# Patient Record
Sex: FEMALE | Race: WHITE | Employment: OTHER | URBAN - METROPOLITAN AREA
[De-identification: names, ages, dates, MRNs, and addresses within clinical notes are randomized per-mention and may not be internally consistent; named-entity substitution may affect disease eponyms.]

---

## 2017-05-06 ENCOUNTER — HOSPITAL ENCOUNTER (EMERGENCY)
Age: 22
Discharge: HOME | End: 2017-05-06
Attending: EMERGENCY MEDICINE
Payer: SELF-PAY

## 2017-05-06 VITALS
RESPIRATION RATE: 16 BRPM | SYSTOLIC BLOOD PRESSURE: 104 MMHG | TEMPERATURE: 98.5 F | HEIGHT: 60 IN | DIASTOLIC BLOOD PRESSURE: 61 MMHG | BODY MASS INDEX: 23.59 KG/M2 | HEART RATE: 99 BPM | OXYGEN SATURATION: 96 % | WEIGHT: 120.15 LBS

## 2017-05-06 DIAGNOSIS — O21.1 HYPEREMESIS GRAVIDARUM BEFORE END OF 22 WEEK GESTATION, DEHYDRATION: Primary | ICD-10-CM

## 2017-05-06 DIAGNOSIS — O09.291 HIGH RISK PREGNANCY DUE TO HISTORY OF PREVIOUS OBSTETRICAL PROBLEM, FIRST TRIMESTER: ICD-10-CM

## 2017-05-06 DIAGNOSIS — K50.919 CROHN'S DISEASE WITH COMPLICATION, UNSPECIFIED GASTROINTESTINAL TRACT LOCATION (HCC): ICD-10-CM

## 2017-05-06 DIAGNOSIS — I95.9 HYPOTENSION, UNSPECIFIED HYPOTENSION TYPE: ICD-10-CM

## 2017-05-06 LAB
ALBUMIN SERPL BCP-MCNC: 3.2 G/DL (ref 3.5–5)
ALBUMIN/GLOB SERPL: 0.7 {RATIO} (ref 1.1–2.2)
ALP SERPL-CCNC: 118 U/L (ref 45–117)
ALT SERPL-CCNC: 37 U/L (ref 12–78)
ANION GAP BLD CALC-SCNC: 11 MMOL/L (ref 5–15)
APPEARANCE UR: ABNORMAL
AST SERPL W P-5'-P-CCNC: 32 U/L (ref 15–37)
BACTERIA URNS QL MICRO: ABNORMAL /HPF
BASOPHILS # BLD AUTO: 0 K/UL
BASOPHILS # BLD: 0 %
BILIRUB SERPL-MCNC: 0.8 MG/DL (ref 0.2–1)
BILIRUB UR QL CFM: NEGATIVE
BUN SERPL-MCNC: 7 MG/DL (ref 6–20)
BUN/CREAT SERPL: 13 (ref 12–20)
CALCIUM SERPL-MCNC: 8.8 MG/DL (ref 8.5–10.1)
CHLORIDE SERPL-SCNC: 104 MMOL/L (ref 97–108)
CO2 SERPL-SCNC: 23 MMOL/L (ref 21–32)
COLOR UR: ABNORMAL
CREAT SERPL-MCNC: 0.53 MG/DL (ref 0.55–1.02)
DIFFERENTIAL METHOD BLD: ABNORMAL
EOSINOPHIL # BLD: 0.7 K/UL
EOSINOPHIL NFR BLD: 5 %
EPITH CASTS URNS QL MICRO: ABNORMAL /LPF
ERYTHROCYTE [DISTWIDTH] IN BLOOD BY AUTOMATED COUNT: 18.6 % (ref 11.5–14.5)
GLOBULIN SER CALC-MCNC: 4.3 G/DL (ref 2–4)
GLUCOSE SERPL-MCNC: 92 MG/DL (ref 65–100)
GLUCOSE UR STRIP.AUTO-MCNC: NEGATIVE MG/DL
HCT VFR BLD AUTO: 31.3 % (ref 35–47)
HGB BLD-MCNC: 9.9 G/DL (ref 11.5–16)
HGB UR QL STRIP: NEGATIVE
KETONES UR QL STRIP.AUTO: 80 MG/DL
LEUKOCYTE ESTERASE UR QL STRIP.AUTO: ABNORMAL
LYMPHOCYTES # BLD AUTO: 5 %
LYMPHOCYTES # BLD: 0.7 K/UL
MCH RBC QN AUTO: 24.2 PG (ref 26–34)
MCHC RBC AUTO-ENTMCNC: 31.6 G/DL (ref 30–36.5)
MCV RBC AUTO: 76.5 FL (ref 80–99)
MONOCYTES # BLD: 0.9 K/UL
MONOCYTES NFR BLD AUTO: 6 %
MUCOUS THREADS URNS QL MICRO: ABNORMAL /LPF
NEUTS BAND NFR BLD MANUAL: 3 %
NEUTS SEG # BLD: 12.6 K/UL
NEUTS SEG NFR BLD AUTO: 81 %
NITRITE UR QL STRIP.AUTO: NEGATIVE
PH UR STRIP: 5.5 [PH] (ref 5–8)
PLATELET # BLD AUTO: 304 K/UL (ref 150–400)
POTASSIUM SERPL-SCNC: 3.1 MMOL/L (ref 3.5–5.1)
PROT SERPL-MCNC: 7.5 G/DL (ref 6.4–8.2)
PROT UR STRIP-MCNC: 100 MG/DL
RBC # BLD AUTO: 4.09 M/UL (ref 3.8–5.2)
RBC #/AREA URNS HPF: ABNORMAL /HPF (ref 0–5)
RBC MORPH BLD: ABNORMAL
SODIUM SERPL-SCNC: 138 MMOL/L (ref 136–145)
SP GR UR REFRACTOMETRY: >1.03 (ref 1–1.03)
UA: UC IF INDICATED,UAUC: ABNORMAL
UROBILINOGEN UR QL STRIP.AUTO: 1 EU/DL (ref 0.2–1)
WBC # BLD AUTO: 14.9 K/UL (ref 3.6–11)
WBC URNS QL MICRO: ABNORMAL /HPF (ref 0–4)

## 2017-05-06 PROCEDURE — 87086 URINE CULTURE/COLONY COUNT: CPT | Performed by: PHYSICIAN ASSISTANT

## 2017-05-06 PROCEDURE — 81001 URINALYSIS AUTO W/SCOPE: CPT | Performed by: PHYSICIAN ASSISTANT

## 2017-05-06 PROCEDURE — 96374 THER/PROPH/DIAG INJ IV PUSH: CPT

## 2017-05-06 PROCEDURE — 74011250636 HC RX REV CODE- 250/636: Performed by: PHYSICIAN ASSISTANT

## 2017-05-06 PROCEDURE — 74011250637 HC RX REV CODE- 250/637: Performed by: PHYSICIAN ASSISTANT

## 2017-05-06 PROCEDURE — 85025 COMPLETE CBC W/AUTO DIFF WBC: CPT | Performed by: PHYSICIAN ASSISTANT

## 2017-05-06 PROCEDURE — 80053 COMPREHEN METABOLIC PANEL: CPT | Performed by: PHYSICIAN ASSISTANT

## 2017-05-06 PROCEDURE — 36415 COLL VENOUS BLD VENIPUNCTURE: CPT | Performed by: PHYSICIAN ASSISTANT

## 2017-05-06 PROCEDURE — 99283 EMERGENCY DEPT VISIT LOW MDM: CPT

## 2017-05-06 PROCEDURE — 96361 HYDRATE IV INFUSION ADD-ON: CPT

## 2017-05-06 PROCEDURE — 96376 TX/PRO/DX INJ SAME DRUG ADON: CPT

## 2017-05-06 RX ORDER — ONDANSETRON 2 MG/ML
4 INJECTION INTRAMUSCULAR; INTRAVENOUS
Status: COMPLETED | OUTPATIENT
Start: 2017-05-06 | End: 2017-05-06

## 2017-05-06 RX ORDER — POTASSIUM CHLORIDE 20 MEQ/1
40 TABLET, EXTENDED RELEASE ORAL
Status: COMPLETED | OUTPATIENT
Start: 2017-05-06 | End: 2017-05-06

## 2017-05-06 RX ORDER — PROMETHAZINE HYDROCHLORIDE 25 MG/1
25 TABLET ORAL
Qty: 12 TAB | Refills: 0 | Status: SHIPPED | OUTPATIENT
Start: 2017-05-06 | End: 2017-07-11

## 2017-05-06 RX ORDER — SODIUM CHLORIDE 0.9 % (FLUSH) 0.9 %
5-10 SYRINGE (ML) INJECTION AS NEEDED
Status: DISCONTINUED | OUTPATIENT
Start: 2017-05-06 | End: 2017-05-06 | Stop reason: HOSPADM

## 2017-05-06 RX ORDER — ONDANSETRON 4 MG/1
4 TABLET, ORALLY DISINTEGRATING ORAL
Qty: 10 TAB | Refills: 0 | Status: SHIPPED | OUTPATIENT
Start: 2017-05-06 | End: 2017-07-11

## 2017-05-06 RX ORDER — SODIUM CHLORIDE 0.9 % (FLUSH) 0.9 %
5-10 SYRINGE (ML) INJECTION EVERY 8 HOURS
Status: DISCONTINUED | OUTPATIENT
Start: 2017-05-06 | End: 2017-05-06 | Stop reason: HOSPADM

## 2017-05-06 RX ORDER — ACETAMINOPHEN 500 MG
1000 TABLET ORAL ONCE
Status: COMPLETED | OUTPATIENT
Start: 2017-05-06 | End: 2017-05-06

## 2017-05-06 RX ADMIN — SODIUM CHLORIDE 1000 ML: 900 INJECTION, SOLUTION INTRAVENOUS at 10:13

## 2017-05-06 RX ADMIN — POTASSIUM CHLORIDE 40 MEQ: 20 TABLET, EXTENDED RELEASE ORAL at 12:22

## 2017-05-06 RX ADMIN — Medication 10 ML: at 11:09

## 2017-05-06 RX ADMIN — ONDANSETRON HYDROCHLORIDE 4 MG: 2 INJECTION, SOLUTION INTRAMUSCULAR; INTRAVENOUS at 11:09

## 2017-05-06 RX ADMIN — ACETAMINOPHEN 1000 MG: 500 TABLET ORAL at 11:09

## 2017-05-06 RX ADMIN — ONDANSETRON HYDROCHLORIDE 4 MG: 2 INJECTION, SOLUTION INTRAMUSCULAR; INTRAVENOUS at 10:09

## 2017-05-06 NOTE — DISCHARGE INSTRUCTIONS
Extreme Nausea and Vomiting in Pregnancy: Care Instructions  Your Care Instructions  Nausea and vomiting (often called morning sickness) are common in pregnancy. They are caused by pregnancy hormones and happen most often in the first 3 months. Some women get very sick and are not able to keep down food and fluids. This extreme morning sickness is called hyperemesis gravidarum. It can lead to a dangerous loss of fluids in the body. It also can keep you from gaining weight and getting proper nutrition during your pregnancy. Your body fluids are put back in balance with water and minerals called electrolytes. Medicine may help if you have severe nausea and vomiting. Follow-up care is a key part of your treatment and safety. Be sure to make and go to all appointments, and call your doctor if you are having problems. It's also a good idea to know your test results and keep a list of the medicines you take. How can you care for yourself at home? · Take your medicines exactly as prescribed. Call your doctor if you think you are having a problem with your medicine. · Drink plenty of fluids to prevent dehydration. Choose water and other caffeine-free clear liquids until you feel better. Try sipping on sports drinks that have salt and sugar in them. · Eat a small snack, such as crackers, before you get out of bed. Wait a few minutes, then get out of bed slowly. · Keep food in your stomach, but not too much at once. An empty stomach can make nausea worse. Eat several small meals every day instead of three large meals. · Eat more protein and less fat. · Get plenty of vitamin B6 by eating whole grains, nuts, seeds, and legumes. You can take vitamin B6 tablets if your doctor says it is okay. · Try to avoid smells and foods that make you feel sick to your stomach. · Get lots of rest.  · You may want to try acupressure bands.  They put pressure on an acupressure point in the wrist. Some women feel better using the bands.  · Lucero may also help you feel better. You can use it in tea, take it as a pill, or use a lucero syrup that you can buy at a health food store. When should you call for help? Call 911 anytime you think you may need emergency care. For example, call if:  · You passed out (lost consciousness). Call your doctor now or seek immediate medical care if:  · You vomit more than 3 times in a day, especially if you also have a fever or pain. · You are too sick to your stomach to drink any fluids. · You have signs of needing more fluids. You have sunken eyes and a dry mouth, and you pass only a little dark urine. · Your morning sickness gets worse or does not get better with home care. · You are not able to keep down your medicine. Watch closely for changes in your health, and be sure to contact your doctor if you have any problems. Where can you learn more? Go to http://giselle-tara.info/. Enter D697 in the search box to learn more about \"Extreme Nausea and Vomiting in Pregnancy: Care Instructions. \"  Current as of: May 30, 2016  Content Version: 11.2  © 3341-5368 [employer]. Care instructions adapted under license by [employer] (which disclaims liability or warranty for this information). If you have questions about a medical condition or this instruction, always ask your healthcare professional. [place] [address] any warranty or liability for your use of this information.

## 2017-05-06 NOTE — ED NOTES
Canelo Reynoso reviewed discharge instructions with the patient and spouse. The patient and spouse verbalized understanding.

## 2017-05-06 NOTE — ED PROVIDER NOTES
HPI Comments: Mikey Mata is a 24 y.o. female with PMhx significant for crohn's disease who presents ambulatory to the ED with cc of constant nausea and vomiting x 3 days. She also reports abdominal pain and cramping secondary to vomiting. Pt states she is seven weeks pregnant and has not been able to tolerate any PO since the onset of current symptoms. She notes this is her second pregnancy and reports complication of placental abruption at 31 weeks with her first pregnancy. She also reports constant morning sickness with her first pregnancy, but states phenergan worked well. Pt states she was instructed to stop taking her prenatal vitamins for one week as they were aggravating her crohn's disease, but will be taking [employer] vitamin gummies once her crohn's is controlled. She denies taking any medication at home for her current symptoms. Pt specifically denies any vaginal discharge and vaginal bleeding. PCP: No primary care provider on file. OB/GYN: Diane Campos. Kate Berman MD; Joe Espinosa MD    There are no other complaints, changes or physical findings at this time. The history is provided by the patient. No past medical history on file. No past surgical history on file. No family history on file. Social History     Social History    Marital status: SINGLE     Spouse name: N/A    Number of children: N/A    Years of education: N/A     Occupational History    Not on file. Social History Main Topics    Smoking status: Not on file    Smokeless tobacco: Not on file    Alcohol use Not on file    Drug use: Not on file    Sexual activity: Not on file     Other Topics Concern    Not on file     Social History Narrative    No narrative on file         ALLERGIES: Codeine    Review of Systems   Constitutional: Negative for chills and fever. HENT: Negative for congestion, rhinorrhea and sore throat. Respiratory: Negative for cough and shortness of breath. Cardiovascular: Negative for chest pain and palpitations. Gastrointestinal: Positive for abdominal pain, nausea and vomiting. Negative for diarrhea. Genitourinary: Negative for dysuria, hematuria, vaginal bleeding and vaginal discharge. Musculoskeletal: Negative for neck pain and neck stiffness. Skin: Negative for rash and wound. Neurological: Negative for dizziness and headaches. Psychiatric/Behavioral: Negative for agitation and confusion. Patient Vitals for the past 12 hrs:   Temp Pulse Resp BP SpO2   05/06/17 1221 - - - 104/61 96 %   05/06/17 1218 - - - 95/52 100 %   05/06/17 1216 - 99 - 103/54 100 %   05/06/17 1216 - - - 103/54 100 %   05/06/17 0938 - - - 112/71 -   05/06/17 0936 98.5 °F (36.9 °C) (!) 113 16 (!) 82/56 100 %            Physical Exam   Constitutional: She is oriented to person, place, and time. She appears well-developed and well-nourished. No distress. HENT:   Head: Normocephalic and atraumatic. Nose: Nose normal.   Mouth/Throat: No oropharyngeal exudate. Dry oral mucosa   Eyes: Conjunctivae and EOM are normal. Right eye exhibits no discharge. Left eye exhibits no discharge. No scleral icterus. Neck: Normal range of motion. Neck supple. No JVD present. No tracheal deviation present. No thyromegaly present. Cardiovascular: Normal rate, regular rhythm and normal heart sounds. Pulmonary/Chest: Effort normal and breath sounds normal. No respiratory distress. She has no wheezes. Abdominal: Soft. There is no tenderness. Musculoskeletal: Normal range of motion. She exhibits no edema. Lymphadenopathy:     She has no cervical adenopathy. Neurological: She is alert and oriented to person, place, and time. She exhibits normal muscle tone. Coordination normal.   Skin: Skin is warm and dry. She is not diaphoretic. Psychiatric: She has a normal mood and affect. Her behavior is normal. Judgment normal.   Nursing note and vitals reviewed.        MDM  Number of Diagnoses or Management Options  Diagnosis management comments: DDx: hyperemesis gravidarum, UTI, dehydration       Amount and/or Complexity of Data Reviewed  Clinical lab tests: ordered and reviewed  Review and summarize past medical records: yes    Patient Progress  Patient progress: stable    ED Course       Procedures    PROGRESS NOTE:  10:59 AM  Pt reports she is feeling a little better, but reports a headache. Written by Chantal Murillo, ED Scribe, as dictated by Bakari Townsend. PROGRESS NOTE:  11:38 AM  Pt states her blood pressure is low for her and she still feels dizzy with ambulation. Will order another 500 IV bolus. Written by Chantal Murillo, ED Scribe, as dictated by Bakari Townsend. PROGRESS NOTE:  12:23 PM  Pt reports her dizziness has improved and she desires to go home. Written by Chantal uMrillo, ED Scribe, as dictated by Bakari Townsend.     LABORATORY TESTS:  Recent Results (from the past 12 hour(s))   URINALYSIS W/ REFLEX CULTURE    Collection Time: 05/06/17 10:01 AM   Result Value Ref Range    Color DARK YELLOW      Appearance CLOUDY (A) CLEAR      Specific gravity >1.030 (H) 1.003 - 1.030    pH (UA) 5.5 5.0 - 8.0      Protein 100 (A) NEG mg/dL    Glucose NEGATIVE  NEG mg/dL    Ketone 80 (A) NEG mg/dL    Blood NEGATIVE  NEG      Urobilinogen 1.0 0.2 - 1.0 EU/dL    Nitrites NEGATIVE  NEG      Leukocyte Esterase TRACE (A) NEG      WBC 10-20 0 - 4 /hpf    RBC 0-5 0 - 5 /hpf    Epithelial cells MANY (A) FEW /lpf    Bacteria 1+ (A) NEG /hpf    UA:UC IF INDICATED URINE CULTURE ORDERED (A) CNI      Mucus 2+ (A) NEG /lpf   CBC WITH AUTOMATED DIFF    Collection Time: 05/06/17 10:01 AM   Result Value Ref Range    WBC 14.9 (H) 3.6 - 11.0 K/uL    RBC 4.09 3.80 - 5.20 M/uL    HGB 9.9 (L) 11.5 - 16.0 g/dL    HCT 31.3 (L) 35.0 - 47.0 %    MCV 76.5 (L) 80.0 - 99.0 FL    MCH 24.2 (L) 26.0 - 34.0 PG    MCHC 31.6 30.0 - 36.5 g/dL    RDW 18.6 (H) 11.5 - 14.5 %    PLATELET 666 788 - 357 K/uL    NEUTROPHILS 81 %    BAND NEUTROPHILS 3 %    LYMPHOCYTES 5 %    MONOCYTES 6 %    EOSINOPHILS 5 %    BASOPHILS 0 %    ABS. NEUTROPHILS 12.6 K/UL    ABS. LYMPHOCYTES 0.7 K/UL    ABS. MONOCYTES 0.9 K/UL    ABS. EOSINOPHILS 0.7 K/UL    ABS. BASOPHILS 0.0 K/UL    RBC COMMENTS ANISOCYTOSIS  2+        DF MANUAL     METABOLIC PANEL, COMPREHENSIVE    Collection Time: 05/06/17 10:01 AM   Result Value Ref Range    Sodium 138 136 - 145 mmol/L    Potassium 3.1 (L) 3.5 - 5.1 mmol/L    Chloride 104 97 - 108 mmol/L    CO2 23 21 - 32 mmol/L    Anion gap 11 5 - 15 mmol/L    Glucose 92 65 - 100 mg/dL    BUN 7 6 - 20 MG/DL    Creatinine 0.53 (L) 0.55 - 1.02 MG/DL    BUN/Creatinine ratio 13 12 - 20      GFR est AA >60 >60 ml/min/1.73m2    GFR est non-AA >60 >60 ml/min/1.73m2    Calcium 8.8 8.5 - 10.1 MG/DL    Bilirubin, total 0.8 0.2 - 1.0 MG/DL    ALT (SGPT) 37 12 - 78 U/L    AST (SGOT) 32 15 - 37 U/L    Alk. phosphatase 118 (H) 45 - 117 U/L    Protein, total 7.5 6.4 - 8.2 g/dL    Albumin 3.2 (L) 3.5 - 5.0 g/dL    Globulin 4.3 (H) 2.0 - 4.0 g/dL    A-G Ratio 0.7 (L) 1.1 - 2.2     BILIRUBIN, CONFIRM    Collection Time: 05/06/17 10:01 AM   Result Value Ref Range    Bilirubin UA, confirm NEGATIVE  NEG         MEDICATIONS GIVEN:  Medications   sodium chloride (NS) flush 5-10 mL (10 mL IntraVENous Given 5/6/17 1109)   sodium chloride (NS) flush 5-10 mL (not administered)   sodium chloride 0.9 % bolus infusion 1,000 mL (0 mL IntraVENous IV Completed 5/6/17 1218)   ondansetron (ZOFRAN) injection 4 mg (4 mg IntraVENous Given 5/6/17 1009)   ondansetron (ZOFRAN) injection 4 mg (4 mg IntraVENous Given 5/6/17 1109)   acetaminophen (TYLENOL) tablet 1,000 mg (1,000 mg Oral Given 5/6/17 1109)   potassium chloride (K-DUR, KLOR-CON) SR tablet 40 mEq (40 mEq Oral Given 5/6/17 1222)       IMPRESSION:  1. Hyperemesis gravidarum before end of 22 week gestation, dehydration    2. High risk pregnancy due to history of previous obstetrical problem, first trimester    3.  Crohn's disease with complication, unspecified gastrointestinal tract location [facility])        PLAN:  1. Discharge home  Current Discharge Medication List      START taking these medications    Details   ondansetron (ZOFRAN ODT) 4 mg disintegrating tablet Take 1 Tab by mouth every eight (8) hours as needed for Nausea for up to 10 doses. Qty: 10 Tab, Refills: 0      promethazine (PHENERGAN) 25 mg tablet Take 1 Tab by mouth every six (6) hours as needed for Nausea for up to 12 doses. Qty: 12 Tab, Refills: 0           2. Follow-up Information     Follow up With Details Comments [address] Pickering MD   [address] [facility]  [phone]      \[facility]\"" EMERGENCY DEPT  If symptoms worsen [address]  [address]  [phone]        Return to ED if worse     DISCHARGE NOTE:  12:35 PM  The patient is ready for discharge. The patients signs, symptoms, diagnosis, and instructions for discharge have been discussed and the pt has conveyed their understanding. The patient is to follow up as recommended with PCP or return to the ER should their symptoms worsen. Plan has been discussed and patient has conveyed their agreement. This note is prepared by Amilcar Monahan, acting as Scribe for [facility]. JAMEEL López: The scribe's documentation has been prepared under my direction and personally reviewed by me in its entirety. I confirm that the note above accurately reflects all work, treatment, procedures, and medical decision making performed by me.

## 2017-05-06 NOTE — Clinical Note
Rest, liquid diet until symptoms improve, then gradually advance as tolerated. Follow up with Ob/Gyn for recheck. Return to the Emergency Dept for any concerns. Thank you for allowing us to provide you with medical care today. We realize that you h ave many choices for your emergency care needs. We thank you for choosing [facility]. Please choose us in the future for any continued health care needs. We hope we addressed all of your medical concerns. We strive to provide  excellent quality care in the Emergency Department. Anything less than excellent care does not meet our expectations. If a prescription has been provided, please have it filled as soon as possible to avoid a delay in treatment. Read the entire medi cation instruction sheet provided to you by the pharmacy. If you have any questions or reservations about taking the medication due to side effects or interactions with other medications please call the ER or your primary care physician. The exam an d treatment you received in the Emergency Department were for an emergent problem and is not intended as complete care. It is important that you follow up with a doctor, nurse practitioner, or  [id number] assistant for ongoing care. If your symptoms wors en or you do not improve as expected and you are unable to reach your usual health care provider, you should return to the Emergency Department. We are available 24 hours a day. You may be contacted by a [address] for your opinion of this visit. We would appreciate it if you answer \"very satisfied\" to the survey questions. If you are unable to answer that you are \"very satisfied\" with your visit please contact our nurse manager at [phone] to discuss your concerns. We strive to do the best w e can and your opinions are important to us and anything less that \"very satisfied\" is not acceptable to [facility] or [facility].

## 2017-05-06 NOTE — LETTER
[address] 
[facility] EMERGENCY DEPT 
[address] [place] [address] [address] [id number] [phone] Work/School Note Date: 5/6/2017 To Whom It May concern: 
 
Philippe Florence was seen and treated today in the emergency room. She may return to work in 1 to 2 days, as symptoms improve. Sincerely, Roman Butler, [address]

## 2017-05-06 NOTE — ED NOTES
Bedside report given by ROM Paul using [employer]. Patient still experiencing some nausea. Zofran and tylenol given for a headache also.

## 2017-05-07 ENCOUNTER — APPOINTMENT (OUTPATIENT)
Dept: ULTRASOUND IMAGING | Age: 22
End: 2017-05-07
Attending: EMERGENCY MEDICINE
Payer: SELF-PAY

## 2017-05-07 ENCOUNTER — HOSPITAL ENCOUNTER (EMERGENCY)
Age: 22
Discharge: HOME | End: 2017-05-07
Attending: EMERGENCY MEDICINE
Payer: SELF-PAY

## 2017-05-07 VITALS
TEMPERATURE: 99.5 F | WEIGHT: 122.14 LBS | BODY MASS INDEX: 23.98 KG/M2 | RESPIRATION RATE: 16 BRPM | SYSTOLIC BLOOD PRESSURE: 101 MMHG | HEIGHT: 60 IN | HEART RATE: 103 BPM | DIASTOLIC BLOOD PRESSURE: 56 MMHG | OXYGEN SATURATION: 96 %

## 2017-05-07 DIAGNOSIS — R10.9 ABDOMINAL PAIN IN PREGNANCY, FIRST TRIMESTER: Primary | ICD-10-CM

## 2017-05-07 DIAGNOSIS — O26.891 ABDOMINAL PAIN IN PREGNANCY, FIRST TRIMESTER: Primary | ICD-10-CM

## 2017-05-07 LAB
ALBUMIN SERPL BCP-MCNC: 2.7 G/DL (ref 3.5–5)
ALBUMIN/GLOB SERPL: 0.7 {RATIO} (ref 1.1–2.2)
ALP SERPL-CCNC: 112 U/L (ref 45–117)
ALT SERPL-CCNC: 52 U/L (ref 12–78)
ANION GAP BLD CALC-SCNC: 8 MMOL/L (ref 5–15)
APPEARANCE UR: ABNORMAL
AST SERPL W P-5'-P-CCNC: 20 U/L (ref 15–37)
BACTERIA SPEC CULT: NORMAL
BACTERIA URNS QL MICRO: NEGATIVE /HPF
BASOPHILS # BLD AUTO: 0 K/UL (ref 0–0.1)
BASOPHILS # BLD: 0 % (ref 0–1)
BILIRUB SERPL-MCNC: 0.4 MG/DL (ref 0.2–1)
BILIRUB UR QL: NEGATIVE
BUN SERPL-MCNC: 5 MG/DL (ref 6–20)
BUN/CREAT SERPL: 10 (ref 12–20)
CALCIUM SERPL-MCNC: 8 MG/DL (ref 8.5–10.1)
CC UR VC: NORMAL
CHLORIDE SERPL-SCNC: 104 MMOL/L (ref 97–108)
CO2 SERPL-SCNC: 25 MMOL/L (ref 21–32)
COLOR UR: ABNORMAL
CREAT SERPL-MCNC: 0.5 MG/DL (ref 0.55–1.02)
DIFFERENTIAL METHOD BLD: ABNORMAL
EOSINOPHIL # BLD: 0.2 K/UL (ref 0–0.4)
EOSINOPHIL NFR BLD: 1 % (ref 0–7)
EPITH CASTS URNS QL MICRO: ABNORMAL /LPF
ERYTHROCYTE [DISTWIDTH] IN BLOOD BY AUTOMATED COUNT: 19 % (ref 11.5–14.5)
GLOBULIN SER CALC-MCNC: 3.8 G/DL (ref 2–4)
GLUCOSE SERPL-MCNC: 94 MG/DL (ref 65–100)
GLUCOSE UR STRIP.AUTO-MCNC: NEGATIVE MG/DL
HCT VFR BLD AUTO: 28.2 % (ref 35–47)
HGB BLD-MCNC: 9.1 G/DL (ref 11.5–16)
HGB UR QL STRIP: NEGATIVE
HYALINE CASTS URNS QL MICRO: ABNORMAL /LPF (ref 0–5)
KETONES UR QL STRIP.AUTO: ABNORMAL MG/DL
LEUKOCYTE ESTERASE UR QL STRIP.AUTO: NEGATIVE
LYMPHOCYTES # BLD AUTO: 8 % (ref 12–49)
LYMPHOCYTES # BLD: 1.2 K/UL (ref 0.8–3.5)
MCH RBC QN AUTO: 24.9 PG (ref 26–34)
MCHC RBC AUTO-ENTMCNC: 32.3 G/DL (ref 30–36.5)
MCV RBC AUTO: 77 FL (ref 80–99)
MONOCYTES # BLD: 2 K/UL (ref 0–1)
MONOCYTES NFR BLD AUTO: 13 % (ref 5–13)
NEUTS SEG # BLD: 11.6 K/UL (ref 1.8–8)
NEUTS SEG NFR BLD AUTO: 78 % (ref 32–75)
NITRITE UR QL STRIP.AUTO: NEGATIVE
PH UR STRIP: 6 [PH] (ref 5–8)
PLATELET # BLD AUTO: 305 K/UL (ref 150–400)
POTASSIUM SERPL-SCNC: 3.3 MMOL/L (ref 3.5–5.1)
PROT SERPL-MCNC: 6.5 G/DL (ref 6.4–8.2)
PROT UR STRIP-MCNC: NEGATIVE MG/DL
RBC # BLD AUTO: 3.66 M/UL (ref 3.8–5.2)
RBC #/AREA URNS HPF: ABNORMAL /HPF (ref 0–5)
RBC MORPH BLD: ABNORMAL
SERVICE CMNT-IMP: NORMAL
SODIUM SERPL-SCNC: 137 MMOL/L (ref 136–145)
SP GR UR REFRACTOMETRY: 1.01 (ref 1–1.03)
UA: UC IF INDICATED,UAUC: ABNORMAL
UROBILINOGEN UR QL STRIP.AUTO: 0.2 EU/DL (ref 0.2–1)
WBC # BLD AUTO: 15 K/UL (ref 3.6–11)
WBC URNS QL MICRO: ABNORMAL /HPF (ref 0–4)

## 2017-05-07 PROCEDURE — 36415 COLL VENOUS BLD VENIPUNCTURE: CPT | Performed by: EMERGENCY MEDICINE

## 2017-05-07 PROCEDURE — 76801 OB US < 14 WKS SINGLE FETUS: CPT

## 2017-05-07 PROCEDURE — 74011250636 HC RX REV CODE- 250/636: Performed by: EMERGENCY MEDICINE

## 2017-05-07 PROCEDURE — 96374 THER/PROPH/DIAG INJ IV PUSH: CPT

## 2017-05-07 PROCEDURE — 81001 URINALYSIS AUTO W/SCOPE: CPT | Performed by: EMERGENCY MEDICINE

## 2017-05-07 PROCEDURE — 96361 HYDRATE IV INFUSION ADD-ON: CPT

## 2017-05-07 PROCEDURE — 74011250637 HC RX REV CODE- 250/637: Performed by: EMERGENCY MEDICINE

## 2017-05-07 PROCEDURE — 99284 EMERGENCY DEPT VISIT MOD MDM: CPT

## 2017-05-07 PROCEDURE — 96375 TX/PRO/DX INJ NEW DRUG ADDON: CPT

## 2017-05-07 PROCEDURE — 85025 COMPLETE CBC W/AUTO DIFF WBC: CPT | Performed by: EMERGENCY MEDICINE

## 2017-05-07 PROCEDURE — 80053 COMPREHEN METABOLIC PANEL: CPT | Performed by: EMERGENCY MEDICINE

## 2017-05-07 RX ORDER — HYDROCODONE BITARTRATE AND ACETAMINOPHEN 5; 325 MG/1; MG/1
1 TABLET ORAL
Status: DISCONTINUED | OUTPATIENT
Start: 2017-05-07 | End: 2017-05-07

## 2017-05-07 RX ORDER — MORPHINE SULFATE 2 MG/ML
2 INJECTION, SOLUTION INTRAMUSCULAR; INTRAVENOUS
Status: COMPLETED | OUTPATIENT
Start: 2017-05-07 | End: 2017-05-07

## 2017-05-07 RX ORDER — OXYCODONE AND ACETAMINOPHEN 5; 325 MG/1; MG/1
1 TABLET ORAL
Status: COMPLETED | OUTPATIENT
Start: 2017-05-07 | End: 2017-05-07

## 2017-05-07 RX ORDER — HYDROCODONE BITARTRATE AND ACETAMINOPHEN 5; 325 MG/1; MG/1
1 TABLET ORAL
Qty: 12 TAB | Refills: 0 | Status: SHIPPED | OUTPATIENT
Start: 2017-05-07 | End: 2017-07-11

## 2017-05-07 RX ORDER — OXYCODONE AND ACETAMINOPHEN 5; 325 MG/1; MG/1
1 TABLET ORAL
Qty: 12 TAB | Refills: 0 | Status: SHIPPED | OUTPATIENT
Start: 2017-05-07 | End: 2017-07-11

## 2017-05-07 RX ORDER — ONDANSETRON 2 MG/ML
4 INJECTION INTRAMUSCULAR; INTRAVENOUS
Status: COMPLETED | OUTPATIENT
Start: 2017-05-07 | End: 2017-05-07

## 2017-05-07 RX ADMIN — OXYCODONE HYDROCHLORIDE AND ACETAMINOPHEN 1 TABLET: 5; 325 TABLET ORAL at 20:02

## 2017-05-07 RX ADMIN — SODIUM CHLORIDE 1000 ML: 900 INJECTION, SOLUTION INTRAVENOUS at 16:06

## 2017-05-07 RX ADMIN — Medication 2 MG: at 16:07

## 2017-05-07 RX ADMIN — ONDANSETRON HYDROCHLORIDE 4 MG: 2 INJECTION, SOLUTION INTRAMUSCULAR; INTRAVENOUS at 15:56

## 2017-05-07 NOTE — ED NOTES
Bedside report received from ROM Santiago. Patient complaining of significant pain and nausea, MD aware. Patient on monitor x2, resting in stretcher, call bell within reach.

## 2017-05-07 NOTE — ED PROVIDER NOTES
HPI Comments: Vandana Marx is a 24 y.o. female who is 8 weeks pregnant with pertinent PMHx of Crohn's Disease who presents ambulatory with family member to ED c/o nausea and vomiting for the past 4 days, along with associated moderate cramping LLQ abdominal pain. She also notes dizziness. Pt states she came to the [facility] ED yesterday and was prescribed Zofran and Phenergan, which have not improved her symptoms. She notes this is her second pregnancy and reports complication of placental abruption at 31 weeks with her first pregnancy. She also reports constant morning sickness with her first pregnancy, but states Phenergan had worked well previously. She has noticed a small amount of streaking blood in her vomit. Pt denies vaginal discharge, vaginal bleeding    OB/GYN: Agustina Mcmahon MD; Huma Walter MD    Social Hx:  tobacco (-), EtOH (-)     There are no other complaints, changes or physical findings at this time. The history is provided by the patient. No  was used. Past Medical History:   Diagnosis Date    Crohn's colitis ([facility] [address]        No past surgical history on file. No family history on file. Social History     Social History    Marital status: SINGLE     Spouse name: N/A    Number of children: N/A    Years of education: N/A     Occupational History    Not on file. Social History Main Topics    Smoking status: Not on file    Smokeless tobacco: Not on file    Alcohol use Not on file    Drug use: Not on file    Sexual activity: Not on file     Other Topics Concern    Not on file     Social History Narrative         ALLERGIES: Codeine    Review of Systems   Constitutional: Negative for fatigue and fever. HENT: Negative. Eyes: Negative. Negative for visual disturbance. Respiratory: Negative for shortness of breath and wheezing. Cardiovascular: Negative for chest pain and leg swelling.    Gastrointestinal: Positive for abdominal pain, nausea and vomiting. Negative for blood in stool, constipation and diarrhea. Endocrine: Negative. Genitourinary: Negative for difficulty urinating, dysuria, vaginal bleeding and vaginal discharge. Musculoskeletal: Negative. Skin: Negative for rash. Allergic/Immunologic: Negative. Neurological: Positive for dizziness. Negative for weakness and numbness. Hematological: Negative. Psychiatric/Behavioral: Negative. Patient Vitals for the past 12 hrs:   Temp Pulse Resp BP SpO2   05/07/17 1630 - - - 101/57 96 %   05/07/17 1615 - - - 105/54 96 %   05/07/17 1600 - - - 110/58 96 %   05/07/17 1348 99.5 °F (37.5 °C) (!) 103 16 102/68 100 %       Physical Exam   Constitutional: She is oriented to person, place, and time. She appears well-developed and well-nourished. No distress. HENT:   Head: Normocephalic and atraumatic. Mouth/Throat: Oropharynx is clear and moist.   Eyes: Conjunctivae and EOM are normal.   Neck: Neck supple. No JVD present. No tracheal deviation present. Cardiovascular: Regular rhythm and intact distal pulses. Tachycardia present. Exam reveals no gallop and no friction rub. No murmur heard. Pulmonary/Chest: Effort normal and breath sounds normal. No stridor. No respiratory distress. She has no wheezes. Abdominal: Soft. Bowel sounds are normal. She exhibits no distension and no mass. There is tenderness in the left lower quadrant. There is no rebound and no guarding. Musculoskeletal: Normal range of motion. She exhibits no edema or tenderness. No deformity   Neurological: She is alert and oriented to person, place, and time. She has normal strength. No focal deficits   Skin: Skin is warm, dry and intact. No rash noted. Psychiatric: She has a normal mood and affect. Her behavior is normal. Judgment and thought content normal.   Nursing note and vitals reviewed.        MDM  Number of Diagnoses or Management Options  Abdominal pain in pregnancy, first trimester: Diagnosis management comments: Pt is 8 weeks pregnant, has had no vaginal bleeding or discharge, but she had a high risk first pregnancy. Presenting with persistent n/v. DDx includes electrolyte abnormality, dehydration, uti, pyelonephritis. Pt was seen yesterday but no US was done. Will get US done here today to eval for ovarian cyst, ovarian torsion, ectopic pregnancy. Will treat with ivf, antiemetics, then reassess. Amount and/or Complexity of Data Reviewed  Clinical lab tests: reviewed and ordered  Tests in the radiology section of CPT®: reviewed and ordered  Review and summarize past medical records: yes  Discuss the patient with other providers: yes (Hospitalist)    Patient Progress  Patient progress: stable    ED Course       Procedures    Progress Note  4:33 PM    Kwabena Jacinto DO has re-evaluated pt, and pt states she is feeling better. She has not gone for US yet. LABORATORY TESTS:  Recent Results (from the past 12 hour(s))   METABOLIC PANEL, COMPREHENSIVE    Collection Time: 05/07/17  3:50 PM   Result Value Ref Range    Sodium 137 136 - 145 mmol/L    Potassium 3.3 (L) 3.5 - 5.1 mmol/L    Chloride 104 97 - 108 mmol/L    CO2 25 21 - 32 mmol/L    Anion gap 8 5 - 15 mmol/L    Glucose 94 65 - 100 mg/dL    BUN 5 (L) 6 - 20 MG/DL    Creatinine 0.50 (L) 0.55 - 1.02 MG/DL    BUN/Creatinine ratio 10 (L) 12 - 20      GFR est AA >60 >60 ml/min/1.73m2    GFR est non-AA >60 >60 ml/min/1.73m2    Calcium 8.0 (L) 8.5 - 10.1 MG/DL    Bilirubin, total 0.4 0.2 - 1.0 MG/DL    ALT (SGPT) 52 12 - 78 U/L    AST (SGOT) 20 15 - 37 U/L    Alk.  phosphatase 112 45 - 117 U/L    Protein, total 6.5 6.4 - 8.2 g/dL    Albumin 2.7 (L) 3.5 - 5.0 g/dL    Globulin 3.8 2.0 - 4.0 g/dL    A-G Ratio 0.7 (L) 1.1 - 2.2     CBC WITH AUTOMATED DIFF    Collection Time: 05/07/17  3:50 PM   Result Value Ref Range    WBC 15.0 (H) 3.6 - 11.0 K/uL    RBC 3.66 (L) 3.80 - 5.20 M/uL    HGB 9.1 (L) 11.5 - 16.0 g/dL    HCT 28.2 (L) 35.0 - 47.0 %    MCV 77.0 (L) 80.0 - 99.0 FL    MCH 24.9 (L) 26.0 - 34.0 PG    MCHC 32.3 30.0 - 36.5 g/dL    RDW 19.0 (H) 11.5 - 14.5 %    PLATELET 042 913 - 148 K/uL    NEUTROPHILS 78 (H) 32 - 75 %    LYMPHOCYTES 8 (L) 12 - 49 %    MONOCYTES 13 5 - 13 %    EOSINOPHILS 1 0 - 7 %    BASOPHILS 0 0 - 1 %    ABS. NEUTROPHILS 11.6 (H) 1.8 - 8.0 K/UL    ABS. LYMPHOCYTES 1.2 0.8 - 3.5 K/UL    ABS. MONOCYTES 2.0 (H) 0.0 - 1.0 K/UL    ABS. EOSINOPHILS 0.2 0.0 - 0.4 K/UL    ABS. BASOPHILS 0.0 0.0 - 0.1 K/UL    DF SMEAR SCANNED      RBC COMMENTS ANISOCYTOSIS  1+        RBC COMMENTS HYPOCHROMIA  1+        RBC COMMENTS MICROCYTOSIS  1+       URINALYSIS W/ REFLEX CULTURE    Collection Time: 05/07/17  5:49 PM   Result Value Ref Range    Color YELLOW/STRAW      Appearance CLOUDY (A) CLEAR      Specific gravity 1.008 1.003 - 1.030      pH (UA) 6.0 5.0 - 8.0      Protein NEGATIVE  NEG mg/dL    Glucose NEGATIVE  NEG mg/dL    Ketone TRACE (A) NEG mg/dL    Bilirubin NEGATIVE  NEG      Blood NEGATIVE  NEG      Urobilinogen 0.2 0.2 - 1.0 EU/dL    Nitrites NEGATIVE  NEG      Leukocyte Esterase NEGATIVE  NEG      WBC 0-4 0 - 4 /hpf    RBC 0-5 0 - 5 /hpf    Epithelial cells FEW FEW /lpf    Bacteria NEGATIVE  NEG /hpf    UA:UC IF INDICATED CULTURE NOT INDICATED BY UA RESULT CNI      Hyaline cast 2-5 0 - 5 /lpf       IMAGING RESULTS:      Study Result      History: Left lower quadrant pain, 8 weeks pregnant.     Transabdominal ultrasound of the pelvis was performed. A transvaginal scan was  not performed as the patient declined this examination.     The uterus measures 8.6 x 8.1 x 5.1 cm. There is an intrauterine gestation. There may be a subchorionic hemorrhage. The right ovary measures 3.2 x 1.1 x 2.3  cm and the left ovary measures 4.4 x 2.4 x 2.1 cm. The intrauterine gestation  crown-rump length measures 1.1 cm corresponding to a mean menstrual age of 7  weeks 3 days.  The yolk sac appears normal. The placenta cannot be evaluated due  to early gestational age. Heart rate measures 182 bpm.     IMPRESSION  IMPRESSION: Single viable intrauterine gestation with a mean menstrual age of 7  weeks 3 days +/- one week. As the patient declined the transvaginal scan, a  subchorionic hemorrhage cannot be excluded. It would be extremely useful to  obtain the transvaginal scan for further evaluation. MEDICATIONS GIVEN:  Medications   sodium chloride 0.9 % bolus infusion 1,000 mL (1,000 mL IntraVENous New Bag 5/7/17 1606)   ondansetron (ZOFRAN) injection 4 mg (4 mg IntraVENous Given 5/7/17 0576)   morphine injection 2 mg (2 mg IntraVENous Given 5/7/17 1607)       IMPRESSION:  1. Abdominal pain in pregnancy, first trimester        PLAN:  1. Current Discharge Medication List      START taking these medications    Details   HYDROcodone-acetaminophen (NORCO) 5-325 mg per tablet Take 1 Tab by mouth every four (4) hours as needed for Pain. Max Daily Amount: 6 Tabs. Qty: 12 Tab, Refills: 0           2. Follow-up Information     Follow up With Details Comments Contact Info    Your OBGYN Schedule an appointment as soon as possible for a visit      [facility] EMER[facility]ENCY DEPT  As needed, If symptoms worsen [address] [facility] [address] [phone]        Return to ED if worse       DISCHARGE NOTE  7:29 PM  The patient has been re-evaluated and is ready for discharge. Reviewed available results with patient. Counseled pt on diagnosis and care plan. Pt has expressed understanding, and all questions have been answered. Pt agrees with plan and agrees to follow up as recommended, or return to the ED if their symptoms worsen. Discharge instructions have been provided and explained to the pt, along with reasons to return to the ED. Attestations: This note is prepared by Wilda Maguire. Uri Weathers, acting as Scribe for [employer], DO.     [employer], DO: The scribe's documentation has been prepared under my direction and personally reviewed by me in its entirety. I confirm that the note above accurately reflects all work, treatment, procedures, and medical decision making performed by me.

## 2017-05-07 NOTE — DISCHARGE INSTRUCTIONS
Belly Pain in Pregnancy: Care Instructions  Your Care Instructions  When you're pregnant, any belly pain can be a worry. You may not want to call your doctor about every pain you have. But you don't want to miss something that is dangerous for you or your baby. Even if it feels familiar, belly pain can mean something new when you're pregnant. It's important to know when to call your doctor. It will also help to know how to care for yourself at home when your pain is not caused by anything harmful. · When belly pain is more severe or constant, see a doctor right away. · If you're sure your belly pain is a sign of labor, call your doctor. · When belly pain is brief, it's usually a normal part of pregnancy. It might be related to changes in the growing uterus. Or it could be the stretching of ligaments called round ligaments. These ligaments help support the uterus. Round ligament pain can be on either side of your belly. It can also be felt in your hips or groin. Follow-up care is a key part of your treatment and safety. Be sure to make and go to all appointments, and call your doctor if you are having problems. It's also a good idea to know your test results and keep a list of the medicines you take. How can you tell if belly pain is a sign of labor? When belly pain is caused by labor, it can feel like mild or menstrual-like cramps in your lower belly. These cramps are probably contractions. They can happen in your second or third trimester. You may also have:  · A steady, dull ache in your lower back, pelvis, or thighs. · A feeling of pressure in your pelvis or lower belly. · Changes in your vaginal discharge or a sudden release of fluid from the vagina. If you think you are in labor, call your doctor. How can you care for yourself at home? When belly pain is mild and is not a symptom of labor:  · Rest until you feel better. · Take a warm bath.   · Think about what you drink and eat:  ¨ Drink plenty of fluids. Choose water and other caffeine-free clear liquids until you feel better. ¨ Try eating small, frequent meals. If your stomach is upset, try bland, low-fat foods like plain rice, broiled chicken, toast, and yogurt. · Think about how you move if you are having brief pains from stretching of the round ligaments. ¨ Try gentle stretching. ¨ Move a little more slowly when turning in bed or getting up from a chair, so those ligaments don't stretch quickly. ¨ Lean forward a bit if you think you are going to cough or sneeze. When should you call for help? Call 911 anytime you think you may need emergency care. For example, call if:  · You have sudden, severe pain in your belly. · You have severe vaginal bleeding. Call your doctor now or seek immediate medical care if:  · You have new or worse belly pain or cramping. · You have any vaginal bleeding. · You have a fever. · You have symptoms of preeclampsia, such as:  ¨ Sudden swelling of your face, hands, or feet. ¨ New vision problems (such as dimness or blurring). ¨ A severe headache. · You think that you may be in labor. This means that you've had at least 8 contractions within 1 hour or at least 4 contractions within 20 minutes, even after you change your position and drink fluids. · You have symptoms of a urinary tract infection. These may include:  ¨ Pain or burning when you urinate. ¨ A frequent need to urinate without being able to pass much urine. ¨ Pain in the flank, which is just below the rib cage and above the waist on either side of the back. ¨ Blood in your urine. Watch closely for changes in your health, and be sure to contact your doctor if you are worried about your or your baby's health. Where can you learn more? Go to http://giselle-tara.info/. Enter [id number] in the search box to learn more about \"Belly Pain in Pregnancy: Care Instructions. \"  Current as of: June 8, 2016  Content Version: 11.2  © 2795-0736 [employer], Inc[employer]. Care instructions adapted under license by [employer] (which disclaims liability or warranty for this information). If you have questions about a medical condition or this instruction, always ask your healthcare professional. [place] [address] any warranty or liability for your use of this information.

## 2017-05-07 NOTE — ED NOTES
Patient requesting \"now dose\" of Art Yadav before discharge. Patient allergic to codeine, verbal order received for Norco. Dr. Rosa Barrett approved.

## 2017-05-08 PROCEDURE — 96365 THER/PROPH/DIAG IV INF INIT: CPT

## 2017-05-08 PROCEDURE — 86140 C-REACTIVE PROTEIN: CPT | Performed by: PHYSICIAN ASSISTANT

## 2017-05-08 PROCEDURE — 80053 COMPREHEN METABOLIC PANEL: CPT | Performed by: STUDENT IN AN ORGANIZED HEALTH CARE EDUCATION/TRAINING PROGRAM

## 2017-05-08 PROCEDURE — 96375 TX/PRO/DX INJ NEW DRUG ADDON: CPT

## 2017-05-08 PROCEDURE — 81001 URINALYSIS AUTO W/SCOPE: CPT | Performed by: STUDENT IN AN ORGANIZED HEALTH CARE EDUCATION/TRAINING PROGRAM

## 2017-05-08 PROCEDURE — 83605 ASSAY OF LACTIC ACID: CPT | Performed by: STUDENT IN AN ORGANIZED HEALTH CARE EDUCATION/TRAINING PROGRAM

## 2017-05-08 PROCEDURE — 36415 COLL VENOUS BLD VENIPUNCTURE: CPT | Performed by: STUDENT IN AN ORGANIZED HEALTH CARE EDUCATION/TRAINING PROGRAM

## 2017-05-08 PROCEDURE — 85025 COMPLETE CBC W/AUTO DIFF WBC: CPT | Performed by: STUDENT IN AN ORGANIZED HEALTH CARE EDUCATION/TRAINING PROGRAM

## 2017-05-08 PROCEDURE — 96376 TX/PRO/DX INJ SAME DRUG ADON: CPT

## 2017-05-08 PROCEDURE — 96360 HYDRATION IV INFUSION INIT: CPT

## 2017-05-08 PROCEDURE — 96361 HYDRATE IV INFUSION ADD-ON: CPT

## 2017-05-08 PROCEDURE — 99285 EMERGENCY DEPT VISIT HI MDM: CPT

## 2017-05-08 PROCEDURE — 83690 ASSAY OF LIPASE: CPT | Performed by: STUDENT IN AN ORGANIZED HEALTH CARE EDUCATION/TRAINING PROGRAM

## 2017-05-08 PROCEDURE — 87040 BLOOD CULTURE FOR BACTERIA: CPT | Performed by: STUDENT IN AN ORGANIZED HEALTH CARE EDUCATION/TRAINING PROGRAM

## 2017-05-08 PROCEDURE — 85652 RBC SED RATE AUTOMATED: CPT | Performed by: PHYSICIAN ASSISTANT

## 2017-05-08 PROCEDURE — 84702 CHORIONIC GONADOTROPIN TEST: CPT | Performed by: PHYSICIAN ASSISTANT

## 2017-05-08 NOTE — ED NOTES
Discharge instructions provided and patient verbalizes understanding. Patient stable and ambulatory out of ED with steady gait.

## 2017-05-09 ENCOUNTER — HOSPITAL ENCOUNTER (INPATIENT)
Age: 22
LOS: 2 days | Discharge: HOME | DRG: 386 | End: 2017-05-11
Attending: EMERGENCY MEDICINE | Admitting: FAMILY MEDICINE
Payer: SELF-PAY

## 2017-05-09 ENCOUNTER — APPOINTMENT (OUTPATIENT)
Dept: ULTRASOUND IMAGING | Age: 22
DRG: 386 | End: 2017-05-09
Attending: PHYSICIAN ASSISTANT
Payer: SELF-PAY

## 2017-05-09 DIAGNOSIS — Z3A.08 8 WEEKS GESTATION OF PREGNANCY: ICD-10-CM

## 2017-05-09 DIAGNOSIS — D72.825 BANDEMIA: ICD-10-CM

## 2017-05-09 DIAGNOSIS — R50.9 FEVER, UNKNOWN ORIGIN: Primary | ICD-10-CM

## 2017-05-09 PROBLEM — R65.10 SIRS (SYSTEMIC INFLAMMATORY RESPONSE SYNDROME) (HCC): Status: ACTIVE | Noted: 2017-05-09

## 2017-05-09 LAB
ALBUMIN SERPL BCP-MCNC: 2.8 G/DL (ref 3.5–5)
ALBUMIN/GLOB SERPL: 0.7 {RATIO} (ref 1.1–2.2)
ALP SERPL-CCNC: 124 U/L (ref 45–117)
ALT SERPL-CCNC: 41 U/L (ref 12–78)
ANION GAP BLD CALC-SCNC: 10 MMOL/L (ref 5–15)
ANION GAP BLD CALC-SCNC: 10 MMOL/L (ref 5–15)
ANION GAP BLD CALC-SCNC: 11 MMOL/L (ref 5–15)
APPEARANCE UR: ABNORMAL
AST SERPL W P-5'-P-CCNC: 10 U/L (ref 15–37)
BACTERIA URNS QL MICRO: ABNORMAL /HPF
BASOPHILS # BLD AUTO: 0 K/UL (ref 0–0.1)
BASOPHILS # BLD AUTO: 0 K/UL (ref 0–0.1)
BASOPHILS # BLD: 0 % (ref 0–1)
BASOPHILS # BLD: 0 % (ref 0–1)
BILIRUB SERPL-MCNC: 0.5 MG/DL (ref 0.2–1)
BILIRUB UR QL: NEGATIVE
BUN SERPL-MCNC: 2 MG/DL (ref 6–20)
BUN SERPL-MCNC: 4 MG/DL (ref 6–20)
BUN SERPL-MCNC: 6 MG/DL (ref 6–20)
BUN/CREAT SERPL: 4 (ref 12–20)
BUN/CREAT SERPL: 7 (ref 12–20)
BUN/CREAT SERPL: 8 (ref 12–20)
CALCIUM SERPL-MCNC: 7.9 MG/DL (ref 8.5–10.1)
CALCIUM SERPL-MCNC: 8 MG/DL (ref 8.5–10.1)
CALCIUM SERPL-MCNC: 8.8 MG/DL (ref 8.5–10.1)
CHLORIDE SERPL-SCNC: 103 MMOL/L (ref 97–108)
CHLORIDE SERPL-SCNC: 104 MMOL/L (ref 97–108)
CHLORIDE SERPL-SCNC: 97 MMOL/L (ref 97–108)
CLUE CELLS VAG QL WET PREP: NORMAL
CO2 SERPL-SCNC: 24 MMOL/L (ref 21–32)
CO2 SERPL-SCNC: 25 MMOL/L (ref 21–32)
CO2 SERPL-SCNC: 27 MMOL/L (ref 21–32)
COLOR UR: ABNORMAL
CREAT SERPL-MCNC: 0.45 MG/DL (ref 0.55–1.02)
CREAT SERPL-MCNC: 0.56 MG/DL (ref 0.55–1.02)
CREAT SERPL-MCNC: 0.71 MG/DL (ref 0.55–1.02)
CRP SERPL-MCNC: 19.9 MG/DL (ref 0–0.6)
DIFFERENTIAL METHOD BLD: ABNORMAL
DIFFERENTIAL METHOD BLD: ABNORMAL
EOSINOPHIL # BLD: 0.2 K/UL (ref 0–0.4)
EOSINOPHIL # BLD: 0.2 K/UL (ref 0–0.4)
EOSINOPHIL NFR BLD: 2 % (ref 0–7)
EOSINOPHIL NFR BLD: 2 % (ref 0–7)
EPITH CASTS URNS QL MICRO: ABNORMAL /LPF
ERYTHROCYTE [DISTWIDTH] IN BLOOD BY AUTOMATED COUNT: 18.6 % (ref 11.5–14.5)
ERYTHROCYTE [DISTWIDTH] IN BLOOD BY AUTOMATED COUNT: 18.9 % (ref 11.5–14.5)
ERYTHROCYTE [SEDIMENTATION RATE] IN BLOOD: 39 MM/HR (ref 0–20)
FLUAV AG NPH QL IA: NEGATIVE
FLUBV AG NOSE QL IA: NEGATIVE
GLOBULIN SER CALC-MCNC: 4.2 G/DL (ref 2–4)
GLUCOSE SERPL-MCNC: 125 MG/DL (ref 65–100)
GLUCOSE SERPL-MCNC: 148 MG/DL (ref 65–100)
GLUCOSE SERPL-MCNC: 97 MG/DL (ref 65–100)
GLUCOSE UR STRIP.AUTO-MCNC: 250 MG/DL
HCG SERPL-ACNC: ABNORMAL MIU/ML (ref 0–6)
HCT VFR BLD AUTO: 27.9 % (ref 35–47)
HCT VFR BLD AUTO: 31 % (ref 35–47)
HGB BLD-MCNC: 8.7 G/DL (ref 11.5–16)
HGB BLD-MCNC: 9.7 G/DL (ref 11.5–16)
HGB UR QL STRIP: NEGATIVE
KETONES UR QL STRIP.AUTO: 40 MG/DL
KOH PREP SPEC: NORMAL
LACTATE SERPL-SCNC: 0.7 MMOL/L (ref 0.4–2)
LACTATE SERPL-SCNC: 2.3 MMOL/L (ref 0.4–2)
LEUKOCYTE ESTERASE UR QL STRIP.AUTO: NEGATIVE
LIPASE SERPL-CCNC: 28 U/L (ref 73–393)
LYMPHOCYTES # BLD AUTO: 21 % (ref 12–49)
LYMPHOCYTES # BLD AUTO: 9 % (ref 12–49)
LYMPHOCYTES # BLD: 1 K/UL (ref 0.8–3.5)
LYMPHOCYTES # BLD: 2.5 K/UL (ref 0.8–3.5)
MAGNESIUM SERPL-MCNC: 1.5 MG/DL (ref 1.6–2.4)
MCH RBC QN AUTO: 23.9 PG (ref 26–34)
MCH RBC QN AUTO: 24.2 PG (ref 26–34)
MCHC RBC AUTO-ENTMCNC: 31.2 G/DL (ref 30–36.5)
MCHC RBC AUTO-ENTMCNC: 31.3 G/DL (ref 30–36.5)
MCV RBC AUTO: 76.6 FL (ref 80–99)
MCV RBC AUTO: 77.3 FL (ref 80–99)
MONOCYTES # BLD: 1.1 K/UL (ref 0–1)
MONOCYTES # BLD: 1.7 K/UL (ref 0–1)
MONOCYTES NFR BLD AUTO: 15 % (ref 5–13)
MONOCYTES NFR BLD AUTO: 9 % (ref 5–13)
MUCOUS THREADS URNS QL MICRO: ABNORMAL /LPF
NEUTS BAND NFR BLD MANUAL: 13 % (ref 0–6)
NEUTS BAND NFR BLD MANUAL: 16 % (ref 0–6)
NEUTS SEG # BLD: 8.3 K/UL (ref 1.8–8)
NEUTS SEG # BLD: 8.4 K/UL (ref 1.8–8)
NEUTS SEG NFR BLD AUTO: 52 % (ref 32–75)
NEUTS SEG NFR BLD AUTO: 61 % (ref 32–75)
NITRITE UR QL STRIP.AUTO: NEGATIVE
PH UR STRIP: 6 [PH] (ref 5–8)
PLATELET # BLD AUTO: 325 K/UL (ref 150–400)
PLATELET # BLD AUTO: 402 K/UL (ref 150–400)
POTASSIUM SERPL-SCNC: 2.9 MMOL/L (ref 3.5–5.1)
PROT SERPL-MCNC: 7 G/DL (ref 6.4–8.2)
PROT UR STRIP-MCNC: 30 MG/DL
RBC # BLD AUTO: 3.64 M/UL (ref 3.8–5.2)
RBC # BLD AUTO: 4.01 M/UL (ref 3.8–5.2)
RBC #/AREA URNS HPF: ABNORMAL /HPF (ref 0–5)
RBC MORPH BLD: ABNORMAL
SERVICE CMNT-IMP: NORMAL
SODIUM SERPL-SCNC: 135 MMOL/L (ref 136–145)
SODIUM SERPL-SCNC: 138 MMOL/L (ref 136–145)
SODIUM SERPL-SCNC: 138 MMOL/L (ref 136–145)
SP GR UR REFRACTOMETRY: 1.03 (ref 1–1.03)
T VAGINALIS VAG QL WET PREP: NORMAL
UROBILINOGEN UR QL STRIP.AUTO: 0.2 EU/DL (ref 0.2–1)
WBC # BLD AUTO: 11.3 K/UL (ref 3.6–11)
WBC # BLD AUTO: 12.1 K/UL (ref 3.6–11)
WBC MORPH BLD: ABNORMAL
WBC URNS QL MICRO: ABNORMAL /HPF (ref 0–4)

## 2017-05-09 PROCEDURE — 74011250636 HC RX REV CODE- 250/636: Performed by: PHYSICIAN ASSISTANT

## 2017-05-09 PROCEDURE — 87804 INFLUENZA ASSAY W/OPTIC: CPT | Performed by: PHYSICIAN ASSISTANT

## 2017-05-09 PROCEDURE — 87210 SMEAR WET MOUNT SALINE/INK: CPT | Performed by: PHYSICIAN ASSISTANT

## 2017-05-09 PROCEDURE — 36415 COLL VENOUS BLD VENIPUNCTURE: CPT | Performed by: FAMILY MEDICINE

## 2017-05-09 PROCEDURE — 80048 BASIC METABOLIC PNL TOTAL CA: CPT | Performed by: FAMILY MEDICINE

## 2017-05-09 PROCEDURE — 83605 ASSAY OF LACTIC ACID: CPT | Performed by: EMERGENCY MEDICINE

## 2017-05-09 PROCEDURE — 74011250637 HC RX REV CODE- 250/637: Performed by: PHYSICIAN ASSISTANT

## 2017-05-09 PROCEDURE — 85025 COMPLETE CBC W/AUTO DIFF WBC: CPT | Performed by: FAMILY MEDICINE

## 2017-05-09 PROCEDURE — 74011000258 HC RX REV CODE- 258: Performed by: FAMILY MEDICINE

## 2017-05-09 PROCEDURE — 87491 CHLMYD TRACH DNA AMP PROBE: CPT | Performed by: PHYSICIAN ASSISTANT

## 2017-05-09 PROCEDURE — 87077 CULTURE AEROBIC IDENTIFY: CPT | Performed by: HOSPITALIST

## 2017-05-09 PROCEDURE — 74011250637 HC RX REV CODE- 250/637: Performed by: HOSPITALIST

## 2017-05-09 PROCEDURE — 74011250636 HC RX REV CODE- 250/636: Performed by: FAMILY MEDICINE

## 2017-05-09 PROCEDURE — 65270000032 HC RM SEMIPRIVATE

## 2017-05-09 PROCEDURE — 87177 OVA AND PARASITES SMEARS: CPT | Performed by: HOSPITALIST

## 2017-05-09 PROCEDURE — 74011000258 HC RX REV CODE- 258: Performed by: PHYSICIAN ASSISTANT

## 2017-05-09 PROCEDURE — 76700 US EXAM ABDOM COMPLETE: CPT

## 2017-05-09 PROCEDURE — 76801 OB US < 14 WKS SINGLE FETUS: CPT

## 2017-05-09 PROCEDURE — 76817 TRANSVAGINAL US OBSTETRIC: CPT

## 2017-05-09 PROCEDURE — 83735 ASSAY OF MAGNESIUM: CPT | Performed by: FAMILY MEDICINE

## 2017-05-09 PROCEDURE — 87045 FECES CULTURE AEROBIC BACT: CPT | Performed by: HOSPITALIST

## 2017-05-09 PROCEDURE — 74011250636 HC RX REV CODE- 250/636: Performed by: EMERGENCY MEDICINE

## 2017-05-09 RX ORDER — ACETAMINOPHEN 325 MG/1
650 TABLET ORAL
Status: DISCONTINUED | OUTPATIENT
Start: 2017-05-09 | End: 2017-05-09

## 2017-05-09 RX ORDER — ONDANSETRON 2 MG/ML
4 INJECTION INTRAMUSCULAR; INTRAVENOUS
Status: COMPLETED | OUTPATIENT
Start: 2017-05-09 | End: 2017-05-09

## 2017-05-09 RX ORDER — SODIUM CHLORIDE 0.9 % (FLUSH) 0.9 %
5-10 SYRINGE (ML) INJECTION AS NEEDED
Status: DISCONTINUED | OUTPATIENT
Start: 2017-05-09 | End: 2017-05-11 | Stop reason: HOSPADM

## 2017-05-09 RX ORDER — SODIUM CHLORIDE 9 MG/ML
150 INJECTION, SOLUTION INTRAVENOUS CONTINUOUS
Status: DISCONTINUED | OUTPATIENT
Start: 2017-05-09 | End: 2017-05-10

## 2017-05-09 RX ORDER — ONDANSETRON 2 MG/ML
8 INJECTION INTRAMUSCULAR; INTRAVENOUS
Status: COMPLETED | OUTPATIENT
Start: 2017-05-09 | End: 2017-05-09

## 2017-05-09 RX ORDER — ACETAMINOPHEN 650 MG/1
650 SUPPOSITORY RECTAL
Status: DISCONTINUED | OUTPATIENT
Start: 2017-05-09 | End: 2017-05-09

## 2017-05-09 RX ORDER — MORPHINE SULFATE 2 MG/ML
4 INJECTION, SOLUTION INTRAMUSCULAR; INTRAVENOUS
Status: COMPLETED | OUTPATIENT
Start: 2017-05-09 | End: 2017-05-09

## 2017-05-09 RX ORDER — ONDANSETRON 2 MG/ML
4 INJECTION INTRAMUSCULAR; INTRAVENOUS
Status: DISCONTINUED | OUTPATIENT
Start: 2017-05-09 | End: 2017-05-11 | Stop reason: HOSPADM

## 2017-05-09 RX ORDER — ACETAMINOPHEN 325 MG/1
650 TABLET ORAL
Status: DISCONTINUED | OUTPATIENT
Start: 2017-05-09 | End: 2017-05-11 | Stop reason: HOSPADM

## 2017-05-09 RX ORDER — ACETAMINOPHEN 325 MG/1
650 TABLET ORAL
Status: COMPLETED | OUTPATIENT
Start: 2017-05-09 | End: 2017-05-09

## 2017-05-09 RX ORDER — POTASSIUM CHLORIDE 7.45 MG/ML
10 INJECTION INTRAVENOUS
Status: COMPLETED | OUTPATIENT
Start: 2017-05-09 | End: 2017-05-09

## 2017-05-09 RX ORDER — SODIUM CHLORIDE 0.9 % (FLUSH) 0.9 %
5-10 SYRINGE (ML) INJECTION EVERY 8 HOURS
Status: DISCONTINUED | OUTPATIENT
Start: 2017-05-09 | End: 2017-05-11 | Stop reason: HOSPADM

## 2017-05-09 RX ORDER — ONDANSETRON 2 MG/ML
INJECTION INTRAMUSCULAR; INTRAVENOUS
Status: DISPENSED
Start: 2017-05-09 | End: 2017-05-09

## 2017-05-09 RX ADMIN — Medication 10 ML: at 21:36

## 2017-05-09 RX ADMIN — POTASSIUM CHLORIDE 10 MEQ: 10 INJECTION, SOLUTION INTRAVENOUS at 11:34

## 2017-05-09 RX ADMIN — SODIUM CHLORIDE 1000 ML: 900 INJECTION, SOLUTION INTRAVENOUS at 05:15

## 2017-05-09 RX ADMIN — ACETAMINOPHEN 650 MG: 325 TABLET, FILM COATED ORAL at 02:17

## 2017-05-09 RX ADMIN — ACETAMINOPHEN 650 MG: 325 TABLET, FILM COATED ORAL at 14:48

## 2017-05-09 RX ADMIN — POTASSIUM CHLORIDE 10 MEQ: 10 INJECTION, SOLUTION INTRAVENOUS at 10:11

## 2017-05-09 RX ADMIN — POTASSIUM CHLORIDE 10 MEQ: 10 INJECTION, SOLUTION INTRAVENOUS at 07:25

## 2017-05-09 RX ADMIN — ACETAMINOPHEN 650 MG: 325 TABLET, FILM COATED ORAL at 21:36

## 2017-05-09 RX ADMIN — ONDANSETRON 4 MG: 2 INJECTION INTRAMUSCULAR; INTRAVENOUS at 14:49

## 2017-05-09 RX ADMIN — PIPERACILLIN SODIUM,TAZOBACTAM SODIUM 3.38 G: 3; .375 INJECTION, POWDER, FOR SOLUTION INTRAVENOUS at 21:36

## 2017-05-09 RX ADMIN — PIPERACILLIN SODIUM AND TAZOBACTAM SODIUM 3.38 G: 3; .375 INJECTION, POWDER, LYOPHILIZED, FOR SOLUTION INTRAVENOUS at 12:05

## 2017-05-09 RX ADMIN — PIPERACILLIN SODIUM AND TAZOBACTAM SODIUM 3.38 G: 3; .375 INJECTION, POWDER, LYOPHILIZED, FOR SOLUTION INTRAVENOUS at 05:18

## 2017-05-09 RX ADMIN — POTASSIUM CHLORIDE 10 MEQ: 10 INJECTION, SOLUTION INTRAVENOUS at 08:46

## 2017-05-09 RX ADMIN — SODIUM CHLORIDE 150 ML/HR: 900 INJECTION, SOLUTION INTRAVENOUS at 07:45

## 2017-05-09 RX ADMIN — SODIUM CHLORIDE 1000 ML: 900 INJECTION, SOLUTION INTRAVENOUS at 02:11

## 2017-05-09 RX ADMIN — Medication 10 ML: at 14:56

## 2017-05-09 RX ADMIN — Medication 10 ML: at 07:26

## 2017-05-09 RX ADMIN — ONDANSETRON 4 MG: 2 INJECTION INTRAMUSCULAR; INTRAVENOUS at 05:13

## 2017-05-09 RX ADMIN — Medication 4 MG: at 05:13

## 2017-05-09 RX ADMIN — PIPERACILLIN SODIUM,TAZOBACTAM SODIUM 3.38 G: 3; .375 INJECTION, POWDER, FOR SOLUTION INTRAVENOUS at 16:34

## 2017-05-09 RX ADMIN — SODIUM CHLORIDE 1000 ML: 900 INJECTION, SOLUTION INTRAVENOUS at 01:07

## 2017-05-09 RX ADMIN — ONDANSETRON 8 MG: 2 INJECTION INTRAMUSCULAR; INTRAVENOUS at 01:07

## 2017-05-09 NOTE — IP AVS SNAPSHOT
[address] 
[phone] Patient: Philippe Florence MRN: [id number] :1995 You are allergic to the following Allergen Reactions Codeine Anaphylaxis Recent Documentation Height Breastfeeding? OB Status Smoking Status 1.524 m No Pregnant Former Smoker Unresulted Labs Order Current Status OVA & PARASITES, STOOL In process CULTURE, BLOOD Preliminary result CULTURE, BLOOD Preliminary result CULTURE, STOOL Preliminary result Emergency Contacts  (Rel.) Home Phone Work Phone Mobile Phone Bryson Harrington (Spouse) [phone] -- -- About your hospitalization You were admitted on:  May 9, 2017 You last received care in the:  [facility] You were discharged on:  May 11, 2017 Why you were hospitalized Your primary diagnosis was:  Sirs (Systemic Inflammatory Response Syndrome) ([facility]) Providers Seen During Your Hospitalizations Provider Role Specialty Primary office phone Chantal Spencer MD Attending Provider Emergency Medicine [phone] Jaison Sims MD Attending Provider [facility] [phone] Nolan Foreman MD Attending Provider Internal Medicine [phone] Your Primary Care Physician (PCP) Primary Care Physician Office Phone Office Fax NONE ** None ** ** None ** Follow-up Information Follow up With Details Comments Contact Info MD Cortez Barrera [address] [phone] 
  
   office follow up needed None   None ([phone]) Patient stated that they have no PCP Current Discharge Medication List  
  
START taking these medications Dose & Instructions Dispensing Information Comments Morning Noon Evening Bedtime  
 amoxicillin-clavulanate 875-125 mg per tablet Commonly known as:  AUGMENTIN  
   
 Your last dose was: Your next dose is:    
   
   
 Dose:  1 Tab Take 1 Tab by mouth two (2) times a day for 7 days. Quantity:  14 Tab Refills:  0 CONTINUE these medications which have NOT CHANGED Dose & Instructions Dispensing Information Comments Morning Noon Evening Bedtime  
 balsalazide 750 mg capsule Commonly known as:  Yolyashley Patel Your last dose was: Your next dose is:    
   
   
 Dose:  2250 mg Take 2,250 mg by mouth three (3) times daily. Refills:  0 HYDROcodone-acetaminophen 5-325 mg per tablet Commonly known as:  Corby Brittle Your last dose was: Your next dose is:    
   
   
 Dose:  1 Tab Take 1 Tab by mouth every four (4) hours as needed for Pain. Max Daily Amount: 6 Tabs. Quantity:  12 Tab Refills:  0  
     
   
   
   
  
 ondansetron 4 mg disintegrating tablet Commonly known as:  ZOFRAN ODT Your last dose was: Your next dose is:    
   
   
 Dose:  4 mg Take 1 Tab by mouth every eight (8) hours as needed for Nausea for up to 10 doses. Quantity:  10 Tab Refills:  0  
     
   
   
   
  
 oxyCODONE-acetaminophen 5-325 mg per tablet Commonly known as:  PERCOCET Your last dose was: Your next dose is:    
   
   
 Dose:  1 Tab Take 1 Tab by mouth every four (4) hours as needed for Pain. Max Daily Amount: 6 Tabs. Quantity:  12 Tab Refills:  0  
     
   
   
   
  
 promethazine 25 mg tablet Commonly known as:  PHENERGAN Your last dose was: Your next dose is:    
   
   
 Dose:  25 mg Take 1 Tab by mouth every six (6) hours as needed for Nausea for up to 12 doses. Quantity:  12 Tab Refills:  0 Where to Get Your Medications Information on where to get these meds will be given to you by the nurse or doctor. ! Ask your nurse or doctor about these medications amoxicillin-clavulanate 875-125 mg per tablet Discharge Instructions Discharge Instructions PATIENT ID: Mile Puente MRN: [id number] YOB: 1995 DATE OF ADMISSION: 5/9/2017 12:55 AM   
DATE OF DISCHARGE: 5/11/2017 PRIMARY CARE PROVIDER: None ATTENDING PHYSICIAN: Ferdinand Soulier, MD 
DISCHARGING PROVIDER: Ferdinand Soulier, MD   
To contact this individual call [phone] and ask the  to page. If unavailable ask to be transferred the Adult Hospitalist Department. DISCHARGE DIAGNOSES Acute colitis, exacerbation ulcerative colitis SIRS 
 
CONSULTATIONS: IP CONSULT TO HOSPITALIST 
IP CONSULT TO OB GYN 
IP CONSULT TO GENERAL SURGERY 
IP CONSULT TO GASTROENTEROLOGY PROCEDURES/SURGERIES: * No surgery found * PENDING TEST RESULTS:  
At the time of discharge the following test results are still pending: none FOLLOW UP APPOINTMENTS:  
Follow-up Information Follow up With Details Comments Contact Info Bea Giordano MD   [address] [phone] 
  
   office follow up needed ADDITIONAL CARE RECOMMENDATIONS:  
 
DIET: Regular Diet ACTIVITY: Activity as tolerated WOUND CARE: NA 
 
EQUIPMENT needed: NA 
 
 
  
 SNF/Inpatient Rehab/LTAC Independent/assisted living Hospice Other:  
 
 
 
 
Signed: Melva Oneill MD 
5/11/2017 
9:31 AM 
 
Discharge Orders None [employer] Announcement We are excited to announce that we are making your provider's discharge notes available to you in [employer]. You will see these notes when they are completed and signed by the physician that discharged you from your recent hospital stay. If you have any questions or concerns about any information you see in [employer], please call the Health Information Department where you were seen or reach out to your Primary Care Provider for more information about your plan of care. Introducing [facility]! [facility] introduces [employer] patient portal. Now you can access parts of your medical record, email your doctor's office, and request medication refills online. 1. In your internet browser, go to [email or web address] 2. Click on the First Time User? Click Here link in the Sign In box. You will see the New Member Sign Up page. 3. Enter your [employer] Access Code exactly as it appears below. You will not need to use this code after youve completed the sign-up process. If you do not sign up before the expiration date, you must request a new code. · [employer] Access Code: [email or web address] Expires: 8/4/2017  9:46 AM 
 
4. Enter the last four digits of your Social Security Number (xxxx) and Date of Birth (mm/dd/yyyy) as indicated and click Submit. You will be taken to the next sign-up page. 5. Create a [employer] ID. This will be your [employer] login ID and cannot be changed, so think of one that is secure and easy to remember. 6. Create a [employer] password. You can change your password at any time. 7. Enter your Password Reset Question and Answer.  This can be used at a later time if you forget your password. 8. Enter your e-mail address. You will receive e-mail notification when new information is available in [address] 9. Click Sign Up. You can now view and download portions of your medical record. 10. Click the Download Summary menu link to download a portable copy of your medical information. If you have questions, please visit the Frequently Asked Questions section of the [employer] website. Remember, [employer] is NOT to be used for urgent needs. For medical emergencies, dial 911. Now available from your iPhone and Android! General Information Please provide this summary of care documentation to your next provider. Patient Signature:  ____________________________________________________________ Date:  ____________________________________________________________  
  
Erika French Lick Provider Signature:  ____________________________________________________________ Date:  ____________________________________________________________

## 2017-05-09 NOTE — ED TRIAGE NOTES
Triage:  Pt to ED due to unrelieved nausea/vomiting, abd cramping, fatigue, fever, and poor PO intake, and increased anxiety. Pt states symptoms have been worsening over the past week, Pt states she is currently 8 weeks preg. Pt states has been seen at [facility] for the same complaints and no source was found.

## 2017-05-09 NOTE — ED PROVIDER NOTES
HPI Comments: 23 yo approximately 8 week pregnant female here for evaluation of N/V, fever and abdominal pain. States began feeling \"bad\" one week ago with nausea and abdominal cramping. States began with fever last night. Seen in ER x 2  and ; had labs and US with no acute findings. Tonight with N/V continued pain and fever of 101. Denies cough, congestion, neck pain, sore throat, urinary symptoms. Former smoker. GYN Dr Breezy Cutler. Patient is a 24 y.o. female presenting with cramps, fever, vomiting, and headaches. The history is provided by the patient. Abdominal Cramping    This is a new problem. The current episode started more than 2 days ago. The problem occurs constantly. The problem has been gradually worsening. The pain is associated with an unknown factor. The pain is located in the LLQ. The quality of the pain is aching. The pain is at a severity of 8/10. The pain is moderate. Associated symptoms include a fever, nausea and vomiting. Nothing worsens the pain. The pain is relieved by nothing. Fever    Associated symptoms include vomiting. Pertinent negatives include no cough. Vomiting    Associated symptoms include chills, a fever and abdominal pain. Pertinent negatives include no cough. Headache    Associated symptoms include a fever, nausea and vomiting. Past Medical History:   Diagnosis Date    Crohn's colitis ([address]     H/O  section     PCOS (polycystic ovarian syndrome)     Placental abruption        Past Surgical History:   Procedure Laterality Date    HX  SECTION      x 1         History reviewed. No pertinent family history. Social History     Social History    Marital status: SINGLE     Spouse name: N/A    Number of children: N/A    Years of education: N/A     Occupational History    Not on file.      Social History Main Topics    Smoking status: Former Smoker    Smokeless tobacco: Not on file    Alcohol use No    Drug use: No    Sexual activity: Yes     Partners: Male     Birth control/ protection: None     Other Topics Concern    Not on file     Social History Narrative         ALLERGIES: Codeine    Review of Systems   Constitutional: Positive for chills and fever. HENT: Negative for facial swelling. Eyes: Negative for discharge. Respiratory: Negative for cough. Cardiovascular: Negative for leg swelling. Gastrointestinal: Positive for abdominal pain, nausea and vomiting. Negative for abdominal distention. Genitourinary: Negative for vaginal bleeding and vaginal discharge. Skin: Negative for color change. Neurological: Negative for seizures and syncope. Psychiatric/Behavioral: Negative for behavioral problems. Vitals:    05/09/17 0116 05/09/17 0130 05/09/17 0145 05/09/17 0200   BP: 96/53 104/48 100/47 98/55   Pulse: (!) 118      Resp: 20      Temp: (!) 101.4 °F (38.6 °C)      SpO2: 97% 97% 97% 99%   Height:                Physical Exam   Constitutional: She is oriented to person, place, and time. She appears well-developed and well-nourished. She appears distressed (moderate). HENT:   Head: Normocephalic and atraumatic. Right Ear: External ear normal.   Left Ear: External ear normal.   Nose: Nose normal.   Mouth/Throat: Oropharynx is clear and moist.   Eyes: Conjunctivae and EOM are normal. Pupils are equal, round, and reactive to light. Right eye exhibits no discharge. Left eye exhibits no discharge. Neck: Normal range of motion. Neck supple. Cardiovascular: Regular rhythm, normal heart sounds and intact distal pulses. Tachycardia    Pulmonary/Chest: Effort normal and breath sounds normal.   Abdominal: Soft. Bowel sounds are normal. She exhibits no distension. There is tenderness (Across lower abdomen). There is no rebound and no guarding. Genitourinary:   Genitourinary Comments: Performed by Bing Desai PA-C.     The external vulva and vagina are normal in appearance, without rash, lesions, discharge, ecchymosis or laceration. The speculum exam demonstrates normal vaginal mucosa without rash, lesions, ecchymosis or laceration; some white d/c noted. The cervix is normal in appearance without rash, lesions, discharge, ecchymosis or laceration. The bimanual exam demonstrates a(n) closed cervix without cervical motion tenderness. The uterus is not enlarged, and non-tender, without palpable masses. The adenexa are non-tender. NAZARIO Boyd     Musculoskeletal: Normal range of motion. She exhibits no edema or tenderness. Neurological: She is alert and oriented to person, place, and time. No cranial nerve deficit. Coordination normal.   Skin: Skin is warm and dry. No rash noted. Psychiatric: She has a normal mood and affect. Her behavior is normal. Judgment and thought content normal.   Nursing note and vitals reviewed. MDM  Number of Diagnoses or Management Options  8 weeks gestation of pregnancy:   Bandemia:   Fever, unknown origin:   Diagnosis management comments: 25 yo 5 week pregnant female with fever of 101; 3rd ED visit this week; pt with band on CBC; no source for fever; pt has been having abd pain and hx of crohns; US negative; will admit for further workup; Dr Phillip Vogt in and agrees with plan. NAZARIO Boyd         Amount and/or Complexity of Data Reviewed  Clinical lab tests: ordered and reviewed  Tests in the radiology section of CPT®: ordered and reviewed  Decide to obtain previous medical records or to obtain history from someone other than the patient: yes  Review and summarize past medical records: yes  Discuss the patient with other providers: yes      ED Course       Procedures      Patient has been reassessed. 's; additional fluids ordered. Discussed case with attending Physician Allyson; in to see. Patient has been reassessed. Reviewed labs, medications and radiographics with patient. States pain returning. Will consult Hospitalist for admission.  Enid LOPEZ Amarilis Nguyen Pair aware and will come to evaluate pt. NAZARIO Melara    Spoke to GYN on call for Dr Charlene Acevedo; Dr Nieves Sessions; their practice does not come to [facility] [place] but happily discussed case over phone; agrees to care and plan. NAZARIO Melara     Spoke to [facility] Dr Ramila Adair; aware of pt; Medical Hospitalist to admit as pregnancy stable at this time; consult if additional services needed.  NAZARIO Melara

## 2017-05-09 NOTE — ED NOTES
Bedside report given to [facility], RN . All questions answered. Pt resting comfortably. V/S stable, and no distress noted.

## 2017-05-09 NOTE — ROUTINE PROCESS
Report called to ROM T[facility]. Floor is ready to receive pt. Pt and family updated on plan of care. Pt with skin warm and dry. Respirations even and unlabored. Pt transferred to floor via wheelchair by ED staff.

## 2017-05-09 NOTE — H&P
[address] [facility] [place] [address]   HISTORY AND PHYSICAL       Name:  Jamaal Hernández   MR#:  [id number]   :  1995   Account #:  [de-identified]        Date of Adm:  2017       CHIEF COMPLAINT: Abdominal pain. HISTORY OF PRESENT ILLNESS: A 27-year-old white female with   past medical history of Crohn's colitis, polycystic ovarian syndrome,   presented to the emergency department from home with chief   complaint of abdominal pain. The patient is 8 weeks pregnant. She   notably has been having ongoing abdominal cramping and pain for   over the past 2 days, which remained constant, gradually worsened,   became severe, rated 8/10, aching without specific alleviating factors,   aggravated with any movement. She notes the pain is mostly located   in left lower quadrant, although generalized, nonradiating. She   complained of multiple episodes of nausea, vomiting, nonbloody,   nonbilious emesis, fever, generalized body aches, \"feels like a virus. \"   She also had headaches. She notably has been in the ED on   repeat visits. Each time she has been discharged home. She does not   complain of any dizziness, lightheadedness, focal weakness, new-  onset numbness, paresthesias, slurred speech, facial droop, chest   pain, palpitations, cough, congestion, back pain, sore throat, syncope,   loss of consciousness, melena, dysuria, hematuria, vaginal discharge,   vaginal bleeding, calf pain, swelling, edema, or rash. On arrival in the   emergency department, initial recorded vital signs were blood pressure   104/62, heart rate 126, respiratory rate 22, O2 saturation 100% on   room air. She was febrile with temperature 100.0 degrees Fahrenheit. The patient underwent a workup including labs showing lactic acid of   2.3. WBC of 11,300. She had a potassium of 2.9.  The patient is   accompanied by her  who notes that the patient has been   recently given potassium on prior ER visits. The patient, however, has   continued to have intractable vomiting since that time. The patient had   a qualitative hCG of 64,443. She underwent transvaginal OB   ultrasound which showed living intrauterine pregnancy at 8 weeks 0   days, otherwise unremarkable adnexa. Abdominal ultrasound showed   no acute process. Per the ED, the patient was started on Zosyn 3.375   grams IV for antibiotic coverage, morphine 4 mg IV for pain, Zofran 4   mg IV plus an additional 8 mg IV for nausea. The patient notably was   given 0.9% normal saline 1000 mL IV fluid bolus x3 (satisfies sepsis   protocol, IV fluids, suspected total of 30 mL/kg equals 1650 mL. ) The   patient is now seen for admission to our hospitalist service for   continued evaluation and treatment. Per discussion with the physician   assistant, she notes she will speak with [facility] hospitalist and has already   spoken with the patient's on-call OB/GYN which reportedly is at   [address]:   1. Crohn's colitis. 2. Polycystic ovarian syndrome. 3. Placental abruption. PAST SURGICAL HISTORY:  section. MEDICATIONS:   1. Zofran 4 mg disintegrating tablet p.o. 8 hours p.r.n.. 2. Percocet 5/325 mg 1 tablet p.o. every 4 hours p.r.n.. 3. Phenergan 25 mg p.o. every 6 hours p.r.n. Shelvy Setting ALLERGIES: CODEINE. SOCIAL HISTORY: Former smoker of cigarettes. Negative for alcohol,   negative for illicit drugs. FAMILY HISTORY: Diabetes mellitus in her grandfather. No   reported family members with heart attacks or strokes. REVIEW OF SYSTEMS   Eleven systems reviewed, pertinent positives as HPI, otherwise   negative. PHYSICAL EXAMINATION   VITAL SIGNS: Temperature maximum of 101.4 degrees Fahrenheit,   blood pressure 106/52, heart rate 100, respiratory rate 14, O2   saturation 99% on room air. Recorded weight 122 pounds (55.4   kg), recorded height of 5 feet 0 inches tall.    GENERAL: The patient in no acute respiratory distress. PSYCHIATRIC: The patient is awake, alert, oriented x3. NEUROLOGIC: GCS of 15. Moves extremities x4. Sensation is grossly   intact without slurred speech, facial droop. HEENT: Normocephalic, atraumatic. PERRLA is intact. Sclerae are   anicteric. Conjunctivae clear. Nares are patent. Oropharynx clear. Tongue is midline spine, not edematous. NECK: Supple, without lymphadenopathy, JVD, carotid bruits,   thyromegaly. LYMPH: Negative for cervical, supraclavicular adenopathy. RESPIRATORY: Lungs clear to auscultation bilaterally. CARDIOVASCULAR: Heart is tachycardic, regular rhythm. No   murmurs, rubs or gallops. GI: Abdomen soft, generalized tenderness on light palpation with   voluntary guarding, no rebound, no rigidity. No auscultated abdominal   bruits. No pulsatile mass. BACK: No CVA tenderness. No step-off deformity. MUSCULOSKELETAL: No acute palpable deformity. Negative for calf   tenderness. VASCULAR: 2+ radial, 1+ dorsalis pedis pulses, without cyanosis,   clubbing, or edema. SKIN: Warm and dry. LABORATORY DATA: I reviewed as follows: Sodium 135, potassium   2.9, chloride 97, CO2 of 27, BUN of 6, creatinine 0.71, glucose 148,   anion gap of 11, calcium 8.8. GFR greater than 60, total bilirubin 0.5,   total protein 7.0, albumin is 2.8, ALT of 41, AST of 10, alkaline   phosphatase 124, lipase 28. Lactic acid 2.3, repeat of 0.7. Quantitative   beta hCG [id number]. CRP 19.90. WBC of 11.3, hemoglobin 9.7,   hematocrit 31.0, platelets are 533, neutrophils 51%, bands 13%. Urinalysis: Leukocyte esterase negative, nitrites negative, epithelial   cells many, WBCs 0-4, RBCs 0-5, bacteria 1+, ketones 40, glucose   250, protein 30, pH 6.0, specific gravity 1.027, bilirubin negative, blood   negative, urobilinogen 0.2. Influenza A and B antigens negative, clue   cells absent and negative. Wet preps Trichomonas not seen. KOH   prep no yeast seen.     IMPRESSION AND PLAN: A 20-year-old female with noted past   medical history, now admitted with SIRS (systemic inflammatory   response syndrome), generalized abdominal pain, intractable nausea,   vomiting, lactic acidosis, fever. 1. SIRS - systemic inflammatory response syndrome. Admit the patient   to medical floor. Continue with IV fluid hydration- resuscitation. Continue with IV antibiotics as already started on Zosyn. Direct source   for infection unknown. Have to monitor her closely. 2. Generalized abdominal pain. Agree with consultation with OB/GYN. Will consult with general surgeon also for further evaluation. Keep   n.p.o. on IV fluids. 3. Intractable nausea and vomiting. Order Zofran 4 mg IV every 6   hours p.r.n.. 4. Hypokalemia. Order potassium chloride 10 mEq IV over 1 hour x4   runs. Repeat BMP and magnesium post-replacement. 5. Lactic acidosis. Continue with IV fluid resuscitation. Repeat lactic   acid levels every 4 hours. 6. Leukocytosis. Repeat CBC. 7. Anemia. Repeat hemoglobin and hematocrit. 8. Fever. Order Tylenol suppositories q.6h. p.r.n.. 9. Tachycardia. Continue to monitor closely. 10. Intrauterine pregnancy. The patient has a viable pregnancy. Consult with OB/GYN for further recommendations. 11. Venous thromboembolism prophylaxis. Sequential compression   devices lower extremities. DACIA Oleary MD MP / NATASHA   D:  05/09/2017   07:34   T:  05/09/2017   10:13   Job #:  [id number]

## 2017-05-09 NOTE — PROGRESS NOTES
05/09/17 1940   Vital Signs   Temp 99.9 °F (37.7 °C)   Temp Source Oral   Pulse (Heart Rate) (!) 104   Heart Rate Source Monitor   Resp Rate 18   O2 Sat (%) 98 %   Level of Consciousness Alert   BP 98/64   MAP (Calculated) 75   BP 1 Method Automatic   BP 1 Location Right arm   BP Patient Position At rest   MEWS Score 3   improved will continue to monitor

## 2017-05-09 NOTE — PROGRESS NOTES
TRANSFER - IN REPORT:    Verbal report received from Wanda(name) on [place]  being received from ED(unit) for routine progression of care      Report consisted of patients Situation, Background, Assessment and   Recommendations(SBAR). Information from the following report(s) SBAR, Kardex, ED Summary, MAR, Accordion and Recent Results was reviewed with the receiving nurse. Opportunity for questions and clarification was provided. Assessment completed upon patients arrival to unit and care assumed.

## 2017-05-09 NOTE — PROGRESS NOTES
Bedside and Verbal shift change report given to [facility] (oncoming nurse) by Wilmer Ward (offgoing nurse). Report included the following information SBAR, Kardex, Intake/Output, MAR and Accordion.

## 2017-05-09 NOTE — PROGRESS NOTES
Spoke to Dr. Marie Alvarez about patients MEWS score of 5. , temp of 101.6. Orders were received to discontinue tylenol suppository, start 650mg tylenol po q6hr.

## 2017-05-09 NOTE — PROGRESS NOTES
Bedside shift change report given to Stephie Ma RN (oncoming nurse) by Jessi Piña RN (offgoing nurse). Report included the following information SBAR and Kardex.

## 2017-05-09 NOTE — PROGRESS NOTES
Seen and examined ms. Jared Ferguson who was admitted this morning by Dr Caitlyn Charles    S: last vomited 12 hours ago,+abdominal pain,diarrhea, fever    O  Patient Vitals for the past 8 hrs:   Temp Pulse Resp BP SpO2   05/09/17 0838 (!) 100.6 °F (38.1 °C) (!) 113 16 101/52 99 %   05/09/17 0658 99.5 °F (37.5 °C) (!) 108 16 116/69 100 %         Alert and oriented  Cl;ear chest  Abdomen,mildly tender lower abdomen. normoactive  No extremity edema,rash    BMP:   Lab Results   Component Value Date/Time     05/09/2017 07:24 AM    K 2.9 (L) 05/09/2017 07:24 AM     05/09/2017 07:24 AM    CO2 25 05/09/2017 07:24 AM    AGAP 10 05/09/2017 07:24 AM    GLU 97 05/09/2017 07:24 AM    BUN 4 (L) 05/09/2017 07:24 AM    CREA 0.56 05/09/2017 07:24 AM    GFRAA >60 05/09/2017 07:24 AM    GFRNA >60 05/09/2017 07:24 AM          CBC WITH AUTOMATED DIFF    Collection Time: 05/09/17  7:24 AM   Result Value Ref Range    WBC 12.1 (H) 3.6 - 11.0 K/uL    RBC 3.64 (L) 3.80 - 5.20 M/uL    HGB 8.7 (L) 11.5 - 16.0 g/dL    HCT 27.9 (L) 35.0 - 47.0 %    MCV 76.6 (L) 80.0 - 99.0 FL    MCH 23.9 (L) 26.0 - 34.0 PG    MCHC 31.2 30.0 - 36.5 g/dL    RDW 18.9 (H) 11.5 - 14.5 %    PLATELET 274 237 - 960 K/uL    NEUTROPHILS 52 32 - 75 %    BAND NEUTROPHILS 16 (H) 0 - 6 %    LYMPHOCYTES 21 12 - 49 %    MONOCYTES 9 5 - 13 %    EOSINOPHILS 2 0 - 7 %    BASOPHILS 0 0 - 1 %    ABS. NEUTROPHILS 8.3 (H) 1.8 - 8.0 K/UL    ABS. LYMPHOCYTES 2.5 0.8 - 3.5 K/UL    ABS. MONOCYTES 1.1 (H) 0.0 - 1.0 K/UL    ABS. EOSINOPHILS 0.2 0.0 - 0.4 K/UL    ABS. BASOPHILS 0.0 0.0 - 0.1 K/UL    DF MANUAL      RBC COMMENTS ANISOCYTOSIS  1+        RBC COMMENTS HYPOCHROMIA  1+        RBC COMMENTS MICROCYTOSIS  1+        WBC COMMENTS DOHLE BODIES         A/p    SIRS ,no infectious source yet?  Viral vs colitis  Continue empiric abx  Continue the management as outlined by admission team  Add GI consult

## 2017-05-09 NOTE — CONSULTS
118 [facility].  217 [facility] 140 [facility], [address]  [phone]                     GI CONSULTATION NOTE  Eboni Dee, AGACNP-BC  Work Cell: [phone]      NAME:  Rafita Rizzo   :   1995   MRN:   [id number]       Referring Provider: Dr. Darcia Harada Date: 2017     Chief Complaint: Fever, abdominal pain, and hyperemesis    History of Present Illness:  Patient is a 24 y.o. who is seen in consultation at the request of Dr. Denise Vargas for nausea, vomiting and diarrhea. Ms. Ai Manuel has a PMH as detailed below including ulcerative colitis diagnosed via colonoscopy 2015. She presented to the hospital with fevers, abdominal pain, n/v and diarrhea. Onset of symptoms was 1-2 weeks ago. She has been to the ER multiple times for her symptoms. Pain is located in LLQ. It is constant, radiates to her LUQ/back and described as sharp in nature. She took Tylenol and Percocet at home with some relief. She had associated fevers, nausea, vomiting and diarrhea. She was taking Zantac and Phenergan at home for her n/v with some relief. She reports having multiple loose stools daily with occasional blood and mucous. Loose stools started after she restarted her prenatal vitamins, but had since improved after stopping them a week ago. Her symptoms progressively worsened which prompted her to keep coming back into the ER and now she reports Patrick Weiss will not let be go home. \" She denies any recent sick contacts (but states her partner is now getting sick), abx use, travel or hospitalizations. Colonoscopy 2015 demonstrated diffuse inflammation with biopsies revealing chronic active colitis consistent with ulcerative colitis. She was put on Lialda (1.2 g TID) for which she took until she got pregnant late last year. Her OB/GYN then took her off Lialda and prescribed a \"different pill\" the name of which she cannot recall but she never took this medication.  She has not taken any medication for her UC since being taking off Lialda. She lost her child in December and after this had a \"short flare\" in January which she managed on her own by taking Imodium. Her symptoms eventually resolved and she \"did not have any further issues. \"     She found out she was pregnant again a couple of weeks ago. She restarted taking Prenatal vitamins which she reports makes her symptoms worse. On admission, she was tachycardic and febrile with an elevated WBC and lactic acid. Abdominal ultrasound was unremarkable. She was given IV fluids and started on antibiotics. Stool studies pending. PMH:  Past Medical History:   Diagnosis Date    Crohn's colitis ([facility] [address] [facility]     H/O  section     PCOS (polycystic ovarian syndrome)     Placental abruption        PSH:  Past Surgical History:   Procedure Laterality Date    HX  SECTION      x 1       Allergies: Allergies   Allergen Reactions    Codeine Anaphylaxis       Home Medications:  Prior to Admission Medications   Prescriptions Last Dose Informant Patient Reported? Taking? HYDROcodone-acetaminophen (NORCO) 5-325 mg per tablet   No No   Sig: Take 1 Tab by mouth every four (4) hours as needed for Pain. Max Daily Amount: 6 Tabs. ondansetron (ZOFRAN ODT) 4 mg disintegrating tablet   No No   Sig: Take 1 Tab by mouth every eight (8) hours as needed for Nausea for up to 10 doses. oxyCODONE-acetaminophen (PERCOCET) 5-325 mg per tablet   No No   Sig: Take 1 Tab by mouth every four (4) hours as needed for Pain. Max Daily Amount: 6 Tabs. promethazine (PHENERGAN) 25 mg tablet   No No   Sig: Take 1 Tab by mouth every six (6) hours as needed for Nausea for up to 12 doses.       Facility-Administered Medications: None       Hospital Medications:  Current Facility-Administered Medications   Medication Dose Route Frequency    sodium chloride (NS) flush 5-10 mL  5-10 mL IntraVENous Q8H    sodium chloride (NS) flush 5-10 mL  5-10 mL IntraVENous PRN    0.9% sodium chloride infusion  150 mL/hr IntraVENous CONTINUOUS    piperacillin-tazobactam (ZOSYN) 3.375 g in 0.9% sodium chloride (MBP/ADV) 100 mL  3.375 g IntraVENous Q8H    ondansetron (ZOFRAN) injection 4 mg  4 mg IntraVENous Q6H PRN    acetaminophen (TYLENOL) tablet 650 mg  650 mg Oral Q6H PRN       Social History:  Social History   Substance Use Topics    Smoking status: Former Smoker    Smokeless tobacco: Not on file    Alcohol use No       Family History:  History reviewed. No pertinent family history. Review of Systems:    Constitutional: negative fever, negative chills, negative weight loss  Eyes:   negative visual changes  ENT:   negative sore throat, tongue or lip swelling  Respiratory:  negative cough, negative dyspnea  Cards:  negative for chest pain, palpitations, lower extremity edema  GI:   See HPI  :  negative for frequency, dysuria  Integument:  negative for rash and pruritus  Heme:  negative for easy bruising and gum/nose bleeding  Musculoskel: negative for myalgias, back pain and muscle weakness  Neuro: negative for headaches, dizziness, vertigo  Psych:  negative for feelings of anxiety, depression      Objective:   Patient Vitals for the past 8 hrs:   BP Temp Pulse Resp SpO2   05/09/17 1426 114/70 (!) 101.6 °F (38.7 °C) (!) 120 18 98 %   05/09/17 0838 101/52 (!) 100.6 °F (38.1 °C) (!) 113 16 99 %             PHYSICAL EXAM:  General: WD, WN. Alert, cooperative, no acute distress.    HEENT: NC, Atraumatic. PERRLA, EOMI. Anicteric sclerae. Lungs:  CTA Bilaterally. No Wheezing/Rhonchi/Rales. Heart:  Regular rate and rhythm, No murmur, No Rubs, No Gallops  Abdomen: Soft, non-distended, mild LLU and epigastric tenderness.  +Bowel sounds, no HSM  Extremities: No c/c/e  Neurologic:  Alert and oriented X 3. No acute neurological distress. Psych:   Good insight. Not anxious nor agitated.     Data Review     Recent Labs      05/09/17   0724  05/08/17   2317   WBC  12.1*  11.3*   HGB  8.7*  9.7*   HCT  27.9* 31.0*   PLT  325  402*     Recent Labs      05/09/17   0724  05/08/17   2317   NA  138  135*   K  2.9*  2.9*   CL  103  97   CO2  25  27   BUN  4*  6   CREA  0.56  0.71   GLU  97  148*   CA  7.9*  8.8     Recent Labs      05/08/17   2317  05/07/17   1550   SGOT  10*  20   AP  124*  112   TP  7.0  6.5   ALB  2.8*  2.7*   GLOB  4.2*  3.8   LPSE  28*   --      No results for input(s): INR, PTP, APTT in the last 72 hours. No lab exists for component: INREXT     Imaging studies reviewed      Assessment:   1. Abdominal pain, N/V and bloody diarrhea- with history of ulcerative colitis, leukocytosis, fevers and lactic acidosis. Differentials include possible ulcerative colitis flare vs infectious/viral etiology. 2. Ulcerative colitis  3. Pregnancy     Patient Active Problem List   Diagnosis Code    [facility] syndrome[facility] R65.10            Plan:   -Full liquids and may advance diet as tolerated  -IV fluids, pain control and anti-emetics as needed  -Stool studies pending   -Continue IV abx  -Recommend restarting IBD medications which she is supposed to be taking at home. However, patient refusing to take any medications for IBD at present.   -Discussed with Dr. Amber Chacon  -Will follow along with you  -Thank you kindly for allowing us to participate in the care of this patient    I have examined the patient. I have reviewed the chart and agree with the documentation recorded by the NP, including the assessment, treatment plan, and disposition. ASSESSMENT AND PLAN:  24 yr  Old lady has presented with bloody diarrhea, nausea, vomiting and crampy lower abdominal pain. Differential diagnosis includes exacerbation of underlying IBD likely ulcerative colitis or infectious diarrhea. Patient has stopped taking her IBD medications which may explain her symptoms. Recommend restarting her IBD medicine. However, she is adamantly refusing to take any IBD meds.   Stool C/S to rule out  Infectious causes. IV fluids. Zofran PRN. We will re-evaluate tomorrow.     Hugo Jesus MD

## 2017-05-09 NOTE — PROGRESS NOTES
Primary Nurse Skylar Manley RN and Itzel Bullock RN performed a dual skin assessment on this patient No impairment noted  Angel score is 23

## 2017-05-10 LAB
ANION GAP BLD CALC-SCNC: 7 MMOL/L (ref 5–15)
ANION GAP BLD CALC-SCNC: 7 MMOL/L (ref 5–15)
BASOPHILS # BLD AUTO: 0 K/UL (ref 0–0.1)
BASOPHILS # BLD: 0 % (ref 0–1)
BUN SERPL-MCNC: 3 MG/DL (ref 6–20)
BUN SERPL-MCNC: <1 MG/DL (ref 6–20)
BUN/CREAT SERPL: 8 (ref 12–20)
BUN/CREAT SERPL: ABNORMAL (ref 12–20)
C TRACH DNA SPEC QL NAA+PROBE: NEGATIVE
CALCIUM SERPL-MCNC: 8 MG/DL (ref 8.5–10.1)
CALCIUM SERPL-MCNC: 8.3 MG/DL (ref 8.5–10.1)
CHLORIDE SERPL-SCNC: 103 MMOL/L (ref 97–108)
CHLORIDE SERPL-SCNC: 107 MMOL/L (ref 97–108)
CO2 SERPL-SCNC: 23 MMOL/L (ref 21–32)
CO2 SERPL-SCNC: 27 MMOL/L (ref 21–32)
CREAT SERPL-MCNC: 0.39 MG/DL (ref 0.55–1.02)
CREAT SERPL-MCNC: 0.42 MG/DL (ref 0.55–1.02)
EOSINOPHIL # BLD: 0.1 K/UL (ref 0–0.4)
EOSINOPHIL NFR BLD: 1 % (ref 0–7)
ERYTHROCYTE [DISTWIDTH] IN BLOOD BY AUTOMATED COUNT: 18.7 % (ref 11.5–14.5)
GLUCOSE SERPL-MCNC: 105 MG/DL (ref 65–100)
GLUCOSE SERPL-MCNC: 91 MG/DL (ref 65–100)
HCT VFR BLD AUTO: 27.8 % (ref 35–47)
HGB BLD-MCNC: 8.7 G/DL (ref 11.5–16)
LYMPHOCYTES # BLD AUTO: 15 % (ref 12–49)
LYMPHOCYTES # BLD: 1.7 K/UL (ref 0.8–3.5)
MAGNESIUM SERPL-MCNC: 1.7 MG/DL (ref 1.6–2.4)
MCH RBC QN AUTO: 23.7 PG (ref 26–34)
MCHC RBC AUTO-ENTMCNC: 31.3 G/DL (ref 30–36.5)
MCV RBC AUTO: 75.7 FL (ref 80–99)
MONOCYTES # BLD: 1.9 K/UL (ref 0–1)
MONOCYTES NFR BLD AUTO: 18 % (ref 5–13)
N GONORRHOEA DNA SPEC QL NAA+PROBE: NEGATIVE
NEUTS SEG # BLD: 7.2 K/UL (ref 1.8–8)
NEUTS SEG NFR BLD AUTO: 66 % (ref 32–75)
PLATELET # BLD AUTO: 349 K/UL (ref 150–400)
POTASSIUM SERPL-SCNC: 2.8 MMOL/L (ref 3.5–5.1)
POTASSIUM SERPL-SCNC: 4.1 MMOL/L (ref 3.5–5.1)
RBC # BLD AUTO: 3.67 M/UL (ref 3.8–5.2)
SAMPLE TYPE: NORMAL
SERVICE CMNT-IMP: NORMAL
SODIUM SERPL-SCNC: 137 MMOL/L (ref 136–145)
SODIUM SERPL-SCNC: 137 MMOL/L (ref 136–145)
SPECIMEN SOURCE: NORMAL
WBC # BLD AUTO: 10.9 K/UL (ref 3.6–11)

## 2017-05-10 PROCEDURE — 80048 BASIC METABOLIC PNL TOTAL CA: CPT | Performed by: HOSPITALIST

## 2017-05-10 PROCEDURE — 74011000258 HC RX REV CODE- 258: Performed by: FAMILY MEDICINE

## 2017-05-10 PROCEDURE — 74011250637 HC RX REV CODE- 250/637: Performed by: HOSPITALIST

## 2017-05-10 PROCEDURE — 74011250637 HC RX REV CODE- 250/637: Performed by: INTERNAL MEDICINE

## 2017-05-10 PROCEDURE — 74011250636 HC RX REV CODE- 250/636: Performed by: HOSPITALIST

## 2017-05-10 PROCEDURE — 74011250636 HC RX REV CODE- 250/636: Performed by: FAMILY MEDICINE

## 2017-05-10 PROCEDURE — 36415 COLL VENOUS BLD VENIPUNCTURE: CPT | Performed by: HOSPITALIST

## 2017-05-10 PROCEDURE — 83735 ASSAY OF MAGNESIUM: CPT | Performed by: HOSPITALIST

## 2017-05-10 PROCEDURE — 65270000032 HC RM SEMIPRIVATE

## 2017-05-10 PROCEDURE — 74011250636 HC RX REV CODE- 250/636: Performed by: INTERNAL MEDICINE

## 2017-05-10 PROCEDURE — 85025 COMPLETE CBC W/AUTO DIFF WBC: CPT | Performed by: HOSPITALIST

## 2017-05-10 RX ORDER — POTASSIUM CHLORIDE 750 MG/1
40 TABLET, FILM COATED, EXTENDED RELEASE ORAL
Status: COMPLETED | OUTPATIENT
Start: 2017-05-10 | End: 2017-05-10

## 2017-05-10 RX ORDER — POTASSIUM CHLORIDE 7.45 MG/ML
10 INJECTION INTRAVENOUS ONCE
Status: COMPLETED | OUTPATIENT
Start: 2017-05-10 | End: 2017-05-10

## 2017-05-10 RX ORDER — POTASSIUM CHLORIDE AND SODIUM CHLORIDE 900; 300 MG/100ML; MG/100ML
INJECTION, SOLUTION INTRAVENOUS CONTINUOUS
Status: DISCONTINUED | OUTPATIENT
Start: 2017-05-10 | End: 2017-05-11 | Stop reason: HOSPADM

## 2017-05-10 RX ORDER — POTASSIUM CHLORIDE 20MEQ/15ML
40 LIQUID (ML) ORAL DAILY
Status: DISCONTINUED | OUTPATIENT
Start: 2017-05-10 | End: 2017-05-10

## 2017-05-10 RX ORDER — POTASSIUM CHLORIDE 750 MG/1
40 TABLET, FILM COATED, EXTENDED RELEASE ORAL DAILY
Status: DISCONTINUED | OUTPATIENT
Start: 2017-05-10 | End: 2017-05-10

## 2017-05-10 RX ORDER — CALCIUM CARBONATE 200(500)MG
200 TABLET,CHEWABLE ORAL AS NEEDED
Status: DISCONTINUED | OUTPATIENT
Start: 2017-05-10 | End: 2017-05-11 | Stop reason: HOSPADM

## 2017-05-10 RX ORDER — POTASSIUM CHLORIDE 7.45 MG/ML
10 INJECTION INTRAVENOUS
Status: DISPENSED | OUTPATIENT
Start: 2017-05-10 | End: 2017-05-10

## 2017-05-10 RX ORDER — BALSALAZIDE DISODIUM 750 MG/1
2250 CAPSULE ORAL 3 TIMES DAILY
COMMUNITY
End: 2017-07-11

## 2017-05-10 RX ADMIN — ONDANSETRON 4 MG: 2 INJECTION INTRAMUSCULAR; INTRAVENOUS at 03:53

## 2017-05-10 RX ADMIN — PIPERACILLIN SODIUM,TAZOBACTAM SODIUM 3.38 G: 3; .375 INJECTION, POWDER, FOR SOLUTION INTRAVENOUS at 14:32

## 2017-05-10 RX ADMIN — SODIUM CHLORIDE AND POTASSIUM CHLORIDE: 9; 2.98 INJECTION, SOLUTION INTRAVENOUS at 16:01

## 2017-05-10 RX ADMIN — ACETAMINOPHEN 650 MG: 325 TABLET, FILM COATED ORAL at 03:53

## 2017-05-10 RX ADMIN — SODIUM CHLORIDE AND POTASSIUM CHLORIDE: 9; 2.98 INJECTION, SOLUTION INTRAVENOUS at 23:11

## 2017-05-10 RX ADMIN — ONDANSETRON 4 MG: 2 INJECTION INTRAMUSCULAR; INTRAVENOUS at 18:42

## 2017-05-10 RX ADMIN — Medication 10 ML: at 07:09

## 2017-05-10 RX ADMIN — POTASSIUM CHLORIDE 10 MEQ: 10 INJECTION, SOLUTION INTRAVENOUS at 05:17

## 2017-05-10 RX ADMIN — POTASSIUM CHLORIDE 10 MEQ: 10 INJECTION, SOLUTION INTRAVENOUS at 07:30

## 2017-05-10 RX ADMIN — CALCIUM CARBONATE (ANTACID) CHEW TAB 500 MG 200 MG: 500 CHEW TAB at 19:00

## 2017-05-10 RX ADMIN — PIPERACILLIN SODIUM,TAZOBACTAM SODIUM 3.38 G: 3; .375 INJECTION, POWDER, FOR SOLUTION INTRAVENOUS at 06:33

## 2017-05-10 RX ADMIN — SODIUM CHLORIDE AND POTASSIUM CHLORIDE: 9; 2.98 INJECTION, SOLUTION INTRAVENOUS at 08:40

## 2017-05-10 RX ADMIN — POTASSIUM CHLORIDE 40 MEQ: 750 TABLET, FILM COATED, EXTENDED RELEASE ORAL at 03:10

## 2017-05-10 RX ADMIN — Medication 10 ML: at 14:32

## 2017-05-10 RX ADMIN — SODIUM CHLORIDE 150 ML/HR: 900 INJECTION, SOLUTION INTRAVENOUS at 07:09

## 2017-05-10 RX ADMIN — POTASSIUM CHLORIDE 10 MEQ: 10 INJECTION, SOLUTION INTRAVENOUS at 03:10

## 2017-05-10 RX ADMIN — PIPERACILLIN SODIUM,TAZOBACTAM SODIUM 3.38 G: 3; .375 INJECTION, POWDER, FOR SOLUTION INTRAVENOUS at 21:31

## 2017-05-10 RX ADMIN — POTASSIUM CHLORIDE 40 MEQ: 20 SOLUTION ORAL at 09:28

## 2017-05-10 RX ADMIN — POTASSIUM CHLORIDE 10 MEQ: 10 INJECTION, SOLUTION INTRAVENOUS at 11:57

## 2017-05-10 RX ADMIN — Medication 10 ML: at 21:32

## 2017-05-10 RX ADMIN — ONDANSETRON 4 MG: 2 INJECTION INTRAMUSCULAR; INTRAVENOUS at 11:55

## 2017-05-10 RX ADMIN — Medication 10 ML: at 03:53

## 2017-05-10 NOTE — PROGRESS NOTES
Bedside shift change report given to [address] [place] [address] [address] (oncoming nurse) by Ella Kolb (offgoing nurse). Report included the following information SBAR and Kardex.

## 2017-05-10 NOTE — PROGRESS NOTES
Spiritual Care Partner Volunteer visited patient in [address] on 5/10/17. Documented by:  Estevan Mckeon M.Div.    Paging Service [phone])

## 2017-05-10 NOTE — PROGRESS NOTES
Bedside shift change report given to Bulmaro Newton (oncoming nurse) by Stephie Ma (offgoing nurse). Report included the following information SBAR and Kardex.

## 2017-05-10 NOTE — PROGRESS NOTES
Bedside shift change report given to juan (oncoming nurse) by Stanislaw Prakash (offgoing nurse). Report included the following information SBAR.

## 2017-05-10 NOTE — PROGRESS NOTES
[address]  217 [address]   [phone]                GI PROGRESS NOTE  Eboni Dee, AGACNP-BC  Work Cell: [phone]      NAME:   Rafita Rizzo   :    1995   MRN:    [id number]     Assessment/Plan   1. Abdominal pain, N/V and bloody diarrhea- with history of ulcerative colitis, symptoms likely secondary to exacerbation of underlying IBD (ulcerative colitis) or infectious diarrhea. Pain has improved, although n/v and diarrhea persist. Afebrile overnight. WBC normalized. Stool studies unremarkable thus far. Continue supportive management and antibiotics. Recommend restarting her IBD medications, however she is adamantly refusing to take any IBD medications at this time. \"I have been managing my Crohn disease for 2 years and I know how to treat my disease. \" I have strongly urged her to make an appointment to see her GI doctor (Dr. Elli Castro) after discharge. We will see her PRN if needed. 2. Pregnancy     Patient Active Problem List   Diagnosis Code    [facility]ic inflammatory response syndrome) ([facility] R65.10       Subjective:     Reports mild improvement in abdominal pain, but continues to have n/v and diarrhea which she attributes to getting potassium repletion. Denies any fevers overnight.        Review of Systems    Constitutional: negative fever, negative chills, negative weight loss  Eyes:   negative visual changes  ENT:   negative sore throat, tongue or lip swelling  Respiratory:  negative cough, negative dyspnea  Cards:  negative for chest pain, palpitations, lower extremity edema  GI:   See HPI  :  negative for frequency, dysuria  Integument:  negative for rash and pruritus  Heme:  negative for easy bruising and gum/nose bleeding  Musculoskel: negative for myalgias, back pain and muscle weakness  Neuro: negative for headaches, dizziness, vertigo  Psych:  negative for feelings of anxiety, depression      Objective:     VITALS:   Last 24hrs VS reviewed since prior hospitalist progress note. Most recent are:  Visit Vitals    /63 (BP 1 Location: Right arm, BP Patient Position: At rest)    Pulse 77    Temp 97.4 °F (36.3 °C)    Resp 16    Ht 5' (1.524 m)    SpO2 97%    Breastfeeding No       Intake/Output Summary (Last 24 hours) at 05/10/17 1013  Last data filed at 05/09/17 1821   Gross per 24 hour   Intake             1455 ml   Output                0 ml   Net             1455 ml        PHYSICAL EXAM:  General   well developed, alert, in no acute distress  EENT  Normocephalic, Atraumatic, PERRLA, EOMI, sclera clear  Respiratory   Clear To Auscultation bilaterally - no wheezes, rales, rhonchi, or crackles  Cardiology  Regular Rate and Rythmn  - no murmurs, rubs or gallops  Abdominal  Soft, non-distended, mild LLQ tenderness, positive bowel sounds, no hepatosplenomegaly, no palpable mass  Extremities  No clubbing, cyanosis, or edema. Pulses intact. Neurological  No focal neurological deficits noted  Psychological  Oriented x 3. Normal affect.        Lab Data   Recent Results (from the past 12 hour(s))   METABOLIC PANEL, BASIC    Collection Time: 05/10/17 12:50 AM   Result Value Ref Range    Sodium 137 136 - 145 mmol/L    Potassium 2.8 (L) 3.5 - 5.1 mmol/L    Chloride 103 97 - 108 mmol/L    CO2 27 21 - 32 mmol/L    Anion gap 7 5 - 15 mmol/L    Glucose 91 65 - 100 mg/dL    BUN 3 (L) 6 - 20 MG/DL    Creatinine 0.39 (L) 0.55 - 1.02 MG/DL    BUN/Creatinine ratio 8 (L) 12 - 20      GFR est AA >60 >60 ml/min/1.73m2    GFR est non-AA >60 >60 ml/min/1.73m2    Calcium 8.3 (L) 8.5 - 10.1 MG/DL   CBC WITH AUTOMATED DIFF    Collection Time: 05/10/17 12:50 AM   Result Value Ref Range    WBC 10.9 3.6 - 11.0 K/uL    RBC 3.67 (L) 3.80 - 5.20 M/uL    HGB 8.7 (L) 11.5 - 16.0 g/dL    HCT 27.8 (L) 35.0 - 47.0 %    MCV 75.7 (L) 80.0 - 99.0 FL    MCH 23.7 (L) 26.0 - 34.0 PG    MCHC 31.3 30.0 - 36.5 g/dL    RDW 18.7 (H) 11.5 - 14.5 %    PLATELET 719 038 - 481 K/uL NEUTROPHILS 66 32 - 75 %    LYMPHOCYTES 15 12 - 49 %    MONOCYTES 18 (H) 5 - 13 %    EOSINOPHILS 1 0 - 7 %    BASOPHILS 0 0 - 1 %    ABS. NEUTROPHILS 7.2 1.8 - 8.0 K/UL    ABS. LYMPHOCYTES 1.7 0.8 - 3.5 K/UL    ABS. MONOCYTES 1.9 (H) 0.0 - 1.0 K/UL    ABS. EOSINOPHILS 0.1 0.0 - 0.4 K/UL    ABS. BASOPHILS 0.0 0.0 - 0.1 K/UL   MAGNESIUM    Collection Time: 05/10/17 12:50 AM   Result Value Ref Range    Magnesium 1.7 1.6 - 2.4 mg/dL         Medications: Reviewed    PMH/SH reviewed - no change compared to H&P  Mid-Level Provider: Wilfredo Ramirez NP   Date/Time:  5/10/2017   I have examined the patient. I have reviewed the chart and agree with the documentation recorded by the NP, including the assessment, treatment plan, and disposition.       Aneesh Farmer MD

## 2017-05-10 NOTE — PROGRESS NOTES
Hospitalist Progress Note  Diogenes Macario MD  Office: [phone]        Date of Service:  5/10/2017  NAME:  Matteo Juan  :  1995  MRN:  [id number]      Admission Summary:   A 19-year-old white female with past medical history of Crohn's colitis, polycystic ovarian syndrome,   presented to the emergency department from home with chief complaint of abdominal pain,diarrhea. She was admitted for SIRS. The patient is 8 weeks pregnant and Ob were consulted on admission. Interval history / Subjective:   A little better today     Assessment & Plan:     SIRS due to colitis likely exacerbation of UC  -Empiric antibiotics. IV fluids  -Symptoms improved afebrile. -WBC normalized. -GI seeing her, patient refusing IBD medications.  -She has balsalazide pills she was prescribed in October which she did not take. Nausea, vomiting diarrheas a result of UC flare: improving. Hypokalemia due to GI loss: replace and monitor   IUP 8 weeks: ob consulted on admission. Chronic anemia: vitamin supplements. Code status: full  DVT prophylaxis:Ambulates,scd    Care Plan discussed with: Patient/Family and Nurse   Disposition: Home w/Family     Hospital Problems  Date Reviewed: 2017          Codes Class Noted POA    * (Principal)S[facility]ic inflammatory response syndrome) [facility] ICD-10-CM: R65.10  ICD-9-CM: 995.90  2017 Unknown                Review of Systems:   Pertinent items are noted in HPI. Vital Signs:    Last 24hrs VS reviewed since prior progress note.  Most recent are:  Visit Vitals    /63 (BP 1 Location: Right arm, BP Patient Position: At rest)    Pulse 77    Temp 97.4 °F (36.3 °C)    Resp 16    Ht 5' (1.524 m)    SpO2 97%    Breastfeeding No         Intake/Output Summary (Last 24 hours) at 05/10/17 0907  Last data filed at 17 1821   Gross per 24 hour   Intake             1455 ml   Output 0 ml   Net             1455 ml        Physical Examination:             Constitutional:  No acute distress, cooperative, pleasant    ENT:  Oral mucous moist, oropharynx benign. Neck supple,    Resp:  CTA bilaterally. No wheezing/rhonchi/rales. No accessory muscle use   CV:  Regular rhythm, normal rate, no murmurs, gallops, rubs    GI:  Soft, slightly tender lower quadrants. normoactive bowel sounds, no hepatosplenomegaly     Musculoskeletal:  No edema, warm, 2+ pulses throughout    Neurologic:  Moves all extremities. AAOx3, CN II-XII reviewed            Data Review:    Review and/or order of clinical lab test  Review and/or order of tests in the radiology section of [facility]  Review and/or order of tests in the medicine section of [facility]      Labs:     Recent Labs      05/10/17   0050  05/09/17   0724   WBC  10.9  12.1*   HGB  8.7*  8.7*   HCT  27.8*  27.9*   PLT  349  325     Recent Labs      05/10/17   0050  05/09/17   1809  05/09/17   0724   NA  137  138  138   K  2.8*  2.9*  2.9*   CL  103  104  103   CO2  27  24  25   BUN  3*  2*  4*   CREA  0.39*  0.45*  0.56   GLU  91  125*  97   CA  8.3*  8.0*  7.9*   MG  1.7  1.5*   --      Recent Labs      05/08/17   2317  05/07/17   1550   SGOT  10*  20   ALT  41  52   AP  124*  112   TBILI  0.5  0.4   TP  7.0  6.5   ALB  2.8*  2.7*   GLOB  4.2*  3.8   LPSE  28*   --      No results for input(s): INR, PTP, APTT in the last 72 hours. No lab exists for component: INREXT   No results for input(s): FE, TIBC, PSAT, FERR in the last 72 hours. No results found for: FOL, RBCF   No results for input(s): PH, PCO2, PO2 in the last 72 hours. No results for input(s): CPK, CKNDX, TROIQ in the last 72 hours.     No lab exists for component: CPKMB  No results found for: CHOL, CHOLX, CHLST, CHOLV, HDL, LDL, DLDL, LDLC, DLDLP, TGL, TGLX, TRIGL, TRIGP, CHHD, CHHDX  No results found for: [facility]  Lab Results   Component Value Date/Time    Color YELLOW/STRAW 05/08/2017 11:18 PM    Appearance CLOUDY 05/08/2017 11:18 PM    Specific gravity 1.027 05/08/2017 11:18 PM    Specific gravity >1.030 05/06/2017 10:01 AM    pH (UA) 6.0 05/08/2017 11:18 PM    Protein 30 05/08/2017 11:18 PM    Glucose 250 05/08/2017 11:18 PM    Ketone 40 05/08/2017 11:18 PM    Bilirubin NEGATIVE  05/08/2017 11:18 PM    Urobilinogen 0.2 05/08/2017 11:18 PM    Nitrites NEGATIVE  05/08/2017 11:18 PM    Leukocyte Esterase NEGATIVE  05/08/2017 11:18 PM    Epithelial cells MANY 05/08/2017 11:18 PM    Bacteria 1+ 05/08/2017 11:18 PM    WBC 0-4 05/08/2017 11:18 PM    RBC 0-5 05/08/2017 11:18 PM         Medications Reviewed:     Current Facility-Administered Medications   Medication Dose Route Frequency    0.9% sodium chloride with KCl 40 mEq/L infusion   IntraVENous CONTINUOUS    potassium chloride (KAON 10%) 20 mEq/15 mL oral liquid 40 mEq  40 mEq Oral DAILY    sodium chloride (NS) flush 5-10 mL  5-10 mL IntraVENous Q8H    sodium chloride (NS) flush 5-10 mL  5-10 mL IntraVENous PRN    piperacillin-tazobactam (ZOSYN) 3.375 g in 0.9% sodium chloride (MBP/ADV) 100 mL  3.375 g IntraVENous Q8H    ondansetron (ZOFRAN) injection 4 mg  4 mg IntraVENous Q6H PRN    acetaminophen (TYLENOL) tablet 650 mg  650 mg Oral Q6H PRN     ______________________________________________________________________  EXPECTED LENGTH OF STAY: 2d 16h  ACTUAL LENGTH OF STAY:          1                 Robbie Montalvo MD

## 2017-05-11 VITALS
OXYGEN SATURATION: 100 % | DIASTOLIC BLOOD PRESSURE: 65 MMHG | HEIGHT: 60 IN | SYSTOLIC BLOOD PRESSURE: 115 MMHG | RESPIRATION RATE: 16 BRPM | HEART RATE: 82 BPM | TEMPERATURE: 98.5 F

## 2017-05-11 PROBLEM — R65.10 SIRS (SYSTEMIC INFLAMMATORY RESPONSE SYNDROME) (HCC): Status: RESOLVED | Noted: 2017-05-09 | Resolved: 2017-05-11

## 2017-05-11 LAB
ANION GAP BLD CALC-SCNC: 6 MMOL/L (ref 5–15)
BUN SERPL-MCNC: <1 MG/DL (ref 6–20)
BUN/CREAT SERPL: ABNORMAL (ref 12–20)
CALCIUM SERPL-MCNC: 8.4 MG/DL (ref 8.5–10.1)
CHLORIDE SERPL-SCNC: 107 MMOL/L (ref 97–108)
CO2 SERPL-SCNC: 25 MMOL/L (ref 21–32)
CREAT SERPL-MCNC: 0.43 MG/DL (ref 0.55–1.02)
GLUCOSE SERPL-MCNC: 84 MG/DL (ref 65–100)
POTASSIUM SERPL-SCNC: 4.1 MMOL/L (ref 3.5–5.1)
SODIUM SERPL-SCNC: 138 MMOL/L (ref 136–145)

## 2017-05-11 PROCEDURE — 74011250636 HC RX REV CODE- 250/636: Performed by: HOSPITALIST

## 2017-05-11 PROCEDURE — 74011000258 HC RX REV CODE- 258: Performed by: FAMILY MEDICINE

## 2017-05-11 PROCEDURE — 80048 BASIC METABOLIC PNL TOTAL CA: CPT | Performed by: HOSPITALIST

## 2017-05-11 PROCEDURE — 74011250637 HC RX REV CODE- 250/637: Performed by: HOSPITALIST

## 2017-05-11 PROCEDURE — 74011250636 HC RX REV CODE- 250/636: Performed by: FAMILY MEDICINE

## 2017-05-11 PROCEDURE — 36415 COLL VENOUS BLD VENIPUNCTURE: CPT | Performed by: HOSPITALIST

## 2017-05-11 RX ORDER — AMOXICILLIN AND CLAVULANATE POTASSIUM 875; 125 MG/1; MG/1
1 TABLET, FILM COATED ORAL 2 TIMES DAILY
Qty: 14 TAB | Refills: 0 | Status: SHIPPED | OUTPATIENT
Start: 2017-05-11 | End: 2017-05-18

## 2017-05-11 RX ADMIN — PIPERACILLIN SODIUM,TAZOBACTAM SODIUM 3.38 G: 3; .375 INJECTION, POWDER, FOR SOLUTION INTRAVENOUS at 07:10

## 2017-05-11 RX ADMIN — ONDANSETRON 4 MG: 2 INJECTION INTRAMUSCULAR; INTRAVENOUS at 07:21

## 2017-05-11 RX ADMIN — ACETAMINOPHEN 650 MG: 325 TABLET, FILM COATED ORAL at 07:10

## 2017-05-11 RX ADMIN — SODIUM CHLORIDE AND POTASSIUM CHLORIDE: 9; 2.98 INJECTION, SOLUTION INTRAVENOUS at 05:59

## 2017-05-11 RX ADMIN — Medication 10 ML: at 07:10

## 2017-05-11 NOTE — DISCHARGE INSTRUCTIONS
Discharge Instructions       PATIENT ID: Abe Godfrey  MRN: [id number]   YOB: 1995    DATE OF ADMISSION: 5/9/2017 12:55 AM    DATE OF DISCHARGE: 5/11/2017    PRIMARY CARE PROVIDER: None     ATTENDING PHYSICIAN: Evelia Boswell MD  DISCHARGING PROVIDER: Evelia Boswell MD    To contact this individual call [phone] and ask the  to page. If unavailable ask to be transferred the Adult Hospitalist Department. DISCHARGE DIAGNOSES   Acute colitis, exacerbation ulcerative colitis  SIRS    CONSULTATIONS: IP CONSULT TO HOSPITALIST  IP CONSULT TO OB GYN  IP CONSULT TO GENERAL SURGERY  IP CONSULT TO GASTROENTEROLOGY    PROCEDURES/SURGERIES: * No surgery found *    PENDING TEST RESULTS:   At the time of discharge the following test results are still pending: none    FOLLOW UP APPOINTMENTS:   Follow-up Information     Follow up With Details Comments [address] [place] [id number] [place] [id number] [phone]        office follow up needed            ADDITIONAL CARE RECOMMENDATIONS:     DIET: Regular Diet    ACTIVITY: Activity as tolerated    WOUND CARE: NA    EQUIPMENT needed: NA      DISCHARGE MEDICATIONS:   See Medication Reconciliation Form    · It is important that you take the medication exactly as they are prescribed. · Keep your medication in the bottles provided by the pharmacist and keep a list of the medication names, dosages, and times to be taken in your wallet. · Do not take other medications without consulting your doctor. NOTIFY YOUR PHYSICIAN FOR ANY OF THE FOLLOWING:   Fever over 101 degrees for 24 hours. Chest pain, shortness of breath, fever, chills, nausea, vomiting, diarrhea, change in mentation, falling, weakness, bleeding. Severe pain or pain not relieved by medications. Or, any other signs or symptoms that you may have questions about.       DISPOSITION:  x  Home With:   OT  PT  [facility]  RN       SNF/Inpatient Rehab/LTAC Independent/assisted living    Hospice    Other:           Signed:    Daniel Burgess MD  5/11/2017  9:31 AM

## 2017-05-11 NOTE — DISCHARGE SUMMARY
Discharge Summary       PATIENT ID: Rafita Rizzo  MRN: [id number]   YOB: 1995    DATE OF ADMISSION: 5/9/2017 12:55 AM    DATE OF DISCHARGE: 5/11/2017  PRIMARY CARE PROVIDER: None     ATTENDING PHYSICIAN: Viki Corbin MD  DISCHARGING PROVIDER: Viki Corbin MD    To contact this individual call [phone] and ask the  to page. If unavailable ask to be transferred the Adult Hospitalist Department. CONSULTATIONS: IP CONSULT TO HOSPITALIST  IP CONSULT TO OB GYN  IP CONSULT TO GENERAL SURGERY  IP CONSULT TO GASTROENTEROLOGY    PROCEDURES/SURGERIES: * No surgery found *    ADMITTING [address] [place] [address] COURSE:   Admission Summary:   A 22-year-old white female with past medical history of Crohn's colitis, polycystic ovarian syndrome,   presented to the emergency department from home with chief complaint of abdominal pain,diarrhea. She was admitted for SIRS. The patient is 8 weeks pregnant and Ob were consulted on admission. Interval history / Subjective:   Much better. Sim Gang to go home. Assessment & Plan:    SIRS due to colitis likely exacerbation of UC  -This acute episode resolved with empiric antibiotics,supportive care with IV fluids  -WBC normalized. -GI seeing her, patient refusing IBD medications.  -She has balsalazide pills she was prescribed in October which she did not take. I have advised her to see her GI doctor, Dr Elli alcaraz. Nausea, vomiting diarrheas a result of UC flare:Resolved  Hypokalemia due to GI loss: corrected  IUP 8 weeks: ob consulted on admission. Chronic anemia: vitamin supplements. Patient medically stable and discharged home. DISCHARGE DIAGNOSES / PLAN:      See above.        PENDING TEST RESULTS:   At the time of discharge the following test results are still pending: none    FOLLOW UP APPOINTMENTS:    Follow-up Information     Follow up With Details Comments [address], [place]   [address] [place] [address] [address]  [phone]        office follow up needed            ADDITIONAL CARE RECOMMENDATIONS:     DIET: Regular Diet    ACTIVITY: Activity as tolerated    WOUND CARE: NA    EQUIPMENT needed: NA      DISCHARGE MEDICATIONS:  Current Discharge Medication List      START taking these medications    Details   amoxicillin-clavulanate (AUGMENTIN) 875-125 mg per tablet Take 1 Tab by mouth two (2) times a day for 7 days. Qty: 14 Tab, Refills: 0         CONTINUE these medications which have NOT CHANGED    Details   balsalazide (COLAZAL) 750 mg capsule Take 2,250 mg by mouth three (3) times daily. HYDROcodone-acetaminophen (NORCO) 5-325 mg per tablet Take 1 Tab by mouth every four (4) hours as needed for Pain. Max Daily Amount: 6 Tabs. Qty: 12 Tab, Refills: 0      oxyCODONE-acetaminophen (PERCOCET) 5-325 mg per tablet Take 1 Tab by mouth every four (4) hours as needed for Pain. Max Daily Amount: 6 Tabs. Qty: 12 Tab, Refills: 0      ondansetron (ZOFRAN ODT) 4 mg disintegrating tablet Take 1 Tab by mouth every eight (8) hours as needed for Nausea for up to 10 doses. Qty: 10 Tab, Refills: 0      promethazine (PHENERGAN) 25 mg tablet Take 1 Tab by mouth every six (6) hours as needed for Nausea for up to 12 doses. Qty: 12 Tab, Refills: 0               NOTIFY YOUR PHYSICIAN FOR ANY OF THE FOLLOWING:   Fever over 101 degrees for 24 hours. Chest pain, shortness of breath, fever, chills, nausea, vomiting, diarrhea, change in mentation, falling, weakness, bleeding. Severe pain or pain not relieved by medications. Or, any other signs or symptoms that you may have questions about.     DISPOSITION:  x  Home With:   OT  PT  HH  RN       Long term SNF/Inpatient Rehab    Independent/assisted living    Hospice    Other:       PATIENT CONDITION AT DISCHARGE:     Functional status    Poor     Deconditioned    x Independent      Cognition   x  Lucid     Forgetful     Dementia      Catheters/lines (plus indication) [place]     PICC     PEG    x None      Code status   x  Full code     DNR      PHYSICAL EXAMINATION AT DISCHARGE:     Visit Vitals    /65 (BP 1 Location: Left arm, BP Patient Position: At rest)    Pulse 87    Temp 98.4 °F (36.9 °C)    Resp 17    Ht 5' (1.524 m)    SpO2 97%    Breastfeeding No      O2 Device: Room air    Temp (24hrs), Av.7 °F (37.1 °C), Min:98.4 °F (36.9 °C), Max:99.3 °F (37.4 °C)         190 -  0700  In: 2286 [I.V.:2286]  Out: -         Constitutional:  No acute distress, cooperative, pleasant    ENT:  Oral mucous moist, oropharynx benign. Neck supple,    Resp:  CTA bilaterally. No wheezing/rhonchi/rales. No accessory muscle use   CV:  Regular rhythm, normal rate, no murmurs, gallops, rubs    GI:  Soft, slightly tender lower quadrants. normoactive bowel sounds, no hepatosplenomegaly     Musculoskeletal:  No edema, warm, 2+ pulses throughout    Neurologic:  Moves all extremities. AAOx3, CN II-XII reviewed           CHRONIC MEDICAL DIAGNOSES:  Problem List as of 2017  Date Reviewed: 2017          Codes Class Noted - Resolved    * (Principal)RESOLVED: SIRS (systemic inflammatory response syndrome) ([facility] ICD-10-CM: R65.10  ICD-9-CM: 995.90  2017 - 2017                Signed:    Franklyn Aranda MD  2017  9:33 AM

## 2017-05-11 NOTE — PROGRESS NOTES
..Bedside shift change report given to Stephan Ellis RN (oncoming nurse) by Chel Maradiaga Rn (offgoing nurse). Report included the following information SBAR, Kardex and MAR.

## 2017-05-12 LAB
BACTERIA SPEC CULT: NORMAL
C JEJUNI+C COLI AG STL QL: NEGATIVE
E COLI SXT1+2 STL IA: NEGATIVE
O+P SPEC MICRO: NORMAL
O+P STL CONC: NORMAL
SERVICE CMNT-IMP: NORMAL
SPECIMEN SOURCE: NORMAL

## 2017-05-14 LAB
BACTERIA SPEC CULT: NORMAL
BACTERIA SPEC CULT: NORMAL
SERVICE CMNT-IMP: NORMAL
SERVICE CMNT-IMP: NORMAL

## 2017-06-25 ENCOUNTER — APPOINTMENT (OUTPATIENT)
Dept: ULTRASOUND IMAGING | Age: 22
End: 2017-06-25
Attending: EMERGENCY MEDICINE
Payer: MEDICAID

## 2017-06-25 ENCOUNTER — HOSPITAL ENCOUNTER (EMERGENCY)
Age: 22
Discharge: HOME | End: 2017-06-25
Attending: EMERGENCY MEDICINE
Payer: MEDICAID

## 2017-06-25 VITALS
WEIGHT: 123.46 LBS | RESPIRATION RATE: 18 BRPM | SYSTOLIC BLOOD PRESSURE: 136 MMHG | OXYGEN SATURATION: 100 % | HEART RATE: 123 BPM | DIASTOLIC BLOOD PRESSURE: 122 MMHG | HEIGHT: 60 IN | BODY MASS INDEX: 24.24 KG/M2 | TEMPERATURE: 98 F

## 2017-06-25 DIAGNOSIS — O20.0 THREATENED ABORTION: Primary | ICD-10-CM

## 2017-06-25 LAB
ABO + RH BLD: NORMAL
BASOPHILS # BLD AUTO: 0 K/UL (ref 0–0.1)
BASOPHILS # BLD: 0 % (ref 0–1)
BLOOD BANK CMNT PATIENT-IMP: NORMAL
EOSINOPHIL # BLD: 0.1 K/UL (ref 0–0.4)
EOSINOPHIL NFR BLD: 1 % (ref 0–7)
ERYTHROCYTE [DISTWIDTH] IN BLOOD BY AUTOMATED COUNT: 16.3 % (ref 11.5–14.5)
HCG SERPL-ACNC: ABNORMAL MIU/ML (ref 0–6)
HCT VFR BLD AUTO: 28.5 % (ref 35–47)
HGB BLD-MCNC: 9.3 G/DL (ref 11.5–16)
LYMPHOCYTES # BLD AUTO: 15 % (ref 12–49)
LYMPHOCYTES # BLD: 1.5 K/UL (ref 0.8–3.5)
MCH RBC QN AUTO: 26 PG (ref 26–34)
MCHC RBC AUTO-ENTMCNC: 32.6 G/DL (ref 30–36.5)
MCV RBC AUTO: 79.6 FL (ref 80–99)
MONOCYTES # BLD: 0.9 K/UL (ref 0–1)
MONOCYTES NFR BLD AUTO: 9 % (ref 5–13)
NEUTS SEG # BLD: 7.7 K/UL (ref 1.8–8)
NEUTS SEG NFR BLD AUTO: 75 % (ref 32–75)
PLATELET # BLD AUTO: 346 K/UL (ref 150–400)
RBC # BLD AUTO: 3.58 M/UL (ref 3.8–5.2)
WBC # BLD AUTO: 10.2 K/UL (ref 3.6–11)

## 2017-06-25 PROCEDURE — 84702 CHORIONIC GONADOTROPIN TEST: CPT | Performed by: EMERGENCY MEDICINE

## 2017-06-25 PROCEDURE — 86900 BLOOD TYPING SEROLOGIC ABO: CPT | Performed by: EMERGENCY MEDICINE

## 2017-06-25 PROCEDURE — 76815 OB US LIMITED FETUS(S): CPT

## 2017-06-25 PROCEDURE — 85025 COMPLETE CBC W/AUTO DIFF WBC: CPT | Performed by: EMERGENCY MEDICINE

## 2017-06-25 PROCEDURE — 99282 EMERGENCY DEPT VISIT SF MDM: CPT

## 2017-06-25 PROCEDURE — 36415 COLL VENOUS BLD VENIPUNCTURE: CPT | Performed by: EMERGENCY MEDICINE

## 2017-06-26 NOTE — ED NOTES
Attempts to obtain fetal heart tones via doppler made by 2 RNs without success. Spoke with Dr. Homer Ahumada and made her aware.

## 2017-06-26 NOTE — DISCHARGE INSTRUCTIONS

## 2017-06-26 NOTE — ED PROVIDER NOTES
HPI Comments: Gino Edge, 24 y.o. Female (, P:0, A:1), with PMHx of Crohn's disease, PCOS and placental abruption presents ambulatory to [facility] ED for evaluation of vaginal spotting that began today. Patient states that she is 15 weeks pregnant, and that she lost her first pregnancy in 2017 due to placental abruption. She notes that she has been seeing her OB regularly and that all her OB check-ups have been normal. She denies any current pregnancy complications. She describes the vaginal bleeding as intermittent spotting. She denies any abdominal pain or dysuria. PCP: None  OB/GYN: Dr Angelica Saunders MD    Social history significant for: - Tobacco (former, quit when she was informed of her current pregnancy), - EtOH, - Illicit Drug Use    There are no other complaints, changes, or physical findings at this time. The history is provided by the patient. No  was used. Past Medical History:   Diagnosis Date    Crohn's colitis ([address]     H/O  section     PCOS (polycystic ovarian syndrome)     Placental abruption        Past Surgical History:   Procedure Laterality Date    HX  SECTION      x 1         History reviewed. No pertinent family history. Social History     Social History    Marital status: SINGLE     Spouse name: N/A    Number of children: N/A    Years of education: N/A     Occupational History    Not on file. Social History Main Topics    Smoking status: Former Smoker    Smokeless tobacco: Never Used    Alcohol use No    Drug use: No    Sexual activity: Yes     Partners: Male     Birth control/ protection: None     Other Topics Concern    Not on file     Social History Narrative         ALLERGIES: Codeine    Review of Systems   Constitutional: Negative for fatigue and fever. HENT: Negative. Eyes: Negative. Respiratory: Negative for shortness of breath and wheezing.     Cardiovascular: Negative for chest pain and leg swelling. Gastrointestinal: Negative for abdominal pain, blood in stool, constipation, diarrhea, nausea and vomiting. Endocrine: Negative. Genitourinary: Positive for vaginal bleeding. Negative for difficulty urinating and dysuria. Musculoskeletal: Negative. Skin: Negative for rash. Allergic/Immunologic: Negative. Neurological: Negative for weakness and numbness. Hematological: Negative. Psychiatric/Behavioral: Negative. Patient Vitals for the past 12 hrs:   Temp Pulse Resp BP SpO2   17 2123 98 °F (36.7 °C) (!) 123 18 (!) 136/122 100 %       Physical Exam   Constitutional: She is oriented to person, place, and time. She appears well-developed and well-nourished. Tearful, anxious   HENT:   Head: Normocephalic and atraumatic. Mouth/Throat: Mucous membranes are normal.   Eyes: EOM are normal. Pupils are equal, round, and reactive to light. Neck: Normal range of motion. No JVD present. No tracheal deviation present. Cardiovascular: Regular rhythm, normal heart sounds and intact distal pulses. Tachycardia present. Exam reveals no gallop and no friction rub. No murmur heard. Pulmonary/Chest: Effort normal and breath sounds normal. No stridor. She has no wheezes. She has no rales. Abdominal: Soft. Bowel sounds are normal. She exhibits no distension and no mass. There is no tenderness. There is no guarding. Genitourinary:   Genitourinary Comments: Pelvic exam findings:  Cervical os is closed  No vaginal bleeding  + Scant vaginal discharge   Musculoskeletal: Normal range of motion. She exhibits no edema or tenderness. Neurological: She is alert and oriented to person, place, and time. Skin: Skin is warm and dry. No rash noted. Psychiatric: She has a normal mood and affect.  Her behavior is normal. Judgment and thought content normal.        MDM  Number of Diagnoses or Management Options  Threatened :   Diagnosis management comments: DDx: threatened , spontaneous , placenta previa       Amount and/or Complexity of Data Reviewed  Clinical lab tests: ordered and reviewed  Review and summarize past medical records: yes  Independent visualization of images, tracings, or specimens: yes    Patient Progress  Patient progress: stable    ED Course       Procedures    Procedure Note - Pelvic Exam:    9:40 PM  Performed by: Jeremiah Ramirez DO  Chaperoned by: Pt's partner  Pelvic exam was performed using bimanual and speculum. Further findings noted in physical exam.   The procedure took 1-15 minutes, and pt tolerated well. Written by Garrett Montalvo ED Scribe, as dictated by Jeremiah Ramirez DO. Progress Note:  11:37 PM  Updated pt and fiancee about US results. Will have her follow up with OBGYN. Rosmery León DO      LABORATORY TESTS:  Recent Results (from the past 12 hour(s))   CBC WITH AUTOMATED DIFF    Collection Time: 17  9:54 PM   Result Value Ref Range    WBC 10.2 3.6 - 11.0 K/uL    RBC 3.58 (L) 3.80 - 5.20 M/uL    HGB 9.3 (L) 11.5 - 16.0 g/dL    HCT 28.5 (L) 35.0 - 47.0 %    MCV 79.6 (L) 80.0 - 99.0 FL    MCH 26.0 26.0 - 34.0 PG    MCHC 32.6 30.0 - 36.5 g/dL    RDW 16.3 (H) 11.5 - 14.5 %    PLATELET 455 737 - 811 K/uL    NEUTROPHILS 75 32 - 75 %    LYMPHOCYTES 15 12 - 49 %    MONOCYTES 9 5 - 13 %    EOSINOPHILS 1 0 - 7 %    BASOPHILS 0 0 - 1 %    ABS. NEUTROPHILS 7.7 1.8 - 8.0 K/UL    ABS. LYMPHOCYTES 1.5 0.8 - 3.5 K/UL    ABS. MONOCYTES 0.9 0.0 - 1.0 K/UL    ABS. EOSINOPHILS 0.1 0.0 - 0.4 K/UL    ABS. BASOPHILS 0.0 0.0 - 0.1 K/UL   TOTAL HCG, QT. Collection Time: 17  9:54 PM   Result Value Ref [place] [address] (H) 0 - 6 MIU/ML   TYPE, ABO & RH    Collection Time: 17  9:54 PM   Result Value Ref Range    ABO/Rh(D) A NEGATIVE     Comment SAMPLE NOT USABLE FOR CROSSMATCH        IMAGING RESULTS:  US PREG UTS LTD   Final Result   EXAM: US OB EVAL SINGLE GESTATION LESS THAN 14 WEEKS     INDICATION: Vaginal bleeding. Pregnant.     COMPARISON: 2017     TECHNIQUE: Transabdominal ultrasound of the pelvis.     hCG: Not available      FINDINGS:    The uterus measures: 13.8 x 9.1 x 12.3 cm     The uterus contains a fetus in vertex position with measurements corresponding  with an estimated gestational age of 14 weeks 2 days. There is a fetal heart  rate of 163 bpm. Amniotic fluid is within normal limits. Cervix is within normal  limits measuring 3.8 cm. The placenta is posterior.     The right ovary is not visualized due to a gravid uterus and bowel gas. The left  ovary measures 4.1 x 1.6 x 3.2 cm with normal flow.     IMPRESSION  IMPRESSION: Single viable intrauterine pregnancy with estimated gestational age  of 14 weeks 2 days. Normal left ovary. Nonvisualized right ovary. MEDICATIONS GIVEN:  Medications - No data to display    IMPRESSION:  1. Threatened         PLAN:  1. Discharge    Follow-up Information     Follow up With Details Comments Contact Info    Your OBGYN Schedule an appointment as soon as possible for a visit in 1 day      \[facility]\"" EMERGENCY DEPT  As needed, If symptoms worsen [address] [place] [address]  [phone]        Return to ED if worse     Discharge Note:  11:43 PM  The pt is ready for discharge. The pt's signs, symptoms, diagnosis, and discharge instructions have been discussed and pt has conveyed their understanding. The pt is to follow up as recommended or return to ER should their symptoms worsen. Plan has been discussed and pt is in agreement. This note is prepared by Maynor Wade, acting as a Scribe for Edmond Blakely DO. Edmond Blakely DO: The scribe's documentation has been prepared under my direction and personally reviewed by me in its entirety. I confirm that the notes above accurately reflects all work, treatment, procedures, and medical decision making performed by me.

## 2017-06-29 ENCOUNTER — HOSPITAL ENCOUNTER (EMERGENCY)
Age: 22
Discharge: HOME | End: 2017-06-29
Attending: EMERGENCY MEDICINE
Payer: MEDICAID

## 2017-06-29 DIAGNOSIS — Z29.13 NEED FOR RHOGAM DUE TO RH NEGATIVE MOTHER: Primary | ICD-10-CM

## 2017-06-29 PROCEDURE — 74011250636 HC RX REV CODE- 250/636: Performed by: EMERGENCY MEDICINE

## 2017-06-29 PROCEDURE — 75810000275 HC EMERGENCY DEPT VISIT NO LEVEL OF CARE

## 2017-06-29 RX ADMIN — HUMAN RHO(D) IMMUNE GLOBULIN 0.3 MG: 300 INJECTION, SOLUTION INTRAMUSCULAR at 20:48

## 2017-06-29 NOTE — ED NOTES
Pt sent back to ED for Rhogam shot, but does not need evaluation by nursing or physician. Per Dr. Aly Neville pt does not need vitals or triage and just Rhogam administered.

## 2017-06-29 NOTE — ED NOTES
Called blood bank who is working on getting Rhogam from [facility]. They will call back up ETA.

## 2017-06-30 LAB
BLD PROD TYP BPU: NORMAL
BPU ID: NORMAL
GA (WEEKS): 18 WK
STATUS OF UNIT,%ST: NORMAL
UNIT DIVISION, %UDIV: 0

## 2017-06-30 NOTE — CALL BACK NOTE
Called patient back today to inform her that she would need to come back for a Rhogam shot. Also notified patient's OBGYN that patient did not receive her Rhogam shot during her visit. OBGYN states they will make a follow up appointment. Will give patient Rhogam shot here and have her follow up.  Lorenzo Eubanks DO

## 2017-06-30 NOTE — DISCHARGE INSTRUCTIONS
Learning About Rh Immunoglobulin Shots  Introduction  An Rh immunoglobulin shot is given to pregnant women who have Rh-negative blood. You may have Rh-negative blood, and your baby may have Rh-positive blood. If the two types of blood mix, your body will make antibodies. This is called Rh sensitization. Most of the time, this is not a problem the first time you're pregnant. But it could cause problems in future pregnancies. This shot keeps your body from making the antibodies. You get the shot around 28 weeks of pregnancy. After the birth, your baby's blood is tested. If the blood is Rh positive, you will get another shot. You may also get the shot if you have vaginal bleeding while you are pregnant or if you have a miscarriage. These shots protect future pregnancies. Women with Rh negative blood will need this shot each time they get pregnant. Example  · Rh immunoglobulin (HypRho-D, MICRhoGAM, and RhoGAM)  Possible side effects  Rare side effects may include:  · Some mild pain where you got the shot. · A slight fever. · An allergic reaction. You may have other side effects not listed here. Check the information that comes with your medicine. What to know about taking this medicine  · You may need more than one shot. You may need the shot again:  ¨ After amniocentesis, fetal blood sampling, or chorionic villus sampling tests. ¨ If you have bleeding in your second or third trimester. ¨ After turning of a breech baby. ¨ After an injury to the belly while you are pregnant. ¨ After a miscarriage or an . ¨ Before or right after treatment for an ectopic or a partial molar pregnancy. · Tell your doctor if you have any allergies or have had a bad response to medicines in the past.  · If you get this shot within 3 months of getting a live-virus vaccine, the vaccine may not work. Your doctor will tell you if you need more vaccine.   · Check with your doctor or pharmacist before you use any other medicines. This includes over-the-counter medicines. Make sure your doctor knows all of the medicines, vitamins, herbs, and supplements you take. Taking some medicines at the same time can cause problems. Where can you learn more? Go to http://giselle-tara.info/. Enter P064 in the search box to learn more about \"Learning About Rh Immunoglobulin Shots. \"  Current as of: March 16, 2017  Content Version: 11.3  © 1876-2204 [employer]. Care instructions adapted under license by [employer] (which disclaims liability or warranty for this information). If you have questions about a medical condition or this instruction, always ask your healthcare professional. [address] any warranty or liability for your use of this information.

## 2017-07-11 ENCOUNTER — APPOINTMENT (OUTPATIENT)
Dept: ULTRASOUND IMAGING | Age: 22
End: 2017-07-11
Attending: PHYSICIAN ASSISTANT
Payer: MEDICAID

## 2017-07-11 ENCOUNTER — HOSPITAL ENCOUNTER (EMERGENCY)
Age: 22
Discharge: HOME | End: 2017-07-11
Attending: EMERGENCY MEDICINE | Admitting: EMERGENCY MEDICINE
Payer: MEDICAID

## 2017-07-11 VITALS
BODY MASS INDEX: 23.75 KG/M2 | TEMPERATURE: 98.4 F | DIASTOLIC BLOOD PRESSURE: 58 MMHG | SYSTOLIC BLOOD PRESSURE: 114 MMHG | HEART RATE: 88 BPM | OXYGEN SATURATION: 100 % | HEIGHT: 60 IN | RESPIRATION RATE: 16 BRPM | WEIGHT: 121 LBS

## 2017-07-11 DIAGNOSIS — R11.2 NAUSEA AND VOMITING, INTRACTABILITY OF VOMITING NOT SPECIFIED, UNSPECIFIED VOMITING TYPE: Primary | ICD-10-CM

## 2017-07-11 LAB
ALBUMIN SERPL BCP-MCNC: 2.6 G/DL (ref 3.5–5)
ALBUMIN/GLOB SERPL: 0.6 {RATIO} (ref 1.1–2.2)
ALP SERPL-CCNC: 92 U/L (ref 45–117)
ALT SERPL-CCNC: 14 U/L (ref 12–78)
ANION GAP BLD CALC-SCNC: 11 MMOL/L (ref 5–15)
APPEARANCE UR: ABNORMAL
AST SERPL W P-5'-P-CCNC: 9 U/L (ref 15–37)
BACTERIA URNS QL MICRO: NEGATIVE /HPF
BASOPHILS # BLD AUTO: 0 K/UL (ref 0–0.1)
BASOPHILS # BLD: 0 % (ref 0–1)
BILIRUB SERPL-MCNC: 0.3 MG/DL (ref 0.2–1)
BILIRUB UR QL: NEGATIVE
BUN SERPL-MCNC: 6 MG/DL (ref 6–20)
BUN/CREAT SERPL: 11 (ref 12–20)
CALCIUM SERPL-MCNC: 8.2 MG/DL (ref 8.5–10.1)
CHLORIDE SERPL-SCNC: 102 MMOL/L (ref 97–108)
CO2 SERPL-SCNC: 23 MMOL/L (ref 21–32)
COLOR UR: ABNORMAL
CREAT SERPL-MCNC: 0.56 MG/DL (ref 0.55–1.02)
EOSINOPHIL # BLD: 0.3 K/UL (ref 0–0.4)
EOSINOPHIL NFR BLD: 3 % (ref 0–7)
EPITH CASTS URNS QL MICRO: ABNORMAL /LPF
ERYTHROCYTE [DISTWIDTH] IN BLOOD BY AUTOMATED COUNT: 15.3 % (ref 11.5–14.5)
GLOBULIN SER CALC-MCNC: 4.4 G/DL (ref 2–4)
GLUCOSE SERPL-MCNC: 85 MG/DL (ref 65–100)
GLUCOSE UR STRIP.AUTO-MCNC: NEGATIVE MG/DL
HCG SERPL-ACNC: ABNORMAL MIU/ML (ref 0–6)
HCT VFR BLD AUTO: 27 % (ref 35–47)
HGB BLD-MCNC: 8.4 G/DL (ref 11.5–16)
HGB UR QL STRIP: NEGATIVE
HYALINE CASTS URNS QL MICRO: ABNORMAL /LPF (ref 0–5)
KETONES UR QL STRIP.AUTO: NEGATIVE MG/DL
LEUKOCYTE ESTERASE UR QL STRIP.AUTO: NEGATIVE
LYMPHOCYTES # BLD AUTO: 13 % (ref 12–49)
LYMPHOCYTES # BLD: 1.2 K/UL (ref 0.8–3.5)
MCH RBC QN AUTO: 24.8 PG (ref 26–34)
MCHC RBC AUTO-ENTMCNC: 31.1 G/DL (ref 30–36.5)
MCV RBC AUTO: 79.6 FL (ref 80–99)
MONOCYTES # BLD: 1.2 K/UL (ref 0–1)
MONOCYTES NFR BLD AUTO: 13 % (ref 5–13)
NEUTS SEG # BLD: 7 K/UL (ref 1.8–8)
NEUTS SEG NFR BLD AUTO: 71 % (ref 32–75)
NITRITE UR QL STRIP.AUTO: NEGATIVE
PH UR STRIP: 8 [PH] (ref 5–8)
PLATELET # BLD AUTO: 380 K/UL (ref 150–400)
POTASSIUM SERPL-SCNC: 3.4 MMOL/L (ref 3.5–5.1)
PROT SERPL-MCNC: 7 G/DL (ref 6.4–8.2)
PROT UR STRIP-MCNC: 30 MG/DL
RBC # BLD AUTO: 3.39 M/UL (ref 3.8–5.2)
RBC #/AREA URNS HPF: ABNORMAL /HPF (ref 0–5)
SODIUM SERPL-SCNC: 136 MMOL/L (ref 136–145)
SP GR UR REFRACTOMETRY: 1.02 (ref 1–1.03)
UROBILINOGEN UR QL STRIP.AUTO: 1 EU/DL (ref 0.2–1)
WBC # BLD AUTO: 9.7 K/UL (ref 3.6–11)
WBC URNS QL MICRO: ABNORMAL /HPF (ref 0–4)

## 2017-07-11 PROCEDURE — 74011250636 HC RX REV CODE- 250/636: Performed by: PHYSICIAN ASSISTANT

## 2017-07-11 PROCEDURE — 81001 URINALYSIS AUTO W/SCOPE: CPT | Performed by: EMERGENCY MEDICINE

## 2017-07-11 PROCEDURE — 76815 OB US LIMITED FETUS(S): CPT

## 2017-07-11 PROCEDURE — 96361 HYDRATE IV INFUSION ADD-ON: CPT

## 2017-07-11 PROCEDURE — 36415 COLL VENOUS BLD VENIPUNCTURE: CPT | Performed by: EMERGENCY MEDICINE

## 2017-07-11 PROCEDURE — 80053 COMPREHEN METABOLIC PANEL: CPT | Performed by: EMERGENCY MEDICINE

## 2017-07-11 PROCEDURE — 85025 COMPLETE CBC W/AUTO DIFF WBC: CPT | Performed by: EMERGENCY MEDICINE

## 2017-07-11 PROCEDURE — 84702 CHORIONIC GONADOTROPIN TEST: CPT | Performed by: EMERGENCY MEDICINE

## 2017-07-11 PROCEDURE — 99284 EMERGENCY DEPT VISIT MOD MDM: CPT

## 2017-07-11 PROCEDURE — 96374 THER/PROPH/DIAG INJ IV PUSH: CPT

## 2017-07-11 RX ORDER — ONDANSETRON 4 MG/1
4 TABLET, ORALLY DISINTEGRATING ORAL
Qty: 10 TAB | Refills: 0 | Status: SHIPPED | OUTPATIENT
Start: 2017-07-11 | End: 2018-07-25

## 2017-07-11 RX ORDER — PROMETHAZINE HYDROCHLORIDE 25 MG/1
25 TABLET ORAL
Qty: 12 TAB | Refills: 0 | Status: ON HOLD | OUTPATIENT
Start: 2017-07-11 | End: 2017-09-24

## 2017-07-11 RX ORDER — LOPERAMIDE HYDROCHLORIDE 2 MG/1
2 CAPSULE ORAL
COMMUNITY
End: 2017-07-20

## 2017-07-11 RX ORDER — ONDANSETRON 2 MG/ML
4 INJECTION INTRAMUSCULAR; INTRAVENOUS
Status: COMPLETED | OUTPATIENT
Start: 2017-07-11 | End: 2017-07-11

## 2017-07-11 RX ADMIN — SODIUM CHLORIDE 1000 ML: 900 INJECTION, SOLUTION INTRAVENOUS at 10:59

## 2017-07-11 RX ADMIN — ONDANSETRON 4 MG: 2 INJECTION INTRAMUSCULAR; INTRAVENOUS at 10:59

## 2017-07-11 NOTE — ED PROVIDER NOTES
HPI Comments: 24 y.o. female ( A1) with past medical history significant for crohn's disease, PCOS, and placental abruption who presents ambulatory from home accompanied by her mother with chief complaint of vomiting. Pt is 17 weeks pregnant with 4 days of severe nausea and vomiting. Pt states she has been unable to tolerate even sips of water. Pt states she has been having mild pelvic pain that she does not think is abnormal. Pt also reports associated fatigue. Of note, pt has been experiencing intermittent episodes of dizziness and headache. Pt has been taking Zofran, phenergan and diclegis with no relief. Pt received Rhogam 3 weeks ago after being admitted to the hospital with a threatened . Pt states she has had previous complications with her first pregnancy with HG and her potassium and iron levels. Further, at 31 weeks, she experienced a placental abruption requiring hospitalization and Rhogam. Of note, pt is currently taking Imodium to control a flare in her Crohn's disease that she states is normal. Pt denies recent travel. Pt specifically denies vaginal discharge, vaginal bleeding, chest pain, SOB, changes in bowel movements, leg swelling, leg cramping, and hemoptysis. There are no other acute medical concerns at this time. Social hx: former tobacco smoker; denies EtOH use; denies illicit drug use  PCP: None  OBGYN: Wesley Swanson MD    Note written by Al Rodriguez, as dictated by Suzette Lorenzana PA-C 10:33 AM            The history is provided by the patient. Past Medical History:   Diagnosis Date    [facility]     H/O  section     PCOS (polycystic ovarian syndrome)     Placental abruption        Past Surgical History:   Procedure Laterality Date    HX  SECTION      x 1         No family history on file.     Social History     Social History    Marital status: SINGLE     Spouse name: N/A    Number of children: N/A    Years of education: N/A     Occupational History    Not on file. Social History Main Topics    Smoking status: Former Smoker    Smokeless tobacco: Never Used    Alcohol use No    Drug use: No    Sexual activity: Yes     Partners: Male     Birth control/ protection: None     Other Topics Concern    Not on file     Social History Narrative         ALLERGIES: Codeine    Review of Systems   Constitutional: Positive for fatigue. Negative for chills, diaphoresis and fever. HENT: Negative for congestion, postnasal drip, rhinorrhea and sore throat. Eyes: Negative for photophobia, discharge, redness and visual disturbance. Respiratory: Negative for cough, chest tightness, shortness of breath and wheezing. Cardiovascular: Negative for chest pain, palpitations and leg swelling. Gastrointestinal: Positive for nausea and vomiting. Negative for abdominal distention, abdominal pain, blood in stool, constipation and diarrhea. Genitourinary: Negative for difficulty urinating, dysuria, frequency, hematuria, pelvic pain, urgency, vaginal bleeding and vaginal discharge. Musculoskeletal: Negative for arthralgias, back pain, joint swelling and myalgias. Skin: Negative for color change and rash. Neurological: Positive for dizziness and headaches. Negative for speech difficulty, weakness, light-headedness and numbness. Psychiatric/Behavioral: Negative for confusion. The patient is not nervous/anxious. All other systems reviewed and are negative. Vitals:    07/11/17 0940   BP: 107/69   Pulse: 86   Resp: 18   Temp: 97.2 °F (36.2 °C)   SpO2: 100%   Weight: 54.9 kg (121 lb)   Height: 5' (1.524 m)            Physical Exam   Constitutional: She is oriented to person, place, and time. She appears well-developed and well-nourished. HENT:   Head: Normocephalic and atraumatic. Eyes: Conjunctivae are normal. Pupils are equal, round, and reactive to light. Right eye exhibits no discharge. Left eye exhibits no discharge. Neck: Normal range of motion. Neck supple. No thyromegaly present. Cardiovascular: Normal rate, regular rhythm and normal heart sounds. Exam reveals no gallop and no friction rub. No murmur heard. Pulmonary/Chest: Effort normal and breath sounds normal. No respiratory distress. She has no wheezes. Abdominal: Soft. Bowel sounds are normal. She exhibits no distension. There is no tenderness. There is no rebound and no guarding. Musculoskeletal: Normal range of motion. Neurological: She is alert and oriented to person, place, and time. Skin: Skin is warm. Psychiatric: She has a normal mood and affect. MDM  Number of Diagnoses or Management Options  Nausea and vomiting, intractability of vomiting not specified, unspecified vomiting type:   Diagnosis management comments: Pt presents with nausea and vomiting at 17 weeks. HcG decreased to 12k. No vaginal bleeding or discharge. No pelvic or abdominal pain on exam.  Spoke to pt OBGYN (Dr. Rodrigo Osuna) regarding pt presentation and lab results. She advised refilling pt rx for nausea medicines and close follow up with her. Reviewed treatment plan with attending and they agree. Albina Zhao PA-C      ED Course       Procedures    CONSULT NOTE:  12:33 PM Christopher Starks PA-C spoke with Dr. Rodrigo Osuna, Consult for OBGYN. Discussed available diagnostic tests and clinical findings. She is in agreement with care plans as outlined. Dr. Rodrigo Osuna recommends discharging patient with instruction to continue taking prescribed iron supplements and f/u with her out-patient. Pt has a history of low hgb with non-compliance. Hcg level is expected at this point. Hcg peaks at 10-weeks and decreases later.

## 2017-07-11 NOTE — DISCHARGE INSTRUCTIONS
Nausea and Vomiting: Care Instructions  Your Care Instructions    When you are nauseated, you may feel weak and sweaty and notice a lot of saliva in your mouth. Nausea often leads to vomiting. Most of the time you do not need to worry about nausea and vomiting, but they can be signs of other illnesses. Two common causes of nausea and vomiting are stomach flu and food poisoning. Nausea and vomiting from viral stomach flu will usually start to improve within 24 hours. Nausea and vomiting from food poisoning may last from 12 to 48 hours. The doctor has checked you carefully, but problems can develop later. If you notice any problems or new symptoms, get medical treatment right away. Follow-up care is a key part of your treatment and safety. Be sure to make and go to all appointments, and call your doctor if you are having problems. It's also a good idea to know your test results and keep a list of the medicines you take. How can you care for yourself at home? · To prevent dehydration, drink plenty of fluids, enough so that your urine is light yellow or clear like water. Choose water and other caffeine-free clear liquids until you feel better. If you have kidney, heart, or liver disease and have to limit fluids, talk with your doctor before you increase the amount of fluids you drink. · Rest in bed until you feel better. · When you are able to eat, try clear soups, mild foods, and liquids until all symptoms are gone for 12 to 48 hours. Other good choices include dry toast, crackers, cooked cereal, and gelatin dessert, such as Jell-O. When should you call for help? Call 911 anytime you think you may need emergency care. For example, call if:  · You passed out (lost consciousness). Call your doctor now or seek immediate medical care if:  · You have symptoms of dehydration, such as:  ¨ Dry eyes and a dry mouth. ¨ Passing only a little dark urine.   ¨ Feeling thirstier than usual.  · You have new or worsening belly pain. · You have a new or higher fever. · You vomit blood or what looks like coffee grounds. Watch closely for changes in your health, and be sure to contact your doctor if:  · You have ongoing nausea and vomiting. · Your vomiting is getting worse. · Your vomiting lasts longer than 2 days. · You are not getting better as expected. Where can you learn more? Go to http://giselle-tara.info/. Enter 25 [id number] in the search box to learn more about \"Nausea and Vomiting: Care Instructions. \"  Current as of: March 20, 2017  Content Version: 11.3  © 1721-8860 [employer]. Care instructions adapted under license by [employer] (which disclaims liability or warranty for this information). If you have questions about a medical condition or this instruction, always ask your healthcare professional. [address] any warranty or liability for your use of this information. We hope that we have addressed all of your medical concerns. The examination and treatment you received in the Emergency Department were for an emergent problem and were not intended as complete care. It is important that you follow up with your healthcare provider(s) for ongoing care. If your symptoms worsen or do not improve as expected, and you are unable to reach your usual health care provider(s), you should return to the Emergency Department. Today's healthcare is undergoing tremendous change, and patient satisfaction surveys are one of the many tools to assess the quality of medical care. You may receive a survey from the [employer] organization regarding your experience in the Emergency Department. I hope that your experience has been completely positive, particularly the medical care that I provided. As such, please participate in the survey; anything less than excellent does not meet my expectations or intentions.         [address] [address] [facility] participate in nationally recognized quality of care measures. If your blood pressure is greater than 120/80, as reported below, we urge that you seek medical care to address the potential of high blood pressure, commonly known as hypertension. Hypertension can be hereditary or can be caused by certain medical conditions, pain, stress, or \"white coat syndrome. \"       Please make an appointment with your health care provider(s) for follow up of your Emergency Department visit. VITALS:   Patient Vitals for the past 8 hrs:   Temp Pulse Resp BP SpO2   07/11/17 1100 - - - 112/59 100 %   07/11/17 1015 - - - 124/66 -   07/11/17 0940 97.2 °F (36.2 °C) 86 18 107/69 100 %          Thank you for allowing us to provide you with medical care today. We realize that you have many choices for your emergency care needs. Please choose us in the future for any continued health care needs. Kurtis Doss  [address]   Office: [phone]            Recent Results (from the past 24 hour(s))   URINALYSIS W/MICROSCOPIC    Collection Time: 07/11/17 10:27 AM   Result Value Ref Range    Color YELLOW/STRAW      Appearance CLOUDY (A) CLEAR      Specific gravity 1.024 1.003 - 1.030      pH (UA) 8.0 5.0 - 8.0      Protein 30 (A) NEG mg/dL    Glucose NEGATIVE  NEG mg/dL    Ketone NEGATIVE  NEG mg/dL    Bilirubin NEGATIVE  NEG      Blood NEGATIVE  NEG      Urobilinogen 1.0 0.2 - 1.0 EU/dL    Nitrites NEGATIVE  NEG      Leukocyte Esterase NEGATIVE  NEG      WBC 0-4 0 - 4 /hpf    RBC 0-5 0 - 5 /hpf    Epithelial cells MANY (A) FEW /lpf    Bacteria NEGATIVE  NEG /hpf    Hyaline cast 0-2 0 - 5 /lpf   CBC WITH AUTOMATED DIFF    Collection Time: 07/11/17 10:28 AM   Result Value Ref Range    WBC 9.7 3.6 - 11.0 K/uL    RBC 3.39 (L) 3.80 - 5.20 M/uL    HGB 8.4 (L) 11.5 - 16.0 g/dL    HCT 27.0 (L) 35.0 - 47.0 %    MCV 79.6 (L) 80.0 - 99.0 FL    MCH 24.8 (L) 26.0 - 34.0 PG    MCHC 31.1 30.0 - 36.5 g/dL    RDW 15.3 (H) 11.5 - 14.5 %    PLATELET 622 913 - 153 K/uL    NEUTROPHILS 71 32 - 75 %    LYMPHOCYTES 13 12 - 49 %    MONOCYTES 13 5 - 13 %    EOSINOPHILS 3 0 - 7 %    BASOPHILS 0 0 - 1 %    ABS. NEUTROPHILS 7.0 1.8 - 8.0 K/UL    ABS. LYMPHOCYTES 1.2 0.8 - 3.5 K/UL    ABS. MONOCYTES 1.2 (H) 0.0 - 1.0 K/UL    ABS. EOSINOPHILS 0.3 0.0 - 0.4 K/UL    ABS. BASOPHILS 0.0 0.0 - 0.1 K/UL   METABOLIC PANEL, COMPREHENSIVE    Collection Time: 17 10:28 AM   Result Value Ref Range    Sodium 136 136 - 145 mmol/L    Potassium 3.4 (L) 3.5 - 5.1 mmol/L    Chloride 102 97 - 108 mmol/L    CO2 23 21 - 32 mmol/L    Anion gap 11 5 - 15 mmol/L    Glucose 85 65 - 100 mg/dL    BUN 6 6 - 20 MG/DL    Creatinine 0.56 0.55 - 1.02 MG/DL    BUN/Creatinine ratio 11 (L) 12 - 20      GFR est AA >60 >60 ml/min/1.73m2    GFR est non-AA >60 >60 ml/min/1.73m2    Calcium 8.2 (L) 8.5 - 10.1 MG/DL    Bilirubin, total 0.3 0.2 - 1.0 MG/DL    ALT (SGPT) 14 12 - 78 U/L    AST (SGOT) 9 (L) 15 - 37 U/L    Alk. phosphatase 92 45 - 117 U/L    Protein, total 7.0 6.4 - 8.2 g/dL    Albumin 2.6 (L) 3.5 - 5.0 g/dL    Globulin 4.4 (H) 2.0 - 4.0 g/dL    A-G Ratio 0.6 (L) 1.1 - 2.2     TOTAL HCG, QT. Collection Time: 17 10:28 AM   Result Value Ref Range    [place] [address] (H) 0 - 6 MIU/ML       [employer]    Result Date: 2017  EXAM:  EllieS LTD INDICATION:  Hyperemesis and dehydration during second trimester pregnancy. No vaginal bleeding or vaginal discharge. LMP on 3/15/2017. . COMPARISON: None. TECHNIQUE: Transabdominal pelvic ultrasound/Limited second trimester obstetric ultrasound. FINDINGS: Uterus contains a single intrauterine pregnancy. Placenta is posterior. Amniotic fluid volume is within normal limits. Fetal position is breech. Fetal cardiac activity measures 159 bpm. Crown-rump length measures 11.65 cm. BPD is 3.76 cm. Head circumference is 13.51 cm.  Abdominal circumference is 11.49 cm. Femur length is 2.18 cm. There is evidence of bilateral fetal hydronephrosis. Right ovary is obscured by the uterus. Left ovary measures 3.0 x 1.0 x 1.5 cm and contains blood flow. No evidence of adnexal mass. IMPRESSION: 1. Single intrauterine pregnancy with estimated gestational age of 12 weeks and 1 day. 2. Bilateral fetal hydronephrosis. Recommendation: Nonemergent second trimester screening obstetric ultrasound.

## 2017-07-11 NOTE — ED NOTES
Pt given discharge instructions and prescriptions, verbalized understanding.  Pt ambulatory out of ER with steady gait with grandmother who is driving pt home

## 2017-07-11 NOTE — ED TRIAGE NOTES
Patient reports she is 17 weeks pregnant. She states she has been vomiting non stop for 4 days. Prior admission for hyperemesis. Patient is pale in triage. Denies vaginal bleeding and discharge.

## 2017-07-14 ENCOUNTER — APPOINTMENT (OUTPATIENT)
Dept: ULTRASOUND IMAGING | Age: 22
DRG: 566 | End: 2017-07-14
Attending: NURSE PRACTITIONER
Payer: MEDICAID

## 2017-07-14 ENCOUNTER — HOSPITAL ENCOUNTER (INPATIENT)
Age: 22
LOS: 6 days | Discharge: HOME | DRG: 566 | End: 2017-07-20
Attending: EMERGENCY MEDICINE | Admitting: OBSTETRICS & GYNECOLOGY
Payer: MEDICAID

## 2017-07-14 DIAGNOSIS — R10.84 ABDOMINAL PAIN, GENERALIZED: Primary | ICD-10-CM

## 2017-07-14 DIAGNOSIS — K50.919 CROHN'S DISEASE WITH COMPLICATION, UNSPECIFIED GASTROINTESTINAL TRACT LOCATION (HCC): ICD-10-CM

## 2017-07-14 DIAGNOSIS — O21.9 NAUSEA AND VOMITING IN PREGNANCY: ICD-10-CM

## 2017-07-14 PROBLEM — K50.90 CROHN'S DISEASE (HCC): Status: ACTIVE | Noted: 2017-07-14

## 2017-07-14 PROBLEM — O21.0 HYPEREMESIS ARISING DURING PREGNANCY: Status: ACTIVE | Noted: 2017-07-14

## 2017-07-14 PROBLEM — Z98.891 PREVIOUS CESAREAN SECTION: Status: ACTIVE | Noted: 2017-07-14

## 2017-07-14 LAB
ALBUMIN SERPL BCP-MCNC: 2.2 G/DL (ref 3.5–5)
ALBUMIN/GLOB SERPL: 0.6 {RATIO} (ref 1.1–2.2)
ALP SERPL-CCNC: 113 U/L (ref 45–117)
ALT SERPL-CCNC: 13 U/L (ref 12–78)
AMYLASE SERPL-CCNC: 14 U/L (ref 25–115)
ANION GAP BLD CALC-SCNC: 9 MMOL/L (ref 5–15)
APPEARANCE UR: CLEAR
AST SERPL W P-5'-P-CCNC: 6 U/L (ref 15–37)
BACTERIA URNS QL MICRO: NEGATIVE /HPF
BASOPHILS # BLD AUTO: 0 K/UL (ref 0–0.1)
BASOPHILS # BLD: 0 % (ref 0–1)
BILIRUB SERPL-MCNC: 0.2 MG/DL (ref 0.2–1)
BILIRUB UR QL: NEGATIVE
BUN SERPL-MCNC: 2 MG/DL (ref 6–20)
BUN/CREAT SERPL: 3 (ref 12–20)
CALCIUM SERPL-MCNC: 7.9 MG/DL (ref 8.5–10.1)
CAOX CRY URNS QL MICRO: ABNORMAL
CHLORIDE SERPL-SCNC: 103 MMOL/L (ref 97–108)
CO2 SERPL-SCNC: 25 MMOL/L (ref 21–32)
COLOR UR: ABNORMAL
CREAT SERPL-MCNC: 0.59 MG/DL (ref 0.55–1.02)
CRP SERPL-MCNC: 10.7 MG/DL (ref 0–0.6)
EOSINOPHIL # BLD: 0.6 K/UL (ref 0–0.4)
EOSINOPHIL NFR BLD: 5 % (ref 0–7)
EPITH CASTS URNS QL MICRO: ABNORMAL /LPF
ERYTHROCYTE [DISTWIDTH] IN BLOOD BY AUTOMATED COUNT: 15.3 % (ref 11.5–14.5)
GLOBULIN SER CALC-MCNC: 3.8 G/DL (ref 2–4)
GLUCOSE SERPL-MCNC: 150 MG/DL (ref 65–100)
GLUCOSE UR STRIP.AUTO-MCNC: NEGATIVE MG/DL
HCG SERPL-ACNC: ABNORMAL MIU/ML (ref 0–6)
HCT VFR BLD AUTO: 28.2 % (ref 35–47)
HGB BLD-MCNC: 8.9 G/DL (ref 11.5–16)
HGB UR QL STRIP: NEGATIVE
KETONES UR QL STRIP.AUTO: ABNORMAL MG/DL
LEUKOCYTE ESTERASE UR QL STRIP.AUTO: NEGATIVE
LIPASE SERPL-CCNC: 33 U/L (ref 73–393)
LYMPHOCYTES # BLD AUTO: 12 % (ref 12–49)
LYMPHOCYTES # BLD: 1.5 K/UL (ref 0.8–3.5)
MCH RBC QN AUTO: 25.1 PG (ref 26–34)
MCHC RBC AUTO-ENTMCNC: 31.6 G/DL (ref 30–36.5)
MCV RBC AUTO: 79.7 FL (ref 80–99)
MONOCYTES # BLD: 1.4 K/UL (ref 0–1)
MONOCYTES NFR BLD AUTO: 11 % (ref 5–13)
MUCOUS THREADS URNS QL MICRO: ABNORMAL /LPF
NEUTS SEG # BLD: 8.6 K/UL (ref 1.8–8)
NEUTS SEG NFR BLD AUTO: 72 % (ref 32–75)
NITRITE UR QL STRIP.AUTO: NEGATIVE
PH UR STRIP: 6 [PH] (ref 5–8)
PLATELET # BLD AUTO: 465 K/UL (ref 150–400)
POTASSIUM SERPL-SCNC: 3.4 MMOL/L (ref 3.5–5.1)
PROT SERPL-MCNC: 6 G/DL (ref 6.4–8.2)
PROT UR STRIP-MCNC: 30 MG/DL
RBC # BLD AUTO: 3.54 M/UL (ref 3.8–5.2)
RBC #/AREA URNS HPF: ABNORMAL /HPF (ref 0–5)
SODIUM SERPL-SCNC: 137 MMOL/L (ref 136–145)
SP GR UR REFRACTOMETRY: 1.02 (ref 1–1.03)
UROBILINOGEN UR QL STRIP.AUTO: 0.2 EU/DL (ref 0.2–1)
WBC # BLD AUTO: 12 K/UL (ref 3.6–11)
WBC URNS QL MICRO: ABNORMAL /HPF (ref 0–4)

## 2017-07-14 PROCEDURE — 96361 HYDRATE IV INFUSION ADD-ON: CPT

## 2017-07-14 PROCEDURE — 99285 EMERGENCY DEPT VISIT HI MDM: CPT

## 2017-07-14 PROCEDURE — 96374 THER/PROPH/DIAG INJ IV PUSH: CPT

## 2017-07-14 PROCEDURE — 81001 URINALYSIS AUTO W/SCOPE: CPT | Performed by: NURSE PRACTITIONER

## 2017-07-14 PROCEDURE — 76705 ECHO EXAM OF ABDOMEN: CPT

## 2017-07-14 PROCEDURE — 80053 COMPREHEN METABOLIC PANEL: CPT | Performed by: NURSE PRACTITIONER

## 2017-07-14 PROCEDURE — 76770 US EXAM ABDO BACK WALL COMP: CPT

## 2017-07-14 PROCEDURE — 86140 C-REACTIVE PROTEIN: CPT | Performed by: EMERGENCY MEDICINE

## 2017-07-14 PROCEDURE — 85025 COMPLETE CBC W/AUTO DIFF WBC: CPT | Performed by: NURSE PRACTITIONER

## 2017-07-14 PROCEDURE — 74011250636 HC RX REV CODE- 250/636: Performed by: NURSE PRACTITIONER

## 2017-07-14 PROCEDURE — C9113 INJ PANTOPRAZOLE SODIUM, VIA: HCPCS | Performed by: OBSTETRICS & GYNECOLOGY

## 2017-07-14 PROCEDURE — 84702 CHORIONIC GONADOTROPIN TEST: CPT | Performed by: NURSE PRACTITIONER

## 2017-07-14 PROCEDURE — 82150 ASSAY OF AMYLASE: CPT | Performed by: EMERGENCY MEDICINE

## 2017-07-14 PROCEDURE — 76815 OB US LIMITED FETUS(S): CPT

## 2017-07-14 PROCEDURE — 65410000002 HC RM PRIVATE OB

## 2017-07-14 PROCEDURE — 74011250636 HC RX REV CODE- 250/636: Performed by: OBSTETRICS & GYNECOLOGY

## 2017-07-14 PROCEDURE — 83690 ASSAY OF LIPASE: CPT | Performed by: EMERGENCY MEDICINE

## 2017-07-14 PROCEDURE — 74011000250 HC RX REV CODE- 250: Performed by: OBSTETRICS & GYNECOLOGY

## 2017-07-14 RX ORDER — SODIUM CHLORIDE 0.9 % (FLUSH) 0.9 %
SYRINGE (ML) INJECTION
Status: COMPLETED
Start: 2017-07-14 | End: 2017-07-15

## 2017-07-14 RX ORDER — SODIUM CHLORIDE 0.9 % (FLUSH) 0.9 %
SYRINGE (ML) INJECTION
Status: DISCONTINUED
Start: 2017-07-14 | End: 2017-07-15

## 2017-07-14 RX ORDER — ACETAMINOPHEN 325 MG/1
650 TABLET ORAL
Status: DISCONTINUED | OUTPATIENT
Start: 2017-07-14 | End: 2017-07-16

## 2017-07-14 RX ORDER — ONDANSETRON 2 MG/ML
4 INJECTION INTRAMUSCULAR; INTRAVENOUS
Status: COMPLETED | OUTPATIENT
Start: 2017-07-14 | End: 2017-07-14

## 2017-07-14 RX ORDER — SWAB
1 SWAB, NON-MEDICATED MISCELLANEOUS DAILY
Status: DISCONTINUED | OUTPATIENT
Start: 2017-07-15 | End: 2017-07-20

## 2017-07-14 RX ORDER — SODIUM CHLORIDE 9 MG/ML
1000 INJECTION, SOLUTION INTRAVENOUS
Status: COMPLETED | OUTPATIENT
Start: 2017-07-14 | End: 2017-07-14

## 2017-07-14 RX ORDER — ONDANSETRON 2 MG/ML
4 INJECTION INTRAMUSCULAR; INTRAVENOUS
Status: DISCONTINUED | OUTPATIENT
Start: 2017-07-14 | End: 2017-07-20 | Stop reason: HOSPADM

## 2017-07-14 RX ORDER — DEXTROSE MONOHYDRATE AND SODIUM CHLORIDE 5; .9 G/100ML; G/100ML
125 INJECTION, SOLUTION INTRAVENOUS CONTINUOUS
Status: DISCONTINUED | OUTPATIENT
Start: 2017-07-14 | End: 2017-07-16

## 2017-07-14 RX ADMIN — ONDANSETRON 4 MG: 2 INJECTION INTRAMUSCULAR; INTRAVENOUS at 14:22

## 2017-07-14 RX ADMIN — SODIUM CHLORIDE 1000 ML: 900 INJECTION, SOLUTION INTRAVENOUS at 14:22

## 2017-07-14 RX ADMIN — SODIUM CHLORIDE 40 MG: 9 INJECTION INTRAMUSCULAR; INTRAVENOUS; SUBCUTANEOUS at 21:54

## 2017-07-14 RX ADMIN — ONDANSETRON 4 MG: 2 INJECTION INTRAMUSCULAR; INTRAVENOUS at 21:54

## 2017-07-14 NOTE — PROGRESS NOTES
Admission Medication Reconciliation:    Information obtained from: Patient/rx query    Significant PMH/Disease States:   Past Medical History:   Diagnosis Date    Crohn's colitis ([address]     H/O  section     PCOS (polycystic ovarian syndrome)     Placental abruption        Chief Complaint for this Admission:  Pregnancy problem    Allergies:  Codeine    Prior to Admission Medications:   Prior to Admission Medications   Prescriptions Last Dose Informant Patient Reported? Taking? DOXYLAMINE SUCCINATE/VIT B6 (DICLEGIS PO) 2017 at Unknown time  Yes Yes   Sig: Take 1 Tab by mouth daily. loperamide (IMODIUM) 2 mg capsule 2017  Yes No   Sig: Take 2 mg by mouth four (4) times daily as needed for Diarrhea. ondansetron (ZOFRAN ODT) 4 mg disintegrating tablet 2017 at Unknown time  No Yes   Sig: Take 1 Tab by mouth every eight (8) hours as needed for Nausea. promethazine (PHENERGAN) 25 mg tablet 2017 at Unknown time  No Yes   Sig: Take 1 Tab by mouth every six (6) hours as needed. Facility-Administered Medications: None         Comments/Recommendations: Completed Prior to Admission Medications according to patient report.      Raulito Gorman, MaryD

## 2017-07-14 NOTE — H&P
History & Physical    Name: Natasha Thao MRN: [id number]  SSN: [id number]    YOB: 1995  Age: 24 y.o. Sex: female      Subjective:     Reason for Admission:  Nausea/Vomiting/Bloody diarrhea and abdominal pain @ 17 wks EGA    History of Present Illness: Natasha Thao is a 24 y.o.   female with an estimated gestational age of 15w4d with Estimated Date of Delivery: 17. Patient normally sees Dr. Rose Yang for her obstetric care but wishes to receive her care here @ [facility]. She gives a history of several weeks to months of nausea/vomiting. Additionally, she has had diarrhea for 2 weeks but bloody diarrhea x 2-3 days. Of note, the patient does have a h/o Crohn's disease and was admitted at 8-9 wks with bloody diarrhea and a Crohn's flare. She was treated with antibiotics but declined being on any standing Crohn's maintenance medication (worried about medication in pregnancy since losing their son last year). She reports that in addition to the bloody diarrhea, the primary thing that prompted an ER visit is the abdominal pain. She reports epigastric pain that comes in waves and is described as sharp/stabbing with radiation down into her pelvis and legs. Her usual Crohn's pain feels more like \"hot lava\" inside. She has been taking Tylenol and Imodium only but stopped the imodium as cramps/abdominal pain is listed as a potential side effect. She doesn't think the Tylenol has helped that much with her pain. She has not had any fevers at home but does state that she has had chills off and on. She does have hyperemesis as well with this pregnancy but feels that is manageable with the exception of the abdominal pain. She reports a total of 6# weight loss this pregnancy. She has been on a \"soup diet\" for days but states even liquids aren't staying down. She just ate a taco in the ER right before my arrival.    Crohn's was diagnosed in  with biopsies.   She was seeing  Haverty at that time but has essentially not followed up with him at all. It sounds like she has not been on any Crohn's medication since her 1st pregnancy. ROS is negative for fevers, sick contacts, travel, chest pain, SOB, VB, contractions, LOF, changes in vaginal discharge. She does report occ HA, nausea, vomiting, diarrhea, abdominal pain (all as noted above). She reports positive FM intermittently. G1 c/b placental abruption @ 32 wks  delivery of Kumar Golden); 2016  G2 current    OB US FINDINGS: Single intrauterine pregnancy is still viable. Measurements are not  significantly changed. Mild bilateral fetal hydronephrosis is unchanged. Amniotic fluid volume is within normal limits. Cervix is closed. Placenta is  posterior. There is fetal movement. Fetal position is variable. Fetal cardiac  activity measures 150 bpm.      Maternal ovaries are secured by the gravid uterus. No ascites. No evidence of  pelvic mass.     IMPRESSION:      Single viable intrauterine pregnancy is unchanged. Mild bilateral fetal  hydronephrosis is unchanged. Recommendation: Nonemergent second trimester screening obstetric ultrasound. Limited ABD US FINDINGS:      Liver: echogenicity is within normal limits. No focal liver lesion.      Main portal vein flow: Toward the liver.     Fluid: No ascites.     Gallbladder: Contracted. No shadowing gallstone or pericholecystic fluid. Negative sonographic Dykes sign.      Bile ducts: There is no intra or extrahepatic biliary ductal dilatation. The  common bile duct measures 5 mm.     Pancreas: The visualized portions are within normal limits.      Kidneys: Right length: 10.7 cm. No hydronephrosis.      Mural thickening of the ascending colon may be a true finding. No fluid  collection or abscess in the right paracolic gutter.     IMPRESSION:   1. Evidence of ascending colitis. No abscess. 2. Contracted gallbladder. No evidence of acute cholecystitis.  No abscess. OB History      Para Term  AB Living    1         SAB TAB Ectopic Molar Multiple Live Births                 Past Medical History:   Diagnosis Date    Crohn's colitis ([address]     H/O  section     PCOS (polycystic ovarian syndrome)     Placental abruption      Past Surgical History:   Procedure Laterality Date    HX  SECTION      x 1     Social History     Occupational History    Not on file. Social History Main Topics    Smoking status: Former Smoker    Smokeless tobacco: Never Used    Alcohol use No    Drug use: No    Sexual activity: Yes     Partners: Male     Birth control/ protection: None     History reviewed. No pertinent family history. Allergies   Allergen Reactions    Codeine Anaphylaxis     Prior to Admission medications    Medication Sig Start Date End Date Taking? Authorizing Provider   DOXYLAMINE SUCCINATE/VIT B6 (DICLEGIS PO) Take 1 Tab by mouth daily. Yes Art Joaquin MD   ondansetron (ZOFRAN ODT) 4 mg disintegrating tablet Take 1 Tab by mouth every eight (8) hours as needed for Nausea. 17  Yes Johnson Kaba PA-C   promethazine (PHENERGAN) 25 mg tablet Take 1 Tab by mouth every six (6) hours as needed. 17  Yes Johnson Kaba PA-C   loperamide (IMODIUM) 2 mg capsule Take 2 mg by mouth four (4) times daily as needed for Diarrhea.     Historical Provider        Review of Systems    Objective:     Vitals:    Vitals:    17 1249 17 1604   BP: 115/72 108/64   Pulse: (!) 116 96   Resp: 15 14   Temp: 98.4 °F (36.9 °C) 98.6 °F (37 °C)   SpO2: 99% 100%   Weight: 54.4 kg (120 lb)    Height: 5' (1.524 m)       Temp (24hrs), Av.5 °F (36.9 °C), Min:98.4 °F (36.9 °C), Max:98.6 °F (37 °C)    BP  Min: 108/64  Max: 115/72     Physical Exam    Gen NAD  CV RR no M/G/R  Resp CTAB  Abd soft, gravid, nontender, positive (distant) bowel sounds x 4 quadrant   deferred as done by ER provider with closed cervix  Ext symmetric and nontender Labs:   Recent Results (from the past 24 hour(s))   CBC WITH AUTOMATED DIFF    Collection Time: 07/14/17 12:56 PM   Result Value Ref Range    WBC 12.0 (H) 3.6 - 11.0 K/uL    RBC 3.54 (L) 3.80 - 5.20 M/uL    HGB 8.9 (L) 11.5 - 16.0 g/dL    HCT 28.2 (L) 35.0 - 47.0 %    MCV 79.7 (L) 80.0 - 99.0 FL    MCH 25.1 (L) 26.0 - 34.0 PG    MCHC 31.6 30.0 - 36.5 g/dL    RDW 15.3 (H) 11.5 - 14.5 %    PLATELET 850 (H) 158 - 400 K/uL    NEUTROPHILS 72 32 - 75 %    LYMPHOCYTES 12 12 - 49 %    MONOCYTES 11 5 - 13 %    EOSINOPHILS 5 0 - 7 %    BASOPHILS 0 0 - 1 %    ABS. NEUTROPHILS 8.6 (H) 1.8 - 8.0 K/UL    ABS. LYMPHOCYTES 1.5 0.8 - 3.5 K/UL    ABS. MONOCYTES 1.4 (H) 0.0 - 1.0 K/UL    ABS. EOSINOPHILS 0.6 (H) 0.0 - 0.4 K/UL    ABS. BASOPHILS 0.0 0.0 - 0.1 K/UL   METABOLIC PANEL, COMPREHENSIVE    Collection Time: 07/14/17 12:56 PM   Result Value Ref Range    Sodium 137 136 - 145 mmol/L    Potassium 3.4 (L) 3.5 - 5.1 mmol/L    Chloride 103 97 - 108 mmol/L    CO2 25 21 - 32 mmol/L    Anion gap 9 5 - 15 mmol/L    Glucose 150 (H) 65 - 100 mg/dL    BUN 2 (L) 6 - 20 MG/DL    Creatinine 0.59 0.55 - 1.02 MG/DL    BUN/Creatinine ratio 3 (L) 12 - 20      GFR est AA >60 >60 ml/min/1.73m2    GFR est non-AA >60 >60 ml/min/1.73m2    Calcium 7.9 (L) 8.5 - 10.1 MG/DL    Bilirubin, total 0.2 0.2 - 1.0 MG/DL    ALT (SGPT) 13 12 - 78 U/L    AST (SGOT) 6 (L) 15 - 37 U/L    Alk. phosphatase 113 45 - 117 U/L    Protein, total 6.0 (L) 6.4 - 8.2 g/dL    Albumin 2.2 (L) 3.5 - 5.0 g/dL    Globulin 3.8 2.0 - 4.0 g/dL    A-G Ratio 0.6 (L) 1.1 - 2.2     TOTAL HCG, QT.     Collection Time: 07/14/17 12:56 PM   Result Value Ref Range    [place] [address] (H) 0 - 6 MIU/ML   URINALYSIS W/MICROSCOPIC    Collection Time: 07/14/17  3:26 PM   Result Value Ref Range    Color DARK YELLOW      Appearance CLEAR CLEAR      Specific gravity 1.025 1.003 - 1.030      pH (UA) 6.0 5.0 - 8.0      Protein 30 (A) NEG mg/dL    Glucose NEGATIVE  NEG mg/dL    Ketone TRACE (A) NEG mg/dL    Bilirubin NEGATIVE  NEG      Blood NEGATIVE  NEG      Urobilinogen 0.2 0.2 - 1.0 EU/dL    Nitrites NEGATIVE  NEG      Leukocyte Esterase NEGATIVE  NEG      WBC 5-10 0 - 4 /hpf    RBC 0-5 0 - 5 /hpf    Epithelial cells FEW FEW /lpf    Bacteria NEGATIVE  NEG /hpf    Mucus 3+ (A) NEG /lpf    CA Oxalate crystals FEW (A) NEG         Patient Active Problem List   Diagnosis Code    Crohn's disease ([facility] K50.90     Assessment and Plan:      @ 17 wks with hyperemesis and likely Crohn's flare from suboptimal management  --Admit to [facility]  --IVF, correction of electrolytes  --antiemetics as needed  --GI consult called; Dr. Osito Vargas will see patient in AM:  Labs pending in meantime include c.diff, stool culture, amylase/lipase/CRP  --Will repeat CBC/CMP in AM; I relayed to Dr. Osito Vargas that there was not a left shift, however there is a mild shift (when viewing [address] instead of neutrophil %); if patient has fever tonight or if WBC is continuing to increase we will start on empiric antibiotics (Dr. Osito Vargas recommends Augmentin, as Cipro is contraindicated in pregnancy)  --Discussed with patient and significant other my recommendation to be on maintenance meds for her Crohn's; we will consult with GI to establish the safest treatment course during pregnancy; they are understandably anxious with this pregnancy given their recent loss; however, we did discuss potential pregnancy complications with poorly controlled Crohn's  --pt is interested in delivering @ [facility] memories are difficult for them) and would like to be given names of OB groups with privileges @ [facility] for ongoing obstetric care      Signed By:  Larisa Martel MD     2017                 istor

## 2017-07-14 NOTE — IP AVS SNAPSHOT
[address] 
[phone] Patient: Kelly Martínez MRN: [id number] :1995 You are allergic to the following Allergen Reactions Codeine Anaphylaxis Recent Documentation Height Weight Breastfeeding? BMI OB Status Smoking Status 1.524 m 56.2 kg No 24.18 kg/m2 Pregnant Former Smoker Emergency Contacts Name Discharge Info Relation Home Work Bryson Montalvo CAREGIVER [3] Spouse [3] [phone] About your hospitalization You were admitted on:  2017 You last received care in the:  [address] [place] [address] Road ANTEPARTUM/MI You were discharged on:  2017 Unit phone number:  [phone] Why you were hospitalized Your primary diagnosis was:  Not on File Your diagnoses also included:  Ulcerative Pancolitis With Rectal Bleeding (Hcc), Hyperemesis Arising During Pregnancy, Previous  Section Providers Seen During Your Hospitalizations Provider Role Specialty Primary office phone Osmar Steen MD Attending Provider Emergency Medicine [phone] Filemon Zelaya MD Attending Provider Obstetrics & Gynecology [phone] Your Primary Care Physician (PCP) Primary Care Physician Office Phone Office Fax NONE ** None ** ** None ** Follow-up Information Follow up With Details Comments Contact Info None   None ([phone]) Patient stated that they have no PCP Bruce Silva DO Schedule an appointment as soon as possible for a visit in 2 weeks  [address] [facility] [address] [place] [address] 
[phone] Shannan Quinones MD Schedule an appointment as soon as possible for a visit in 1 week  [address] [place] 
[phone] Current Discharge Medication List  
  
START taking these medications Dose & Instructions Dispensing Information Comments Morning Noon Evening Bedtime  
 folic acid 1 mg tablet Commonly known as:  Google Your last dose was: Your next dose is:    
   
   
 Dose:  2 mg Take 2 Tabs by mouth daily. Quantity:  40 Tab Refills:  3  
     
   
   
   
  
 mesalamine 1.2 gram delayed release tablet Commonly known as:  Rashel Lilly Your last dose was: Your next dose is:    
   
   
 Dose:  2.4 g Take 2 Tabs by mouth Daily (before breakfast). Quantity:  40 Tab Refills:  3  
     
   
   
   
  
 oxyCODONE-acetaminophen 5-325 mg per tablet Commonly known as:  PERCOCET Your last dose was: Your next dose is:    
   
   
 Dose:  1 Tab Take 1 Tab by mouth every six (6) hours as needed. Max Daily Amount: 4 Tabs. Quantity:  15 Tab Refills:  0  
     
   
   
   
  
 pantoprazole 40 mg tablet Commonly known as:  PROTONIX Your last dose was: Your next dose is:    
   
   
 Dose:  40 mg Take 1 Tab by mouth daily. Quantity:  40 Tab Refills:  2  
     
   
   
   
  
 predniSONE 10 mg tablet Commonly known as:  Tye York Your last dose was: Your next dose is: Take 40 mg by mouth daily x 1 week, then 35 mg by mouth daily x 1 week, then 30 mg by mouth daily x 1 week, then 25 mg by mouth daily x 1 week, then 20 mg by mouth daily x 1 week, then 15 mg by mouth daily x 1 week, then 10 mg by mouth daily x 1 week, then 5 mg by mouth daily x 1 week Quantity:  126 Tab Refills:  0 CONTINUE these medications which have NOT CHANGED Dose & Instructions Dispensing Information Comments Morning Noon Evening Bedtime DICLEGIS PO Your last dose was: Your next dose is:    
   
   
 Dose:  1 Tab Take 1 Tab by mouth daily. Refills:  0  
     
   
   
   
  
 ondansetron 4 mg disintegrating tablet Commonly known as:  ZOFRAN ODT Your last dose was: Your next dose is: Dose:  4 mg Take 1 Tab by mouth every eight (8) hours as needed for Nausea. Quantity:  10 Tab Refills:  0  
     
   
   
   
  
 promethazine 25 mg tablet Commonly known as:  PHENERGAN Your last dose was: Your next dose is:    
   
   
 Dose:  25 mg Take 1 Tab by mouth every six (6) hours as needed. Quantity:  12 Tab Refills:  0 STOP taking these medications   
 loperamide 2 mg capsule Commonly known as:  IMODIUM Where to Get Your Medications Information on where to get these meds will be given to you by the nurse or doctor. ! Ask your nurse or doctor about these medications  
  folic acid 1 mg tablet  
 mesalamine 1.2 gram delayed release tablet  
 oxyCODONE-acetaminophen 5-325 mg per tablet  
 pantoprazole 40 mg tablet  
 predniSONE 10 mg tablet Discharge Instructions Name: Chin Champagne YOB: 1995 Primary Diagnosis: Active Problems: 
  Ulcerative pancolitis with rectal bleeding ([facility] [address] (2017) Hyperemesis arising during pregnancy (2017) Previous  section (2017) ? General:  
 
 
 
Diet/Diet Restrictions:   
 
Anything you like/tolerate Physical Activity / Restrictions / Safety:  
 
* Activity at home is based on how strong your  labor has been, You should follow the following activity guidelines. As tolerated, avoid heavy lifting. Discharge Instructions/ Special Treatment/ Home Care Needs:  
 
Call your provider if: 
? Uterine cramping (menstrual-like cramps, intermittent or constant ? Uterine contractions every 10-15 minutes or more frequently ? Low abdominal pressure ( pelvic pressure) ? Dull low backache (intermittent or constant) ? Increase or change in vaginal discharge ? Feeling that the baby is \"pushing down\" ? Abdominal cramping with or without diarrhea If any of these symptoms are experienced, stop what you are doing, lie down on your side, drink two to three glasses of water and wait one hour. If the symptoms persist or get worse, call your provider. Pain Management:  
 
 
 
 
Signed By: Jony Ferrara RN                                                                                                   Date: 7/20/2017 Time: 5:36 PM 
 
Discharge Checklist-NURSING TO COMPLETE:  
 
Date and Time of Discharge: Date: 7/20/2017 Time: 5:36 PM 
 
Return of:  
Dental Appliance: Dental Appliances: None Vision: Visual Aid: None Hearing Aid:   
Jewelry: Jewelry: None Clothing: Clothing: Footwear, With patient, At bedside, Pants, Shirt, Slippers Other Valuables: Other Valuables: [address] Valuables sent to safe:   
 
Prescription Given: yes Medication Instruction Sheet(s), including side effects, provided: yes Accompanied By: Family Mode of Transportation: 
 
Discharge Disposition: Home I have had the opportunity to make my options or choices for discharge. I have received and understand these instructions. These are general instructions for a healthy lifestyle: No smoking/ No tobacco products/ Avoid exposure to second hand smoke Surgeon General's Warning:  Quitting smoking now greatly reduces serious risk to your health. Obesity, smoking, and sedentary lifestyle greatly increases your risk for illness A healthy diet, regular physical exercise & weight monitoring are important for maintaining a healthy lifestyle Recognize signs and symptoms of STROKE: 
 
F-face looks uneven A-arms unable to move or move unevenly S-speech slurred or non-existent T-time-call 911 as soon as signs and symptoms begin-DO NOT go Back to bed or wait to see if you get better-TIME IS BRAIN. Discharge Orders None [facility] Announcement  We are excited to announce that we are making your provider's discharge notes available to you in [employer]. You will see these notes when they are completed and signed by the physician that discharged you from your recent hospital stay. If you have any questions or concerns about any information you see in [employer], please call the Health Information Department where you were seen or reach out to your Primary Care Provider for more information about your plan of care. Introducing [facility] SERVICES! Rafia Steen introduces [employer] patient portal. Now you can access parts of your medical record, email your doctor's office, and request medication refills online. 1. In your internet browser, go to [email or web address] to Photos. [email or web address] to Photos 2. Click on the First Time User? Click Here link in the Sign In box. You will see the New Member Sign Up page. 3. Enter your [employer] Access Code exactly as it appears below. You will not need to use this code after youve completed the sign-up process. If you do not sign up before the expiration date, you must request a new code. · [employer] Access Code: [email or web address] Expires: 8/4/2017  9:46 AM 
 
4. Enter the last four digits of your Social Security Number (xxxx) and Date of Birth (mm/dd/yyyy) as indicated and click Submit. You will be taken to the next sign-up page. 5. Create a [employer] ID. This will be your [employer] login ID and cannot be changed, so think of one that is secure and easy to remember. 6. Create a [employer] password. You can change your password at any time. 7. Enter your Password Reset Question and Answer. This can be used at a later time if you forget your password. 8. Enter your e-mail address. You will receive e-mail notification when new information is available in [address] 9. Click Sign Up. You can now view and download portions of your medical record. 10. Click the Download Summary menu link to download a portable copy of your medical information. If you have questions, please visit the Frequently Asked Questions section of the MyChart website. Remember, MyChart is NOT to be used for urgent needs. For medical emergencies, dial 911. Now available from your iPhone and Android! General Information Please provide this summary of care documentation to your next provider. Patient Signature:  ____________________________________________________________ Date:  ____________________________________________________________  
  
Sissy Lapine Provider Signature:  ____________________________________________________________ Date:  ____________________________________________________________

## 2017-07-14 NOTE — ED PROVIDER NOTES
HPI Comments: 25 yo  F, 17 weeks pregnant (pt of Dr. Merry Dover) with hx of Crohn's disease, PCOS, placenta abruption ()  presents ambulatory to the ED for vomiting, diarrhea, abdominal pain, pressure in abdomen that radiates downward to vagina and rectum (since yesterday). The pt states she was evaluated 3 days ago in ED for nausea and vomiting and since that time has continued to have N/V/D and new abdominal pain that radiates to vagina and rectum. She denies vaginal bleeding, urinary discomfort. States she does have a vaginal discharge but denies change or increase recently. She has not had improvement of sx with Zofran, Phenergan and states she is unable to tolerate Diclegis during the day because it makes her too sleepy to function. Past Medical History:  No date: Crohn's colitis ([facility] [address]  No date: H/O  section  No date: PCOS (polycystic ovarian syndrome)  No date: Placental abruption    Social History    Marital status: SINGLE              Spouse name:                       Years of education:                 Number of children:               Occupational History    None on file    Social History Main Topics    Smoking status: Former Smoker                                                                Packs/day: 0.00      Years: 0.00        Smokeless status: Never Used                        Alcohol use: No              Drug use: No              Sexual activity: Yes               Partners with: Male       Birth control/protection: None    Other Topics            Concern    None on file    Social History Narrative    None on file           Patient is a 24 y.o. female presenting with pregnancy problem. The history is provided by the patient. No  was used. Pregnancy Problem    Associated symptoms include diarrhea, nausea and vomiting. Pertinent negatives include no fever, no dysuria, no frequency, no hematuria, no headaches, no chest pain and no back pain.         Past Medical History:   Diagnosis Date    Crohn's colitis ([facility] [address]     H/O  section     PCOS (polycystic ovarian syndrome)     Placental abruption        Past Surgical History:   Procedure Laterality Date    HX  SECTION      x 1         History reviewed. No pertinent family history. Social History     Social History    Marital status: SINGLE     Spouse name: N/A    Number of children: N/A    Years of education: N/A     Occupational History    Not on file. Social History Main Topics    Smoking status: Former Smoker    Smokeless tobacco: Never Used    Alcohol use No    Drug use: No    Sexual activity: Yes     Partners: Male     Birth control/ protection: None     Other Topics Concern    Not on file     Social History Narrative         ALLERGIES: Codeine    Review of Systems   Constitutional: Negative for activity change, appetite change, chills and fever. HENT: Negative for ear discharge and facial swelling. Eyes: Negative for discharge and redness. Respiratory: Negative for chest tightness, shortness of breath and wheezing. Cardiovascular: Negative for chest pain, palpitations and leg swelling. Gastrointestinal: Positive for diarrhea, nausea and vomiting. Negative for abdominal pain and rectal pain. Genitourinary: Positive for pelvic pain and vaginal discharge. Negative for difficulty urinating, dysuria, flank pain, frequency, hematuria, urgency and vaginal bleeding. Musculoskeletal: Negative for back pain, joint swelling, neck pain and neck stiffness. Skin: Negative for rash. Neurological: Negative for seizures, speech difficulty, weakness and headaches. Psychiatric/Behavioral: Negative for confusion. Vitals:    17 1249   BP: 115/72   Pulse: (!) 116   Resp: 15   Temp: 98.4 °F (36.9 °C)   SpO2: 99%   Weight: 54.4 kg (120 lb)   Height: 5' (1.524 m)            Physical Exam   Constitutional: She is oriented to person, place, and time.  She appears well-developed and well-nourished. No distress. HENT:   Head: Normocephalic and atraumatic. Eyes: Conjunctivae and EOM are normal. Pupils are equal, round, and reactive to light. Neck: Normal range of motion. Neck supple. Cardiovascular: Normal rate, regular rhythm, normal heart sounds and intact distal pulses. Pulmonary/Chest: Effort normal and breath sounds normal.   B breath sounds CTA. No expiratory wheezing, crackles or rhonchi. No chest wall tenderness, tachypnea or tachycardia. No evidence of hypoxia. Abdominal: Soft. Bowel sounds are normal. She exhibits no distension and no mass. There is tenderness. There is no rigidity, no rebound, no guarding and no CVA tenderness. Abdomen soft with active BS throughout. Generalized tenderness to mild palpation. No flank or CVA tenderness. No rigidity or masses. Genitourinary: No bleeding in the vagina. Vaginal discharge found. Musculoskeletal: Normal range of motion. Neurological: She is alert and oriented to person, place, and time. She has normal strength. She displays no atrophy. No cranial nerve deficit or sensory deficit. She exhibits normal muscle tone. Coordination and gait normal. GCS eye subscore is 4. GCS verbal subscore is 5. GCS motor subscore is 6. Skin: Skin is warm and dry. Psychiatric: She has a normal mood and affect. Her behavior is normal. Judgment and thought content normal.   Nursing note and vitals reviewed. MDM  Number of Diagnoses or Management Options  Abdominal pain, generalized:   Crohn's disease with complication, unspecified gastrointestinal tract location [facility]):   Nausea and vomiting in pregnancy:   Diagnosis management comments: 23 yo  F, 17 weeks pregnant, pt of Dr. Mary Kate Gomez, presents with persistent N/V/D and abdominal pain that is constant in her upper abdomen and \"comes in waves\" becoming more intense and in the front of her abdomen, going down into pelvis and rectum.   She denies vaginal bleeding. States she has a hx of Crohn's disease (does not have GI) and placenta abruption in December. Abdomen soft with generalized tenderness. No rigidity noted. Labs, UA, hydration, medication for nausea, pelvic exam, pelvic US ordered. Pelvic examination performed. Os closed. Results reviewed. Anemia noted which is stable  from labs on 7/11/17. Calcium oxilate crystals noted in urine. Discussed hx, presentation and findings with Dr. Eliezer Melendez who agrees with plan of care. Consult to Dr. CHELSEA GOMEZ (on call for Dr. Rosalinda Henley) [facility] not feel comfortable with discharge. Renal US ordered. No evidence of hydronephrosis or renal stone. Consult to [place] hospitalist, Dr. Brenda Stuart. Hx, presentation and findings reviewed with Dr. Brenda Stuart who requests Abd US and states she will come to ED to evaluate the pt. Abd US reviewed. Discussed findings with Dr. Brenda Stuart who evaluated pt in the ED and will admit the pt to the hospital and consult GI regarding Crohn's disease. Amount and/or Complexity of Data Reviewed  Clinical lab tests: ordered and reviewed  Tests in the radiology section of CPT®: ordered and reviewed  Discuss the patient with other providers: yes (Dr. Eliezer Melendez, Dr. CHELSEA GOMEZ, Dr. Brenda Stuart)    Patient Progress  Patient progress: stable    ED Course       Pelvic Exam  Date/Time: 7/14/2017 2:38 PM  Performed by: NP  Type of exam performed: speculum. External genitalia appearance: normal.    Vaginal exam:  normal.    Cervical exam:  os closed. Consult note:  Consult to Dr. CHELSEA GOMEZ, on call for Dr. Rosalinda Henley regarding pt's hx, presentation and findings to this points. Suggests admission for pt to determine etiology of abdominal discomfort.   Requests consult to OB Hospitalist.4:46 PM    LABORATORY TESTS:  Recent Results (from the past 12 hour(s))   CBC WITH AUTOMATED DIFF    Collection Time: 07/14/17 12:56 PM   Result Value Ref Range    WBC 12.0 (H) 3.6 - 11.0 K/uL    RBC 3.54 (L) 3.80 - 5.20 M/uL    HGB 8.9 (L) 11.5 - 16.0 g/dL    HCT 28.2 (L) 35.0 - 47.0 %    MCV 79.7 (L) 80.0 - 99.0 FL    MCH 25.1 (L) 26.0 - 34.0 PG    MCHC 31.6 30.0 - 36.5 g/dL    RDW 15.3 (H) 11.5 - 14.5 %    PLATELET 223 (H) 156 - 400 K/uL    NEUTROPHILS 72 32 - 75 %    LYMPHOCYTES 12 12 - 49 %    MONOCYTES 11 5 - 13 %    EOSINOPHILS 5 0 - 7 %    BASOPHILS 0 0 - 1 %    ABS. NEUTROPHILS 8.6 (H) 1.8 - 8.0 K/UL    ABS. LYMPHOCYTES 1.5 0.8 - 3.5 K/UL    ABS. MONOCYTES 1.4 (H) 0.0 - 1.0 K/UL    ABS. EOSINOPHILS 0.6 (H) 0.0 - 0.4 K/UL    ABS. BASOPHILS 0.0 0.0 - 0.1 K/UL   METABOLIC PANEL, COMPREHENSIVE    Collection Time: 07/14/17 12:56 PM   Result Value Ref Range    Sodium 137 136 - 145 mmol/L    Potassium 3.4 (L) 3.5 - 5.1 mmol/L    Chloride 103 97 - 108 mmol/L    CO2 25 21 - 32 mmol/L    Anion gap 9 5 - 15 mmol/L    Glucose 150 (H) 65 - 100 mg/dL    BUN 2 (L) 6 - 20 MG/DL    Creatinine 0.59 0.55 - 1.02 MG/DL    BUN/Creatinine ratio 3 (L) 12 - 20      GFR est AA >60 >60 ml/min/1.73m2    GFR est non-AA >60 >60 ml/min/1.73m2    Calcium 7.9 (L) 8.5 - 10.1 MG/DL    Bilirubin, total 0.2 0.2 - 1.0 MG/DL    ALT (SGPT) 13 12 - 78 U/L    AST (SGOT) 6 (L) 15 - 37 U/L    Alk. phosphatase 113 45 - 117 U/L    Protein, total 6.0 (L) 6.4 - 8.2 g/dL    Albumin 2.2 (L) 3.5 - 5.0 g/dL    Globulin 3.8 2.0 - 4.0 g/dL    A-G Ratio 0.6 (L) 1.1 - 2.2     TOTAL HCG, QT.     Collection Time: 07/14/17 12:56 PM   Result Value Ref [place] [address] (H) 0 - 6 MIU/ML   URINALYSIS W/MICROSCOPIC    Collection Time: 07/14/17  3:26 PM   Result Value Ref Range    Color DARK YELLOW      Appearance CLEAR CLEAR      Specific gravity 1.025 1.003 - 1.030      pH (UA) 6.0 5.0 - 8.0      Protein 30 (A) NEG mg/dL    Glucose NEGATIVE  NEG mg/dL    Ketone TRACE (A) NEG mg/dL    Bilirubin NEGATIVE  NEG      Blood NEGATIVE  NEG      Urobilinogen 0.2 0.2 - 1.0 EU/dL    Nitrites NEGATIVE  NEG      Leukocyte Esterase NEGATIVE  NEG      WBC 5-10 0 - 4 /hpf    RBC 0-5 0 - 5 /hpf    Epithelial cells FEW FEW /lpf    Bacteria NEGATIVE  NEG /hpf    Mucus 3+ (A) NEG /lpf    CA Oxalate crystals FEW (A) NEG         IMAGING RESULTS:  [facility]   Final Result      US RETROPERITONEUM COMP    (Results Pending)       MEDICATIONS GIVEN:  Medications   ondansetron (ZOFRAN) injection 4 mg (4 mg IntraVENous Given 7/14/17 1422)   0.9% sodium chloride infusion 1,000 mL (1,000 mL IntraVENous New Bag 7/14/17 1422)       IMPRESSION:  No diagnosis found.     PLAN:

## 2017-07-14 NOTE — ED TRIAGE NOTES
\"I'm 17 weeks pregnant and was seen here 3 days ago for the same but today it's worse. I'm having abdominal pain with N/V/D and the pain feels stronger than [place]\". Denies vaginal bleeding.

## 2017-07-14 NOTE — IP AVS SNAPSHOT
[address] 
[phone] Patient: Nohemy Almanzar MRN: [id number] :1995 Current Discharge Medication List  
  
START taking these medications Dose & Instructions Dispensing Information Comments Morning Noon Evening Bedtime  
 folic acid 1 mg tablet Commonly known as:  Google Your last dose was: Your next dose is:    
   
   
 Dose:  2 mg Take 2 Tabs by mouth daily. Quantity:  40 Tab Refills:  3  
     
   
   
   
  
 mesalamine 1.2 gram delayed release tablet Commonly known as:  Rita Hubbard Your last dose was: Your next dose is:    
   
   
 Dose:  2.4 g Take 2 Tabs by mouth Daily (before breakfast). Quantity:  40 Tab Refills:  3  
     
   
   
   
  
 oxyCODONE-acetaminophen 5-325 mg per tablet Commonly known as:  PERCOCET Your last dose was: Your next dose is:    
   
   
 Dose:  1 Tab Take 1 Tab by mouth every six (6) hours as needed. Max Daily Amount: 4 Tabs. Quantity:  15 Tab Refills:  0  
     
   
   
   
  
 pantoprazole 40 mg tablet Commonly known as:  PROTONIX Your last dose was: Your next dose is:    
   
   
 Dose:  40 mg Take 1 Tab by mouth daily. Quantity:  40 Tab Refills:  2  
     
   
   
   
  
 predniSONE 10 mg tablet Commonly known as:  Edrie Power Your last dose was: Your next dose is: Take 40 mg by mouth daily x 1 week, then 35 mg by mouth daily x 1 week, then 30 mg by mouth daily x 1 week, then 25 mg by mouth daily x 1 week, then 20 mg by mouth daily x 1 week, then 15 mg by mouth daily x 1 week, then 10 mg by mouth daily x 1 week, then 5 mg by mouth daily x 1 week Quantity:  126 Tab Refills:  0 CONTINUE these medications which have NOT CHANGED Dose & Instructions Dispensing Information Comments Morning Noon Evening Bedtime  DICLEGIS PO  
 Your last dose was: Your next dose is:    
   
   
 Dose:  1 Tab Take 1 Tab by mouth daily. Refills:  0  
     
   
   
   
  
 ondansetron 4 mg disintegrating tablet Commonly known as:  ZOFRAN ODT Your last dose was: Your next dose is:    
   
   
 Dose:  4 mg Take 1 Tab by mouth every eight (8) hours as needed for Nausea. Quantity:  10 Tab Refills:  0  
     
   
   
   
  
 promethazine 25 mg tablet Commonly known as:  PHENERGAN Your last dose was: Your next dose is:    
   
   
 Dose:  25 mg Take 1 Tab by mouth every six (6) hours as needed. Quantity:  12 Tab Refills:  0 STOP taking these medications   
 loperamide 2 mg capsule Commonly known as:  IMODIUM Where to Get Your Medications Information on where to get these meds will be given to you by the nurse or doctor. ! Ask your nurse or doctor about these medications  
  folic acid 1 mg tablet  
 mesalamine 1.2 gram delayed release tablet  
 oxyCODONE-acetaminophen 5-325 mg per tablet  
 pantoprazole 40 mg tablet  
 predniSONE 10 mg tablet

## 2017-07-15 LAB
ALBUMIN SERPL BCP-MCNC: 1.9 G/DL (ref 3.5–5)
ALBUMIN/GLOB SERPL: 0.6 {RATIO} (ref 1.1–2.2)
ALP SERPL-CCNC: 98 U/L (ref 45–117)
ALT SERPL-CCNC: 10 U/L (ref 12–78)
ANION GAP BLD CALC-SCNC: 7 MMOL/L (ref 5–15)
AST SERPL W P-5'-P-CCNC: 4 U/L (ref 15–37)
BASOPHILS # BLD AUTO: 0 K/UL (ref 0–0.1)
BASOPHILS # BLD: 0 % (ref 0–1)
BILIRUB SERPL-MCNC: 0.2 MG/DL (ref 0.2–1)
BUN SERPL-MCNC: 2 MG/DL (ref 6–20)
BUN/CREAT SERPL: 5 (ref 12–20)
C DIFF TOX GENS STL QL NAA+PROBE: NEGATIVE
CALCIUM SERPL-MCNC: 7.1 MG/DL (ref 8.5–10.1)
CHLORIDE SERPL-SCNC: 109 MMOL/L (ref 97–108)
CO2 SERPL-SCNC: 24 MMOL/L (ref 21–32)
CREAT SERPL-MCNC: 0.41 MG/DL (ref 0.55–1.02)
EOSINOPHIL # BLD: 0.7 K/UL (ref 0–0.4)
EOSINOPHIL NFR BLD: 7 % (ref 0–7)
ERYTHROCYTE [DISTWIDTH] IN BLOOD BY AUTOMATED COUNT: 15.5 % (ref 11.5–14.5)
GLOBULIN SER CALC-MCNC: 3.3 G/DL (ref 2–4)
GLUCOSE SERPL-MCNC: 86 MG/DL (ref 65–100)
HCT VFR BLD AUTO: 24.3 % (ref 35–47)
HGB BLD-MCNC: 7.5 G/DL (ref 11.5–16)
LYMPHOCYTES # BLD AUTO: 18 % (ref 12–49)
LYMPHOCYTES # BLD: 1.8 K/UL (ref 0.8–3.5)
MCH RBC QN AUTO: 24.4 PG (ref 26–34)
MCHC RBC AUTO-ENTMCNC: 30.9 G/DL (ref 30–36.5)
MCV RBC AUTO: 79.2 FL (ref 80–99)
MONOCYTES # BLD: 1.2 K/UL (ref 0–1)
MONOCYTES NFR BLD AUTO: 12 % (ref 5–13)
NEUTS SEG # BLD: 6.1 K/UL (ref 1.8–8)
NEUTS SEG NFR BLD AUTO: 63 % (ref 32–75)
PLATELET # BLD AUTO: 356 K/UL (ref 150–400)
POTASSIUM SERPL-SCNC: 3.2 MMOL/L (ref 3.5–5.1)
PROT SERPL-MCNC: 5.2 G/DL (ref 6.4–8.2)
RBC # BLD AUTO: 3.07 M/UL (ref 3.8–5.2)
SODIUM SERPL-SCNC: 140 MMOL/L (ref 136–145)
WBC # BLD AUTO: 9.7 K/UL (ref 3.6–11)

## 2017-07-15 PROCEDURE — 74011000258 HC RX REV CODE- 258: Performed by: INTERNAL MEDICINE

## 2017-07-15 PROCEDURE — 74011000250 HC RX REV CODE- 250: Performed by: OBSTETRICS & GYNECOLOGY

## 2017-07-15 PROCEDURE — 74011250636 HC RX REV CODE- 250/636: Performed by: INTERNAL MEDICINE

## 2017-07-15 PROCEDURE — 74011250637 HC RX REV CODE- 250/637: Performed by: OBSTETRICS & GYNECOLOGY

## 2017-07-15 PROCEDURE — C9113 INJ PANTOPRAZOLE SODIUM, VIA: HCPCS | Performed by: OBSTETRICS & GYNECOLOGY

## 2017-07-15 PROCEDURE — 87045 FECES CULTURE AEROBIC BACT: CPT | Performed by: OBSTETRICS & GYNECOLOGY

## 2017-07-15 PROCEDURE — 85025 COMPLETE CBC W/AUTO DIFF WBC: CPT | Performed by: OBSTETRICS & GYNECOLOGY

## 2017-07-15 PROCEDURE — 74011250636 HC RX REV CODE- 250/636: Performed by: OBSTETRICS & GYNECOLOGY

## 2017-07-15 PROCEDURE — 74011000258 HC RX REV CODE- 258: Performed by: OBSTETRICS & GYNECOLOGY

## 2017-07-15 PROCEDURE — 36415 COLL VENOUS BLD VENIPUNCTURE: CPT | Performed by: OBSTETRICS & GYNECOLOGY

## 2017-07-15 PROCEDURE — 65410000002 HC RM PRIVATE OB

## 2017-07-15 PROCEDURE — 87493 C DIFF AMPLIFIED PROBE: CPT | Performed by: OBSTETRICS & GYNECOLOGY

## 2017-07-15 PROCEDURE — 80053 COMPREHEN METABOLIC PANEL: CPT | Performed by: OBSTETRICS & GYNECOLOGY

## 2017-07-15 RX ORDER — SODIUM CHLORIDE 0.9 % (FLUSH) 0.9 %
SYRINGE (ML) INJECTION
Status: DISCONTINUED
Start: 2017-07-15 | End: 2017-07-15

## 2017-07-15 RX ORDER — SODIUM CHLORIDE 0.9 % (FLUSH) 0.9 %
5-10 SYRINGE (ML) INJECTION AS NEEDED
Status: DISCONTINUED | OUTPATIENT
Start: 2017-07-15 | End: 2017-07-20 | Stop reason: HOSPADM

## 2017-07-15 RX ORDER — SODIUM CHLORIDE 0.9 % (FLUSH) 0.9 %
5-10 SYRINGE (ML) INJECTION EVERY 8 HOURS
Status: DISCONTINUED | OUTPATIENT
Start: 2017-07-15 | End: 2017-07-15

## 2017-07-15 RX ORDER — SODIUM CHLORIDE 0.9 % (FLUSH) 0.9 %
5-10 SYRINGE (ML) INJECTION EVERY 8 HOURS
Status: DISCONTINUED | OUTPATIENT
Start: 2017-07-15 | End: 2017-07-20 | Stop reason: HOSPADM

## 2017-07-15 RX ADMIN — DEXTROSE MONOHYDRATE AND SODIUM CHLORIDE 125 ML/HR: 5; .9 INJECTION, SOLUTION INTRAVENOUS at 00:08

## 2017-07-15 RX ADMIN — ONDANSETRON 4 MG: 2 INJECTION INTRAMUSCULAR; INTRAVENOUS at 11:46

## 2017-07-15 RX ADMIN — Medication 10 ML: at 22:00

## 2017-07-15 RX ADMIN — PIPERACILLIN SODIUM,TAZOBACTAM SODIUM 3.38 G: 3; .375 INJECTION, POWDER, FOR SOLUTION INTRAVENOUS at 20:05

## 2017-07-15 RX ADMIN — DEXTROSE MONOHYDRATE AND SODIUM CHLORIDE 125 ML/HR: 5; .9 INJECTION, SOLUTION INTRAVENOUS at 16:42

## 2017-07-15 RX ADMIN — Medication 10 ML: at 00:17

## 2017-07-15 RX ADMIN — ACETAMINOPHEN 650 MG: 325 TABLET, FILM COATED ORAL at 03:32

## 2017-07-15 RX ADMIN — ONDANSETRON 4 MG: 2 INJECTION INTRAMUSCULAR; INTRAVENOUS at 18:13

## 2017-07-15 RX ADMIN — ACETAMINOPHEN 650 MG: 325 TABLET, FILM COATED ORAL at 11:46

## 2017-07-15 RX ADMIN — DEXTROSE MONOHYDRATE AND SODIUM CHLORIDE 125 ML/HR: 5; .9 INJECTION, SOLUTION INTRAVENOUS at 08:23

## 2017-07-15 RX ADMIN — SODIUM CHLORIDE 40 MG: 9 INJECTION INTRAMUSCULAR; INTRAVENOUS; SUBCUTANEOUS at 19:58

## 2017-07-15 NOTE — ROUTINE PROCESS
TRANSFER - OUT REPORT:    Verbal report given to ROM Quinn (name) on Chrissy Concepcion  being transferred to Antepartum (unit) for routine progression of care       Report consisted of patients Situation, Background, Assessment and   Recommendations(SBAR). Information from the following report(s) SBAR, ED Summary, [facility] and Recent Results was reviewed with the receiving nurse. Lines:   Peripheral IV 07/14/17 Left Antecubital (Active)   Site Assessment Clean, dry, & intact 7/14/2017  1:02 PM   Phlebitis Assessment 0 7/14/2017  1:02 PM   Infiltration Assessment 0 7/14/2017  1:02 PM   Dressing Status Clean, dry, & intact 7/14/2017  1:02 PM   Dressing Type Tape;Transparent 7/14/2017  1:02 PM   Hub Color/Line Status Pink;Capped;Flushed;Patent 7/14/2017  1:02 PM   Action Taken Blood drawn 7/14/2017  1:02 PM        Opportunity for questions and clarification was provided. Patient transported with:   Tech      UPDATE: Pt assessment unchanged, VSS, waiting on transport to inpatient unit.      Visit Vitals    /66 (BP 1 Location: Right arm, BP Patient Position: At rest)    Pulse 95    Temp 98.5 °F (36.9 °C)    Resp 14    Ht 5' (1.524 m)    Wt 54.4 kg (120 lb)    SpO2 99%    BMI 23.44 kg/m2

## 2017-07-15 NOTE — PROGRESS NOTES
High Risk Obstetrics Progress Note    Name: Jeramie Lange MRN: [id number]  SSN: [id number]    YOB: 1995  Age: 24 y.o. Sex: female      Subjective:      LOS: 1 day    Estimated Date of Delivery: 17   Gestational Age Today: 17w2d     Patient admitted for nausea and vomiting and bloody diarrhea and abdominal pain with a history of Crohn's disease. Additionally, she has already been admitted this pregnancy with bloody diarrhea felt to be a Crohn's flare. She has declined any standing Crohn's disease maintenance medication. States she does have moderate abdominal pain and diarrhea and nausea and does not have chest pain, contractions, fever, headache , pelvic pressure, right upper quadrant pain  , shortness of breath, swelling, vaginal bleeding , vaginal leaking of fluid  and visual disturbances. Objective:     Vitals:  Blood pressure 108/46, pulse 88, temperature 98.4 °F (36.9 °C), resp. rate 16, height 5' (1.524 m), weight 54.4 kg (120 lb), SpO2 99 %, not currently breastfeeding.    Temp (24hrs), Av.4 °F (36.9 °C), Min:98.4 °F (36.9 °C), Max:98.6 °F (37 °C)    Systolic (74SLI), KCF:665 , Min:96 , KPK:826      Diastolic (74ZCO), HAH:63, Min:46, Max:72     07/15/17 0820 98.4 °F (36.9 °C) 88 108/46 Right arm  16  Room air  Alert 1      07/15/17 0546 98.4 °F (36.9 °C) 77 96/58 Right arm -- 16 -- -- -- Alert 2 --     17 2205 98.4 °F (36.9 °C) 88 107/52 Right arm At rest 14 -- Room air -- Alert 0 --     17 98.4 °F (36.9 °C) 94 116/59 Right arm At rest 14 -- -- -- Alert 0 --     17 98.5 °F (36.9 °C) 95 110/66 Right arm At rest 14 99 % Room air -- Alert 0 --     17 1604 98.6 °F (37 °C) 96 108/64 Right arm At rest 14 100 % Room air -- Alert 0 --     17 1249 98.4 °F (36.9 °C)  116 115/72 Left arm At rest 15 99 % Room air -- Alert 3 54.4 kg (120 lb)          Intake and Output:       Date 07/15/17 0700 - 17 0659   Shift 6816-6899 6993-0238 8720-6578 51 Hour Total   I  N  T  A  K  E   I.V.  (mL/kg/hr) 191.7   191.7    Shift Total  (mL/kg) 191.7  (3.5)   191.7  (3.5)   O  U  T  P  U  T   Shift Total  (mL/kg)       Weight (kg) 54.4 54.4 54.4 54.4       Physical Exam:  Deferred       Membranes:  Intact    Uterine Activity:  None    Fetal Heart Rate:  +doppler        Labs:   Recent Results (from the past 36 hour(s))   CBC WITH AUTOMATED DIFF    Collection Time: 07/14/17 12:56 PM   Result Value Ref Range    WBC 12.0 (H) 3.6 - 11.0 K/uL    RBC 3.54 (L) 3.80 - 5.20 M/uL    HGB 8.9 (L) 11.5 - 16.0 g/dL    HCT 28.2 (L) 35.0 - 47.0 %    MCV 79.7 (L) 80.0 - 99.0 FL    MCH 25.1 (L) 26.0 - 34.0 PG    MCHC 31.6 30.0 - 36.5 g/dL    RDW 15.3 (H) 11.5 - 14.5 %    PLATELET 914 (H) 598 - 400 K/uL    NEUTROPHILS 72 32 - 75 %    LYMPHOCYTES 12 12 - 49 %    MONOCYTES 11 5 - 13 %    EOSINOPHILS 5 0 - 7 %    BASOPHILS 0 0 - 1 %    ABS. NEUTROPHILS 8.6 (H) 1.8 - 8.0 K/UL    ABS. LYMPHOCYTES 1.5 0.8 - 3.5 K/UL    ABS. MONOCYTES 1.4 (H) 0.0 - 1.0 K/UL    ABS. EOSINOPHILS 0.6 (H) 0.0 - 0.4 K/UL    ABS. BASOPHILS 0.0 0.0 - 0.1 K/UL   METABOLIC PANEL, COMPREHENSIVE    Collection Time: 07/14/17 12:56 PM   Result Value Ref Range    Sodium 137 136 - 145 mmol/L    Potassium 3.4 (L) 3.5 - 5.1 mmol/L    Chloride 103 97 - 108 mmol/L    CO2 25 21 - 32 mmol/L    Anion gap 9 5 - 15 mmol/L    Glucose 150 (H) 65 - 100 mg/dL    BUN 2 (L) 6 - 20 MG/DL    Creatinine 0.59 0.55 - 1.02 MG/DL    BUN/Creatinine ratio 3 (L) 12 - 20      GFR est AA >60 >60 ml/min/1.73m2    GFR est non-AA >60 >60 ml/min/1.73m2    Calcium 7.9 (L) 8.5 - 10.1 MG/DL    Bilirubin, total 0.2 0.2 - 1.0 MG/DL    ALT (SGPT) 13 12 - 78 U/L    AST (SGOT) 6 (L) 15 - 37 U/L    Alk. phosphatase 113 45 - 117 U/L    Protein, total 6.0 (L) 6.4 - 8.2 g/dL    Albumin 2.2 (L) 3.5 - 5.0 g/dL    Globulin 3.8 2.0 - 4.0 g/dL    A-G Ratio 0.6 (L) 1.1 - 2.2     TOTAL HCG, QT.     Collection Time: 07/14/17 12:56 PM   Result Value Ref [place] [address] (H) 0 - 6 MIU/ML   AMYLASE    Collection Time: 07/14/17 12:56 PM   Result Value Ref Range    Amylase 14 (L) 25 - 115 U/L   C REACTIVE PROTEIN, QT    Collection Time: 07/14/17 12:56 PM   Result Value Ref Range    C-Reactive protein 10.70 (H) 0.00 - 0.60 mg/dL   LIPASE    Collection Time: 07/14/17 12:56 PM   Result Value Ref Range    Lipase 33 (L) 73 - 393 U/L   URINALYSIS W/MICROSCOPIC    Collection Time: 07/14/17  3:26 PM   Result Value Ref Range    Color DARK YELLOW      Appearance CLEAR CLEAR      Specific gravity 1.025 1.003 - 1.030      pH (UA) 6.0 5.0 - 8.0      Protein 30 (A) NEG mg/dL    Glucose NEGATIVE  NEG mg/dL    Ketone TRACE (A) NEG mg/dL    Bilirubin NEGATIVE  NEG      Blood NEGATIVE  NEG      Urobilinogen 0.2 0.2 - 1.0 EU/dL    Nitrites NEGATIVE  NEG      Leukocyte Esterase NEGATIVE  NEG      WBC 5-10 0 - 4 /hpf    RBC 0-5 0 - 5 /hpf    Epithelial cells FEW FEW /lpf    Bacteria NEGATIVE  NEG /hpf    Mucus 3+ (A) NEG /lpf    CA Oxalate crystals FEW (A) NEG     CBC WITH AUTOMATED DIFF    Collection Time: 07/15/17  6:14 AM   Result Value Ref Range    WBC 9.7 3.6 - 11.0 K/uL    RBC 3.07 (L) 3.80 - 5.20 M/uL    HGB 7.5 (L) 11.5 - 16.0 g/dL    HCT 24.3 (L) 35.0 - 47.0 %    MCV 79.2 (L) 80.0 - 99.0 FL    MCH 24.4 (L) 26.0 - 34.0 PG    MCHC 30.9 30.0 - 36.5 g/dL    RDW 15.5 (H) 11.5 - 14.5 %    PLATELET 013 456 - 670 K/uL    NEUTROPHILS 63 32 - 75 %    LYMPHOCYTES 18 12 - 49 %    MONOCYTES 12 5 - 13 %    EOSINOPHILS 7 0 - 7 %    BASOPHILS 0 0 - 1 %    ABS. NEUTROPHILS 6.1 1.8 - 8.0 K/UL    ABS. LYMPHOCYTES 1.8 0.8 - 3.5 K/UL    ABS. MONOCYTES 1.2 (H) 0.0 - 1.0 K/UL    ABS. EOSINOPHILS 0.7 (H) 0.0 - 0.4 K/UL    ABS.  BASOPHILS 0.0 0.0 - 0.1 K/UL   METABOLIC PANEL, COMPREHENSIVE    Collection Time: 07/15/17  6:14 AM   Result Value Ref Range    Sodium 140 136 - 145 mmol/L    Potassium 3.2 (L) 3.5 - 5.1 mmol/L    Chloride 109 (H) 97 - 108 mmol/L    CO2 24 21 - 32 mmol/L    Anion gap 7 5 - 15 mmol/L    Glucose 86 65 - 100 mg/dL    BUN 2 (L) 6 - 20 MG/DL    Creatinine 0.41 (L) 0.55 - 1.02 MG/DL    BUN/Creatinine ratio 5 (L) 12 - 20      GFR est AA >60 >60 ml/min/1.73m2    GFR est non-AA >60 >60 ml/min/1.73m2    Calcium 7.1 (L) 8.5 - 10.1 MG/DL    Bilirubin, total 0.2 0.2 - 1.0 MG/DL    ALT (SGPT) 10 (L) 12 - 78 U/L    AST (SGOT) 4 (L) 15 - 37 U/L    Alk. phosphatase 98 45 - 117 U/L    Protein, total 5.2 (L) 6.4 - 8.2 g/dL    Albumin 1.9 (L) 3.5 - 5.0 g/dL    Globulin 3.3 2.0 - 4.0 g/dL    A-G Ratio 0.6 (L) 1.1 - 2.2         Assessment and Plan: Active Problems:    Crohn's disease ([facility] [address] (2017)      Hyperemesis arising during pregnancy (2017)      Previous  section (2017)       . 21 WF  EGA 17 wks 4 days with hyperemesis and likely Crohn's flare from suboptimal management.   --Admit to [facility]  --IVF, correction of electrolytes  --Antiemetics as needed  --GI consult called; Dr. Devon Cheek will see patient in today    --Labs pending include c.diff, stool culture, amylase/lipase/CRP  --Repeat CBC/CMP done this am   --If patient has fever or if WBC is continuing to increase we will start on empiric antibiotics (Dr. Devon Cheek recommends Augmentin, as Cipro is contraindicated in pregnancy)  --Dr. Eduardo Lazaro discussed with patient and significant other the recommendation to be on maintenance meds for her Crohn's;   --Consult with GI to establish the safest treatment course during pregnancy  --Pt is interested in delivering here at [facility] memories are difficult for them) and would like to be given names of OB groups with privileges @ [facility] for ongoing obstetric care    Signed By: Laura Jaquez MD     July 15, 2017

## 2017-07-15 NOTE — CONSULTS
[address], [address] [place] [address]              Gastroenterology Consult     Referring Physician: Dr. Johanne Mcleod    Consult Date: 7/15/2017     Subjective:     Chief Complaint: nausea/vomiting, bloody diarrhea    History of Present Illness: Natasha Thao is a 24 y.o. female with hx of ulcerative colitis diagnosed in 2015 who is 17 weeks pregnant presents with persistent n/v and bloody diarrhea. She reports n/v for most of her pregnancy. Diarrhea started a few weeks ago and has been bloody for the past 3 days. She also reports epigastric pain radiating into the lower abdomen. Symptoms have been getting worse, especially the pain, so she came into the ER. She presented with similar symptoms in her 7th week of pregnancy and symptoms improved with empiric antibiotics. Abd u/s showed no gallstones or biliary dilatation. It did showed ascending colitis. In 2015, she had a colonoscopy with Dr Sela Lennox which showed diffuse inflammation with biopsies revealing chronic active colitis consistent with ulcerative colitis. She was started on Lialda which she stopped when she got pregnant last year. She states that her symptoms flared during that pregnancy as well and she was given Balsalazide. She reports no benefit, partially because of hyperemesis and inability to keep pills down. She lost the baby in December. She reports some diarrhea in 1/2017 that she managed with Imodium. She reports symptoms resolved after her pregnancy, so she did not start anything for her ulcerative colitis. Her symptoms started again when she became pregnant again. She was admitted in 5/2017 with similar symptoms in her 7th week of pregnancy and symptoms improved with empiric antibiotics.  She refused to take any medications of her ulcerative colitis during that admission because she was concerned abouts its effects on pregnancy.       Past Medical History:   Diagnosis Date    Crohn's colitis ([facility] [address]  H/O  section     PCOS (polycystic ovarian syndrome)     Placental abruption      Past Surgical History:   Procedure Laterality Date     DELIVERY ONLY      HX  SECTION      x 1      History reviewed. No pertinent family history. Social History   Substance Use Topics    Smoking status: Former Smoker    Smokeless tobacco: Never Used    Alcohol use No      Allergies   Allergen Reactions    Codeine Anaphylaxis     Current Facility-Administered Medications   Medication Dose Route Frequency    sodium chloride (NS) flush 5-10 mL  5-10 mL IntraVENous PRN    sodium chloride (NS) flush 5-10 mL  5-10 mL IntraVENous Q8H    dextrose 5% and 0.9% NaCl infusion  125 mL/hr IntraVENous CONTINUOUS    acetaminophen (TYLENOL) tablet 650 mg  650 mg Oral Q4H PRN    ondansetron (ZOFRAN) injection 4 mg  4 mg IntraVENous Q4H PRN    prenatal vit-iron fumarate-fa (PRENATAL PLUS with IRON) tablet 1 Tab  1 Tab Oral DAILY    pantoprazole (PROTONIX) 40 mg in sodium chloride 0.9 % 10 mL injection  40 mg IntraVENous Q24H        Review of Systems:  Negative except HPI      Objective:     Physical Exam:  Visit Vitals    /46 (BP 1 Location: Right arm)    Pulse 88    Temp 98.4 °F (36.9 °C)    Resp 16    Ht 5' (1.524 m)    Wt 54.4 kg (120 lb)    SpO2 99%    Breastfeeding No    BMI 23.44 kg/m2        Skin:  Extremities and face reveal no rashes. No carlson erythema. No telangiectasias on the chest wall. HEENT: Sclerae anicteric. No oral ulcers. No abnormal pigmentation of the lips. The neck is supple. Cardiovascular: Regular rate and rhythm. Respiratory:  Comfortable breathing with no accessory muscle use. Clear breath sounds with no wheezes, rales, or rhonchi. GI:  Abdomen nondistended, soft, and mild diffuse tenderness. Normal active bowel sounds. No masses palpable. Rectal:  Deferred  Musculoskeletal:  No pitting edema of the lower legs. Neurological:  Gross memory appears intact. Patient is alert and oriented. Psychiatric:  Mood appears appropriate with judgement intact. Laboratory:    Recent Results (from the past 24 hour(s))   C. DIFFICILE (DNA)    Collection Time: 07/15/17  4:01 AM   Result Value Ref Range    C. difficile (DNA) NEGATIVE  NEG     CBC WITH AUTOMATED DIFF    Collection Time: 07/15/17  6:14 AM   Result Value Ref Range    WBC 9.7 3.6 - 11.0 K/uL    RBC 3.07 (L) 3.80 - 5.20 M/uL    HGB 7.5 (L) 11.5 - 16.0 g/dL    HCT 24.3 (L) 35.0 - 47.0 %    MCV 79.2 (L) 80.0 - 99.0 FL    MCH 24.4 (L) 26.0 - 34.0 PG    MCHC 30.9 30.0 - 36.5 g/dL    RDW 15.5 (H) 11.5 - 14.5 %    PLATELET 714 798 - 128 K/uL    NEUTROPHILS 63 32 - 75 %    LYMPHOCYTES 18 12 - 49 %    MONOCYTES 12 5 - 13 %    EOSINOPHILS 7 0 - 7 %    BASOPHILS 0 0 - 1 %    ABS. NEUTROPHILS 6.1 1.8 - 8.0 K/UL    ABS. LYMPHOCYTES 1.8 0.8 - 3.5 K/UL    ABS. MONOCYTES 1.2 (H) 0.0 - 1.0 K/UL    ABS. EOSINOPHILS 0.7 (H) 0.0 - 0.4 K/UL    ABS. BASOPHILS 0.0 0.0 - 0.1 K/UL   METABOLIC PANEL, COMPREHENSIVE    Collection Time: 07/15/17  6:14 AM   Result Value Ref Range    Sodium 140 136 - 145 mmol/L    Potassium 3.2 (L) 3.5 - 5.1 mmol/L    Chloride 109 (H) 97 - 108 mmol/L    CO2 24 21 - 32 mmol/L    Anion gap 7 5 - 15 mmol/L    Glucose 86 65 - 100 mg/dL    BUN 2 (L) 6 - 20 MG/DL    Creatinine 0.41 (L) 0.55 - 1.02 MG/DL    BUN/Creatinine ratio 5 (L) 12 - 20      GFR est AA >60 >60 ml/min/1.73m2    GFR est non-AA >60 >60 ml/min/1.73m2    Calcium 7.1 (L) 8.5 - 10.1 MG/DL    Bilirubin, total 0.2 0.2 - 1.0 MG/DL    ALT (SGPT) 10 (L) 12 - 78 U/L    AST (SGOT) 4 (L) 15 - 37 U/L    Alk. phosphatase 98 45 - 117 U/L    Protein, total 5.2 (L) 6.4 - 8.2 g/dL    Albumin 1.9 (L) 3.5 - 5.0 g/dL    Globulin 3.3 2.0 - 4.0 g/dL    A-G Ratio 0.6 (L) 1.1 - 2.2       Limited abd US 7/14/17 - IMPRESSION:   1. Evidence of ascending colitis. No abscess. 2. Contracted gallbladder. No evidence of acute cholecystitis. No abscess.     Assessment/Plan:   Patient with Ulcerative pancolitis who is 17 wks pregnant with abdominal pain, bloody diarrhea and n/v. C diff is negative, Stool culture is pending. Symptoms most likely 2/2 hyperemesis 2/2 pregnancy and ulcerative colitis flare. After a long discussion, patient is willing to start treatment for Ulcerative colitis. Discussed the option of Sulfasalazine with folic acid and possible short course of steroids; however, given hyperemesis, unclear if she would get any benefit. Biologic such as Remicaide, Humira and Cimzia were discussed with the patient including the need to stop at 30wk of pregnancy until delivery. Risks and benefits of these medications were discussed at length.   - F/u stool cultures  - Continue PPI and Zofran  - start empiric IV antibitoics  - Hep B serologies and TB testing ordered  - If symptoms improve with antibiotics, will consider outpt initiation of a biologic

## 2017-07-16 LAB
ANTIBODY SCREEN, EXTERNAL: POSITIVE
BASOPHILS # BLD AUTO: 0 K/UL (ref 0–0.1)
BASOPHILS # BLD: 0 % (ref 0–1)
DIFFERENTIAL METHOD BLD: ABNORMAL
EOSINOPHIL # BLD: 1.1 K/UL (ref 0–0.4)
EOSINOPHIL NFR BLD: 9 % (ref 0–7)
ERYTHROCYTE [DISTWIDTH] IN BLOOD BY AUTOMATED COUNT: 15.5 % (ref 11.5–14.5)
HBSAG, EXTERNAL: NEGATIVE
HCT VFR BLD AUTO: 21.7 % (ref 35–47)
HGB BLD-MCNC: 6.9 G/DL (ref 11.5–16)
LYMPHOCYTES # BLD AUTO: 15 % (ref 12–49)
LYMPHOCYTES # BLD: 1.9 K/UL (ref 0.8–3.5)
MCH RBC QN AUTO: 25.2 PG (ref 26–34)
MCHC RBC AUTO-ENTMCNC: 31.8 G/DL (ref 30–36.5)
MCV RBC AUTO: 79.2 FL (ref 80–99)
MONOCYTES # BLD: 1.2 K/UL (ref 0–1)
MONOCYTES NFR BLD AUTO: 10 % (ref 5–13)
NEUTS BAND NFR BLD MANUAL: 3 % (ref 0–6)
NEUTS SEG # BLD: 8.2 K/UL (ref 1.8–8)
NEUTS SEG NFR BLD AUTO: 63 % (ref 32–75)
PLATELET # BLD AUTO: 328 K/UL (ref 150–400)
RBC # BLD AUTO: 2.74 M/UL (ref 3.8–5.2)
RBC MORPH BLD: ABNORMAL
TYPE, ABO & RH, EXTERNAL: NORMAL
WBC # BLD AUTO: 12.4 K/UL (ref 3.6–11)
WBC MORPH BLD: ABNORMAL

## 2017-07-16 PROCEDURE — 65410000002 HC RM PRIVATE OB

## 2017-07-16 PROCEDURE — 86921 COMPATIBILITY TEST INCUBATE: CPT | Performed by: OBSTETRICS & GYNECOLOGY

## 2017-07-16 PROCEDURE — P9016 RBC LEUKOCYTES REDUCED: HCPCS | Performed by: OBSTETRICS & GYNECOLOGY

## 2017-07-16 PROCEDURE — 30233N1 TRANSFUSION OF NONAUTOLOGOUS RED BLOOD CELLS INTO PERIPHERAL VEIN, PERCUTANEOUS APPROACH: ICD-10-PCS | Performed by: OBSTETRICS & GYNECOLOGY

## 2017-07-16 PROCEDURE — 86920 COMPATIBILITY TEST SPIN: CPT | Performed by: OBSTETRICS & GYNECOLOGY

## 2017-07-16 PROCEDURE — 74011000258 HC RX REV CODE- 258: Performed by: INTERNAL MEDICINE

## 2017-07-16 PROCEDURE — 74011250636 HC RX REV CODE- 250/636: Performed by: OBSTETRICS & GYNECOLOGY

## 2017-07-16 PROCEDURE — 74011000250 HC RX REV CODE- 250: Performed by: OBSTETRICS & GYNECOLOGY

## 2017-07-16 PROCEDURE — 74011250637 HC RX REV CODE- 250/637: Performed by: OBSTETRICS & GYNECOLOGY

## 2017-07-16 PROCEDURE — 86705 HEP B CORE ANTIBODY IGM: CPT | Performed by: INTERNAL MEDICINE

## 2017-07-16 PROCEDURE — C9113 INJ PANTOPRAZOLE SODIUM, VIA: HCPCS | Performed by: OBSTETRICS & GYNECOLOGY

## 2017-07-16 PROCEDURE — 85025 COMPLETE CBC W/AUTO DIFF WBC: CPT | Performed by: INTERNAL MEDICINE

## 2017-07-16 PROCEDURE — 86922 COMPATIBILITY TEST ANTIGLOB: CPT | Performed by: OBSTETRICS & GYNECOLOGY

## 2017-07-16 PROCEDURE — 74011000258 HC RX REV CODE- 258: Performed by: OBSTETRICS & GYNECOLOGY

## 2017-07-16 PROCEDURE — 87340 HEPATITIS B SURFACE AG IA: CPT | Performed by: INTERNAL MEDICINE

## 2017-07-16 PROCEDURE — 86870 RBC ANTIBODY IDENTIFICATION: CPT | Performed by: OBSTETRICS & GYNECOLOGY

## 2017-07-16 PROCEDURE — 74011250636 HC RX REV CODE- 250/636: Performed by: INTERNAL MEDICINE

## 2017-07-16 PROCEDURE — 77030018846 HC SOL IRR STRL H20 ICUM -A

## 2017-07-16 PROCEDURE — 86900 BLOOD TYPING SEROLOGIC ABO: CPT | Performed by: OBSTETRICS & GYNECOLOGY

## 2017-07-16 PROCEDURE — 86480 TB TEST CELL IMMUN MEASURE: CPT | Performed by: INTERNAL MEDICINE

## 2017-07-16 PROCEDURE — 77030029131 HC ADMN ST IV BLD N DEHP ICUM -B

## 2017-07-16 PROCEDURE — 86706 HEP B SURFACE ANTIBODY: CPT | Performed by: INTERNAL MEDICINE

## 2017-07-16 PROCEDURE — 36415 COLL VENOUS BLD VENIPUNCTURE: CPT | Performed by: INTERNAL MEDICINE

## 2017-07-16 PROCEDURE — 36430 TRANSFUSION BLD/BLD COMPNT: CPT

## 2017-07-16 PROCEDURE — 86644 CMV ANTIBODY: CPT | Performed by: OBSTETRICS & GYNECOLOGY

## 2017-07-16 RX ORDER — ACETAMINOPHEN 325 MG/1
650 TABLET ORAL
Status: DISCONTINUED | OUTPATIENT
Start: 2017-07-16 | End: 2017-07-16 | Stop reason: SDUPTHER

## 2017-07-16 RX ORDER — OXYCODONE AND ACETAMINOPHEN 5; 325 MG/1; MG/1
1 TABLET ORAL
Status: DISCONTINUED | OUTPATIENT
Start: 2017-07-16 | End: 2017-07-20 | Stop reason: HOSPADM

## 2017-07-16 RX ORDER — ACETAMINOPHEN 325 MG/1
650 TABLET ORAL
Status: DISCONTINUED | OUTPATIENT
Start: 2017-07-16 | End: 2017-07-20 | Stop reason: HOSPADM

## 2017-07-16 RX ORDER — POTASSIUM CHLORIDE, DEXTROSE MONOHYDRATE AND SODIUM CHLORIDE 300; 5; 900 MG/100ML; G/100ML; MG/100ML
INJECTION, SOLUTION INTRAVENOUS CONTINUOUS
Status: DISCONTINUED | OUTPATIENT
Start: 2017-07-16 | End: 2017-07-16 | Stop reason: SDUPTHER

## 2017-07-16 RX ORDER — OXYCODONE AND ACETAMINOPHEN 5; 325 MG/1; MG/1
2 TABLET ORAL
Status: DISCONTINUED | OUTPATIENT
Start: 2017-07-16 | End: 2017-07-20 | Stop reason: HOSPADM

## 2017-07-16 RX ORDER — SODIUM CHLORIDE 9 MG/ML
250 INJECTION, SOLUTION INTRAVENOUS AS NEEDED
Status: DISCONTINUED | OUTPATIENT
Start: 2017-07-16 | End: 2017-07-20 | Stop reason: HOSPADM

## 2017-07-16 RX ADMIN — Medication 10 ML: at 21:43

## 2017-07-16 RX ADMIN — OXYCODONE HYDROCHLORIDE AND ACETAMINOPHEN 2 TABLET: 5; 325 TABLET ORAL at 15:21

## 2017-07-16 RX ADMIN — ONDANSETRON 4 MG: 2 INJECTION INTRAMUSCULAR; INTRAVENOUS at 21:43

## 2017-07-16 RX ADMIN — PIPERACILLIN AND TAZOBACTAM 3.38 G: 3; .375 INJECTION, POWDER, FOR SOLUTION INTRAVENOUS at 13:42

## 2017-07-16 RX ADMIN — OXYCODONE HYDROCHLORIDE AND ACETAMINOPHEN 2 TABLET: 5; 325 TABLET ORAL at 21:43

## 2017-07-16 RX ADMIN — PIPERACILLIN AND TAZOBACTAM 3.38 G: 3; .375 INJECTION, POWDER, FOR SOLUTION INTRAVENOUS at 01:23

## 2017-07-16 RX ADMIN — Medication 10 ML: at 05:27

## 2017-07-16 RX ADMIN — ONDANSETRON 4 MG: 2 INJECTION INTRAMUSCULAR; INTRAVENOUS at 05:27

## 2017-07-16 RX ADMIN — ACETAMINOPHEN 650 MG: 325 TABLET, FILM COATED ORAL at 05:27

## 2017-07-16 RX ADMIN — PIPERACILLIN AND TAZOBACTAM 3.38 G: 3; .375 INJECTION, POWDER, FOR SOLUTION INTRAVENOUS at 22:35

## 2017-07-16 RX ADMIN — SODIUM CHLORIDE 40 MG: 9 INJECTION INTRAMUSCULAR; INTRAVENOUS; SUBCUTANEOUS at 20:23

## 2017-07-16 RX ADMIN — POTASSIUM CHLORIDE: 2 INJECTION, SOLUTION, CONCENTRATE INTRAVENOUS at 13:45

## 2017-07-16 RX ADMIN — SODIUM CHLORIDE 250 ML: 900 INJECTION, SOLUTION INTRAVENOUS at 15:57

## 2017-07-16 RX ADMIN — POTASSIUM CHLORIDE: 2 INJECTION, SOLUTION, CONCENTRATE INTRAVENOUS at 22:35

## 2017-07-16 RX ADMIN — PIPERACILLIN AND TAZOBACTAM 3.38 G: 3; .375 INJECTION, POWDER, FOR SOLUTION INTRAVENOUS at 06:28

## 2017-07-16 RX ADMIN — Medication 10 ML: at 05:28

## 2017-07-16 RX ADMIN — ONDANSETRON 4 MG: 2 INJECTION INTRAMUSCULAR; INTRAVENOUS at 15:21

## 2017-07-16 NOTE — PROGRESS NOTES
0730  Bedside and Verbal shift change report given to SALLY Nowak RN (oncoming nurse) by Yair Pemberton (offgoing nurse). Report included the following information SBAR, Kardex, Intake/Output, MAR and Recent Results. 1100  Pt states she\" is still having frequent stools but less amounts at a time. She feels slightly better\"  Hourly rounds 2046-7627  Hourly rounds 3259-4972  1526  Mews score 3 due to pulse 120, and pt is in pain, Dr. Massimo Bowden notified and orders received. 1559  Blood transfusion started. 1830  Pt states pain 3/10 and is feeling better. 1844  Blood transfusion completed, pt tolerated well.   Hourly rounds 8752-7636

## 2017-07-16 NOTE — PROGRESS NOTES
High Risk Obstetrics Progress Note    Name: Chin Champagne MRN: [id number]  SSN: [id number]    YOB: 1995  Age: 24 y.o. Sex: female      Subjective:      LOS: 2 days    Estimated Date of Delivery: 17   Gestational Age Today: 17w9d     Patient admitted for nausea/vomiting/bloody diarrhea/abdominal pain related to both hyperemesis gravidarum and Crohn's flare. Patient states she is still having frequent diarrhea and vomiting. She does feel that the volume of the diarrhea is less. She states that every time she has a wave of the abdominal pain it is shortly thereafter followed by vomiting/diarrhea. She does have an appetite and is eating regular food (onion rings, spaghettio's, etc). She denies any obstetric complaints such as absent FM, LOF, VB, changes in vaginal discharge, contractions. Objective:     Vitals:  Blood pressure 102/50, pulse 92, temperature 98.7 °F (37.1 °C), resp. rate 18, height 5' (1.524 m), weight 54.4 kg (120 lb), SpO2 99 %, not currently breastfeeding. Temp (24hrs), Av.7 °F (37.1 °C), Min:98.3 °F (36.8 °C), Max:99.1 °F (50.5 °C)    Systolic (41ALS), XNU:999 , Min:102 , HZB:520      Diastolic (75KOB), NZX:08, Min:50, Max:66       Intake and Output:          Physical Exam:  Patient without distress. Abdomen: soft, nontender  Lower Extremities:  - Edema No; trace pedal edema present bilaterally    Membranes:  Intact    Uterine Activity:  None    Fetal Heart Rate:  FHTs present        Labs:   Recent Results (from the past 36 hour(s))   C.  DIFFICILE (DNA)    Collection Time: 07/15/17  4:01 AM   Result Value Ref Range    C. difficile (DNA) NEGATIVE  NEG     CULTURE, STOOL    Collection Time: 07/15/17  4:02 AM   Result Value Ref Range    Special Requests: NO SPECIAL REQUESTS      Campylobacter antigen NEGATIVE      Culture result:        NO ROUTINE ENTERIC PATHOGENS ISOLATED INCLUDING SALMONELLA, SHIGELLA, YERSINIA, VIBRIO OR SHIGA TOXIN PRODUCING E. COLI SO FAR CBC WITH AUTOMATED DIFF    Collection Time: 07/15/17  6:14 AM   Result Value Ref Range    WBC 9.7 3.6 - 11.0 K/uL    RBC 3.07 (L) 3.80 - 5.20 M/uL    HGB 7.5 (L) 11.5 - 16.0 g/dL    HCT 24.3 (L) 35.0 - 47.0 %    MCV 79.2 (L) 80.0 - 99.0 FL    MCH 24.4 (L) 26.0 - 34.0 PG    MCHC 30.9 30.0 - 36.5 g/dL    RDW 15.5 (H) 11.5 - 14.5 %    PLATELET 542 721 - 752 K/uL    NEUTROPHILS 63 32 - 75 %    LYMPHOCYTES 18 12 - 49 %    MONOCYTES 12 5 - 13 %    EOSINOPHILS 7 0 - 7 %    BASOPHILS 0 0 - 1 %    ABS. NEUTROPHILS 6.1 1.8 - 8.0 K/UL    ABS. LYMPHOCYTES 1.8 0.8 - 3.5 K/UL    ABS. MONOCYTES 1.2 (H) 0.0 - 1.0 K/UL    ABS. EOSINOPHILS 0.7 (H) 0.0 - 0.4 K/UL    ABS. BASOPHILS 0.0 0.0 - 0.1 K/UL   METABOLIC PANEL, COMPREHENSIVE    Collection Time: 07/15/17  6:14 AM   Result Value Ref Range    Sodium 140 136 - 145 mmol/L    Potassium 3.2 (L) 3.5 - 5.1 mmol/L    Chloride 109 (H) 97 - 108 mmol/L    CO2 24 21 - 32 mmol/L    Anion gap 7 5 - 15 mmol/L    Glucose 86 65 - 100 mg/dL    BUN 2 (L) 6 - 20 MG/DL    Creatinine 0.41 (L) 0.55 - 1.02 MG/DL    BUN/Creatinine ratio 5 (L) 12 - 20      GFR est AA >60 >60 ml/min/1.73m2    GFR est non-AA >60 >60 ml/min/1.73m2    Calcium 7.1 (L) 8.5 - 10.1 MG/DL    Bilirubin, total 0.2 0.2 - 1.0 MG/DL    ALT (SGPT) 10 (L) 12 - 78 U/L    AST (SGOT) 4 (L) 15 - 37 U/L    Alk.  phosphatase 98 45 - 117 U/L    Protein, total 5.2 (L) 6.4 - 8.2 g/dL    Albumin 1.9 (L) 3.5 - 5.0 g/dL    Globulin 3.3 2.0 - 4.0 g/dL    A-G Ratio 0.6 (L) 1.1 - 2.2     CBC WITH AUTOMATED DIFF    Collection Time: 07/16/17  5:14 AM   Result Value Ref Range    WBC 12.4 (H) 3.6 - 11.0 K/uL    RBC 2.74 (L) 3.80 - 5.20 M/uL    HGB 6.9 (L) 11.5 - 16.0 g/dL    HCT 21.7 (L) 35.0 - 47.0 %    MCV 79.2 (L) 80.0 - 99.0 FL    MCH 25.2 (L) 26.0 - 34.0 PG    MCHC 31.8 30.0 - 36.5 g/dL    RDW 15.5 (H) 11.5 - 14.5 %    PLATELET 802 203 - 202 K/uL    NEUTROPHILS 63 32 - 75 %    BAND NEUTROPHILS 3 0 - 6 %    LYMPHOCYTES 15 12 - 49 %    MONOCYTES 10 5 - 13 %    EOSINOPHILS 9 (H) 0 - 7 %    BASOPHILS 0 0 - 1 %    ABS. NEUTROPHILS 8.2 (H) 1.8 - 8.0 K/UL    ABS. LYMPHOCYTES 1.9 0.8 - 3.5 K/UL    ABS. MONOCYTES 1.2 (H) 0.0 - 1.0 K/UL    ABS. EOSINOPHILS 1.1 (H) 0.0 - 0.4 K/UL    ABS. BASOPHILS 0.0 0.0 - 0.1 K/UL    DF MANUAL      RBC COMMENTS ANISOCYTOSIS  1+        RBC COMMENTS MICROCYTOSIS  1+        RBC COMMENTS HYPOCHROMIA  1+        RBC COMMENTS POLYCHROMASIA  1+        WBC COMMENTS TOXIC GRANULATION     TYPE & CROSSMATCH    Collection Time: 17 12:31 PM   Result Value Ref Range    Crossmatch Expiration 2017     ABO/Rh(D) A NEGATIVE     Antibody screen POS     Physician instructions CMV NEGATIVE     Antibody ID Anti-D passively acquired     Unit number [id number]     Blood component type RC LR AS1     Unit division 00     Status of unit ALLOCATED     Crossmatch result Compatible     Unit number [id number]     Blood component type RC LR AS1,2     Unit division 00     Status of unit ALLOCATED     Crossmatch result Compatible        Assessment and Plan:       Active Problems:    Crohn's disease ([facility] [address] (2017)      Hyperemesis arising during pregnancy (2017)      Previous  section (2017)       Plan:  --stool culture still pending; no growth so far; C.diff negative  --discussed with patient risks/benefits of blood transfusion; she is in agreement with being transfused; will transfuse 1 unit PRBCs  --will add K to IVFs; oral repletion unlikely to be effective given frequency of episodes of emesis  --corrected calcium = 8.78  --appreciate GI following along with us; pt is amenable now to starting on maintenance Crohn's meds; per pt this will start as an outpatient plus/minus addition of steroids; a course of steroids may also be helpful in treatment of the hyperemesis if this proves to be unrelenting  --continue empiric Zosyn for Crohn's flare and follow WBC and clinical response  --patient given names/numbers of [facility] OB providers ([phone] Sameera Hayden, VPFW, KATY) to research and with whom she may opt to establish care  --continue PPI/Zofran  --follow up labs    Signed By: Nancy Bailon MD     July 16, 2017

## 2017-07-16 NOTE — PROGRESS NOTES
Bedside shift change report given to CECIL Jones RN (oncoming nurse) by [employer]. Cait Barrios RN (offgoing nurse). Report included the following information SBAR, Kardex, Intake/Output, MAR and Recent Results. 2325: Spoke with Jatin Jeffrye Pharmacist regarding Zosyn order. Per Pharmacist, dose and frequency is correct and to administer as ordered. 4444-6529: Hourly rounds completed. 2248-7188: Hourly rounds completed. 7981-1428: Hourly rounds completed.

## 2017-07-16 NOTE — PROGRESS NOTES
118 [facility].  217 [facility] 140 [address], [address]   [phone]                GI PROGRESS NOTE      NAME:   Veneda Severance   :    1995   MRN:    [id number]     Assessment/Plan   Patient with Ulcerative pancolitis who is 17 wks pregnant with abdominal pain, bloody diarrhea and n/v. C diff is negative, Stool culture is pending. Symptoms most likely 2/2 hyperemesis 2/2 pregnancy and ulcerative colitis flare. After a long discussion, patient is willing to start treatment for Ulcerative colitis. Discussed the option of Sulfasalazine with folic acid and possible short course of steroids; however, given hyperemesis, unclear if she would get any benefit. Biologic such as Remicaide, Humira and Cimzia were discussed with the patient including the need to stop at 30wk of pregnancy until delivery. Risks and benefits of these medications were discussed at length. - F/u stool cultures  - Continue PPI and Zofran  - IV antibitoics started yesterday  - Hep B serologies and TB testing ordered  - If symptoms improve with antibiotics, will consider outpt initiation of a biologic    Anemia - H&H trending down  - Transfuse 1 unit today if ok with OB     Patient Active Problem List   Diagnosis Code    [facility] K50.90    Hyperemesis arising during pregnancy O21.0    Previous  section N25.303       Subjective:     Continued nausea and vomiting. Abdominal pain a little better.  10 loose BMs overngiht - less bloody per patient    Review of Systems    Constitutional: negative fever, negative chills, negative weight loss  Eyes:   negative visual changes  ENT:   negative sore throat, tongue or lip swelling  Respiratory:  negative cough, negative dyspnea  Cards:  negative for chest pain, palpitations, lower extremity edema  GI:   See HPI  :  negative for frequency, dysuria  Integument:  negative for rash and pruritus  Heme:  negative for easy bruising and gum/nose bleeding  Musculoskel: negative for myalgias,  back pain and muscle weakness  Neuro:   negative for headaches, dizziness, vertigo  Psych: negative for feelings of anxiety, depression           Objective:     VITALS:   Last 24hrs VS reviewed since prior hospitalist progress note. Most recent are:  Visit Vitals    /61 (BP 1 Location: Right arm, BP Patient Position: At rest)    Pulse (!) 109    Temp 98.3 °F (36.8 °C)    Resp 16    Ht 5' (1.524 m)    Wt 54.4 kg (120 lb)    SpO2 99%    Breastfeeding No    BMI 23.44 kg/m2       Intake/Output Summary (Last 24 hours) at 07/16/17 0715  Last data filed at 07/16/17 9752   Gross per 24 hour   Intake             4075 ml   Output             1700 ml   Net             2375 ml        PHYSICAL EXAM:  General   well developed, well nourished,in no acute distress  EENT  Normocephalic, Atraumatic, PERRLA, EOMI, sclera clear, pharynx normal  Neck   Supple without nodes or mass. Respiratory   Clear To Auscultation bilaterally - no wheezes, rales, rhonchi, or crackles  Cardiology  Regular Rate and Rythmn  - no murmurs, rubs or gallops  Abdominal  Soft, mild lower abdominal tenderness, non-distended, positive bowel sounds, no palpable mass  Extremities  No clubbing, cyanosis, or edema. Skin  Normal skin turgor. No rashes or skin ulcers noted  Neurological  No focal neurological deficits noted  Psychological  Oriented x 3. Normal affect.        Lab Data   Recent Results (from the past 12 hour(s))   CBC WITH AUTOMATED DIFF    Collection Time: 07/16/17  5:14 AM   Result Value Ref Range    WBC 12.4 (H) 3.6 - 11.0 K/uL    RBC 2.74 (L) 3.80 - 5.20 M/uL    HGB 6.9 (L) 11.5 - 16.0 g/dL    HCT 21.7 (L) 35.0 - 47.0 %    MCV 79.2 (L) 80.0 - 99.0 FL    MCH 25.2 (L) 26.0 - 34.0 PG    MCHC 31.8 30.0 - 36.5 g/dL    RDW 15.5 (H) 11.5 - 14.5 %    PLATELET 573 498 - 023 K/uL    NEUTROPHILS 63 32 - 75 %    BAND NEUTROPHILS 3 0 - 6 %    LYMPHOCYTES 15 12 - 49 %    MONOCYTES 10 5 - 13 % EOSINOPHILS 9 (H) 0 - 7 %    BASOPHILS 0 0 - 1 %    ABS. NEUTROPHILS 8.2 (H) 1.8 - 8.0 K/UL    ABS. LYMPHOCYTES 1.9 0.8 - 3.5 K/UL    ABS. MONOCYTES 1.2 (H) 0.0 - 1.0 K/UL    ABS. EOSINOPHILS 1.1 (H) 0.0 - 0.4 K/UL    ABS.  BASOPHILS 0.0 0.0 - 0.1 K/UL    DF MANUAL      RBC COMMENTS ANISOCYTOSIS  1+        RBC COMMENTS MICROCYTOSIS  1+        RBC COMMENTS HYPOCHROMIA  1+        RBC COMMENTS POLYCHROMASIA  1+        WBC COMMENTS TOXIC GRANULATION           Medications: Reviewed    PMH/SH reviewed - no change compared to H&P  Attending Physician: Marii Rice MD   Date/Time:  7/16/2017

## 2017-07-17 LAB
ANION GAP BLD CALC-SCNC: 8 MMOL/L (ref 5–15)
BACTERIA SPEC CULT: NORMAL
BASOPHILS # BLD AUTO: 0 K/UL (ref 0–0.1)
BASOPHILS # BLD: 0 % (ref 0–1)
BUN SERPL-MCNC: 2 MG/DL (ref 6–20)
BUN/CREAT SERPL: 4 (ref 12–20)
C JEJUNI+C COLI AG STL QL: NEGATIVE
CALCIUM SERPL-MCNC: 7.6 MG/DL (ref 8.5–10.1)
CHLORIDE SERPL-SCNC: 108 MMOL/L (ref 97–108)
CO2 SERPL-SCNC: 23 MMOL/L (ref 21–32)
CREAT SERPL-MCNC: 0.46 MG/DL (ref 0.55–1.02)
DIFFERENTIAL METHOD BLD: ABNORMAL
E COLI SXT1+2 STL IA: NEGATIVE
EOSINOPHIL # BLD: 0.5 K/UL (ref 0–0.4)
EOSINOPHIL NFR BLD: 4 % (ref 0–7)
ERYTHROCYTE [DISTWIDTH] IN BLOOD BY AUTOMATED COUNT: 16 % (ref 11.5–14.5)
GLUCOSE SERPL-MCNC: 92 MG/DL (ref 65–100)
HBV CORE IGM SER QL: NONREACTIVE
HBV SURFACE AB SER QL: NONREACTIVE
HBV SURFACE AB SER-ACNC: <3.1 MIU/ML
HBV SURFACE AG SER QL: <0.1 INDEX
HBV SURFACE AG SER QL: NEGATIVE
HCT VFR BLD AUTO: 29.2 % (ref 35–47)
HGB BLD-MCNC: 9.2 G/DL (ref 11.5–16)
LYMPHOCYTES # BLD AUTO: 29 % (ref 12–49)
LYMPHOCYTES # BLD: 3.3 K/UL (ref 0.8–3.5)
MCH RBC QN AUTO: 25.6 PG (ref 26–34)
MCHC RBC AUTO-ENTMCNC: 31.5 G/DL (ref 30–36.5)
MCV RBC AUTO: 81.3 FL (ref 80–99)
MONOCYTES # BLD: 1 K/UL (ref 0–1)
MONOCYTES NFR BLD AUTO: 9 % (ref 5–13)
NEUTS BAND NFR BLD MANUAL: 13 % (ref 0–6)
NEUTS SEG # BLD: 6.6 K/UL (ref 1.8–8)
NEUTS SEG NFR BLD AUTO: 45 % (ref 32–75)
PLATELET # BLD AUTO: 342 K/UL (ref 150–400)
PLATELET COMMENTS,PCOM: ABNORMAL
POTASSIUM SERPL-SCNC: 3.5 MMOL/L (ref 3.5–5.1)
RBC # BLD AUTO: 3.59 M/UL (ref 3.8–5.2)
RBC MORPH BLD: ABNORMAL
SERVICE CMNT-IMP: NORMAL
SODIUM SERPL-SCNC: 139 MMOL/L (ref 136–145)
WBC # BLD AUTO: 11.4 K/UL (ref 3.6–11)

## 2017-07-17 PROCEDURE — 74011250636 HC RX REV CODE- 250/636: Performed by: INTERNAL MEDICINE

## 2017-07-17 PROCEDURE — 74011000258 HC RX REV CODE- 258: Performed by: INTERNAL MEDICINE

## 2017-07-17 PROCEDURE — 74011250637 HC RX REV CODE- 250/637: Performed by: OBSTETRICS & GYNECOLOGY

## 2017-07-17 PROCEDURE — 77030012890

## 2017-07-17 PROCEDURE — 74011000250 HC RX REV CODE- 250: Performed by: OBSTETRICS & GYNECOLOGY

## 2017-07-17 PROCEDURE — 65410000002 HC RM PRIVATE OB

## 2017-07-17 PROCEDURE — C9113 INJ PANTOPRAZOLE SODIUM, VIA: HCPCS | Performed by: OBSTETRICS & GYNECOLOGY

## 2017-07-17 PROCEDURE — 74011250636 HC RX REV CODE- 250/636: Performed by: OBSTETRICS & GYNECOLOGY

## 2017-07-17 PROCEDURE — 36415 COLL VENOUS BLD VENIPUNCTURE: CPT | Performed by: OBSTETRICS & GYNECOLOGY

## 2017-07-17 PROCEDURE — 80048 BASIC METABOLIC PNL TOTAL CA: CPT | Performed by: OBSTETRICS & GYNECOLOGY

## 2017-07-17 PROCEDURE — 85025 COMPLETE CBC W/AUTO DIFF WBC: CPT | Performed by: OBSTETRICS & GYNECOLOGY

## 2017-07-17 PROCEDURE — 74011000258 HC RX REV CODE- 258: Performed by: OBSTETRICS & GYNECOLOGY

## 2017-07-17 RX ORDER — SODIUM CHLORIDE, SODIUM LACTATE, POTASSIUM CHLORIDE, CALCIUM CHLORIDE 600; 310; 30; 20 MG/100ML; MG/100ML; MG/100ML; MG/100ML
125 INJECTION, SOLUTION INTRAVENOUS CONTINUOUS
Status: DISCONTINUED | OUTPATIENT
Start: 2017-07-17 | End: 2017-07-20 | Stop reason: HOSPADM

## 2017-07-17 RX ORDER — LIDOCAINE HYDROCHLORIDE 10 MG/ML
1 INJECTION, SOLUTION EPIDURAL; INFILTRATION; INTRACAUDAL; PERINEURAL ONCE
Status: DISCONTINUED | OUTPATIENT
Start: 2017-07-17 | End: 2017-07-17 | Stop reason: CLARIF

## 2017-07-17 RX ORDER — LIDOCAINE HYDROCHLORIDE 10 MG/ML
1 INJECTION INFILTRATION; PERINEURAL ONCE
Status: DISPENSED | OUTPATIENT
Start: 2017-07-18 | End: 2017-07-18

## 2017-07-17 RX ORDER — LIDOCAINE HYDROCHLORIDE 10 MG/ML
INJECTION INFILTRATION; PERINEURAL
Status: DISPENSED
Start: 2017-07-17 | End: 2017-07-18

## 2017-07-17 RX ADMIN — ONDANSETRON 4 MG: 2 INJECTION INTRAMUSCULAR; INTRAVENOUS at 06:44

## 2017-07-17 RX ADMIN — SODIUM CHLORIDE 40 MG: 9 INJECTION INTRAMUSCULAR; INTRAVENOUS; SUBCUTANEOUS at 21:54

## 2017-07-17 RX ADMIN — OXYCODONE HYDROCHLORIDE AND ACETAMINOPHEN 1 TABLET: 5; 325 TABLET ORAL at 20:19

## 2017-07-17 RX ADMIN — OXYCODONE HYDROCHLORIDE AND ACETAMINOPHEN 1 TABLET: 5; 325 TABLET ORAL at 15:12

## 2017-07-17 RX ADMIN — ONDANSETRON 4 MG: 2 INJECTION INTRAMUSCULAR; INTRAVENOUS at 21:34

## 2017-07-17 RX ADMIN — Medication 5 ML: at 06:27

## 2017-07-17 RX ADMIN — SODIUM CHLORIDE, SODIUM LACTATE, POTASSIUM CHLORIDE, AND CALCIUM CHLORIDE 125 ML/HR: 600; 310; 30; 20 INJECTION, SOLUTION INTRAVENOUS at 21:20

## 2017-07-17 RX ADMIN — OXYCODONE HYDROCHLORIDE AND ACETAMINOPHEN 2 TABLET: 5; 325 TABLET ORAL at 06:38

## 2017-07-17 RX ADMIN — OXYCODONE HYDROCHLORIDE AND ACETAMINOPHEN 1 TABLET: 5; 325 TABLET ORAL at 19:03

## 2017-07-17 RX ADMIN — METHYLPREDNISOLONE SODIUM SUCCINATE 40 MG: 40 INJECTION, POWDER, FOR SOLUTION INTRAMUSCULAR; INTRAVENOUS at 21:20

## 2017-07-17 RX ADMIN — POTASSIUM CHLORIDE: 2 INJECTION, SOLUTION, CONCENTRATE INTRAVENOUS at 13:21

## 2017-07-17 RX ADMIN — PIPERACILLIN AND TAZOBACTAM 3.38 G: 3; .375 INJECTION, POWDER, FOR SOLUTION INTRAVENOUS at 06:23

## 2017-07-17 RX ADMIN — PIPERACILLIN AND TAZOBACTAM 3.38 G: 3; .375 INJECTION, POWDER, FOR SOLUTION INTRAVENOUS at 22:08

## 2017-07-17 NOTE — PROGRESS NOTES
NUTRITION COMPLETE ASSESSMENT    RECOMMENDATIONS:   1. Continue to monitor wt for trends. Interventions/Plan:   Food/Nutrient Delivery:    Commercial supplement     x2 Ensure Enlive and x 1 Ensure compact daily + snacks     Assessment:   Reason for Assessment:   [x] Provider Consult  []BPA/MST Referral   []LOS   []Reassessment   []NPO/Clear Liquid   []At Nutrition Risk  []Other    Diet:  Mech Soft  Supplements: none  Nutritionally Significant Medications: [x] Reviewed   Meal Intake: No data found. Subjective:  I am hungry. Objective:  24year old, 17w6d admitted for nausea, vomiting, bloody diarrhea and abdominal pain related to hyperemesis and ulcerative pancolitis. C-diff negative. Pt with differing IBD diagnosis of Crohn's vs UC. Pt to have a confirmation scope after discharge and to start Remicade as well. Pt with only 1.7 kg wt increase over the past 2 months. Mary Ann gallegos eating sub sandwich ordered from outside as mechanical soft diet unappetizing. Discussed to adhere to a low fiber diet during inflammatory stage and once feeling better add fiber back into diet slowly. Dr. Christina Sloan came into room and agreed to alter diet to low fiber and agreeable to add high caloire supplements. Pt usually consumes x 1 Ensure daily. Estimated Nutrition Needs:   Kcals/day: 2000 Kcals/day (2000- 2260 kcal/day (MSJ + 400 caloires for pregnancy))  Protein: 73 g (73-84 gm/day ( 1.3-1.5 gm/kg/AW) )  Fluid: 1680 ml  Based On: [place] Jeor  Weight Used: Actual wt    Pt expected to meet estimated nutrient needs:  []   Yes     []  No  [x] Unable to predict at this time    Nutrition Diagnosis:   1. Inadequate protein-energy intake related to altered GI function/ nausea/vomiting  as evidenced by poor intake and slow wt gain. Goals:     Consume 50% meals and x 2 supplements daily x 4-5 day.       Monitoring & Evaluation:    - Liquid meal replacement   - Weight/weight change    Previous Nutrition Goals Met:  N/A  Previous Recommendations:      N/A    Education & Discharge Needs:   [x] None Identified   [] Identified and addressed    [x] Participated in care plan, discharge planning, and/or interdisciplinary rounds        Cultural, Zoroastrianism and ethnic food preferences identified:   None    Skin Integrity: [x]Intact  []Other  Edema: [x]None []Other  Food Allergies: [x]None []Other    Anthropometrics:    Weight Loss Metrics 7/17/2017 7/11/2017 6/25/2017 5/7/2017 5/6/2017   Today's Wt 123 lb 12.8 oz 121 lb 123 lb 7.3 oz 122 lb 2.2 oz 120 lb 2.4 oz   BMI 24.18 kg/m2 23.63 kg/m2 24.11 kg/m2 23.85 kg/m2 23.47 kg/m2      Last 3 Recorded Weights in this Encounter    07/14/17 1249 07/17/17 0640   Weight: 54.4 kg (120 lb) 56.2 kg (123 lb 12.8 oz)      Weight Source: Standing scale (comment)  Height: 5' (152.4 cm),    Body mass index is 24.18 kg/(m^2). IBW : 45.4 kg (100 lb), % IBW (Calculated): 123.8 %   ,      Labs:    Lab Results   Component Value Date/Time    Sodium 139 07/17/2017 04:18 AM    Potassium 3.5 07/17/2017 04:18 AM    Chloride 108 07/17/2017 04:18 AM    CO2 23 07/17/2017 04:18 AM    Glucose 92 07/17/2017 04:18 AM    BUN 2 07/17/2017 04:18 AM    Creatinine 0.46 07/17/2017 04:18 AM    Calcium 7.6 07/17/2017 04:18 AM    Magnesium 1.7 05/10/2017 12:50 AM    Albumin 1.9 07/15/2017 06:14 AM     No results found for: HBA1C, HGBE8, TDN2UKQX, YVO3NHBO  Lab Results   Component Value Date/Time    Glucose 92 07/17/2017 04:18 AM      Lab Results   Component Value Date/Time    ALT (SGPT) 10 07/15/2017 06:14 AM    AST (SGOT) 4 07/15/2017 06:14 AM    Alk.  phosphatase 98 07/15/2017 06:14 AM    Bilirubin, total 0.2 07/15/2017 06:14 AM        Liya Reddish, RD

## 2017-07-17 NOTE — PROGRESS NOTES
2350: Bedside and Verbal shift change report given to DILEEP Klein RN (oncoming nurse) by LUISANA Whyte RN (offgoing nurse). Report included the following information SBAR, Procedure Summary, Intake/Output, MAR, Accordion and Recent Results.

## 2017-07-17 NOTE — PROGRESS NOTES
[facility].  217 [facility] 140 [facility], [address]  [phone]                GI PROGRESS NOTE      NAME:   Leonor Alves   :    1995   MRN:    [id number]     Assessment/Plan   Patient with Ulcerative pancolitis who is 17 wks pregnant with abdominal pain, bloody diarrhea and n/v. C diff and stool cultures are negative. Symptoms most likely 2/2 hyperemesis 2/2 pregnancy and ulcerative colitis flare. After a long discussion, patient is willing to start treatment for Ulcerative colitis. Symptoms have persisted on IV antibiotics. So initiation of steroids was readdressed with the patient. She is now agreeable  - F/u stool cultures  - Continue PPI and Zofran  - Continue IV antibitoics   - IV steroids  - Hep B serologies are negative. TB testing is pending  - Since her n/v has improved, she is willing to consider oral therapies - 5ASA and steroids for symptom control during pregnancy    Anemia - H&H improved after trasnfusion yesterday  - Monitor H&H     Patient Active Problem List   Diagnosis Code    [facility] K50.90    Hyperemesis arising during pregnancy O21.0    Previous  section Z98.891       Subjective:     She is feeling a lot better after a unit of blood. Abd pain and n/v has improved.  She is still having 12-15 blood BMs daily    Review of Systems    Constitutional: negative fever, negative chills, negative weight loss  Eyes:   negative visual changes  ENT:   negative sore throat, tongue or lip swelling  Respiratory:  negative cough, negative dyspnea  Cards:  negative for chest pain, palpitations, lower extremity edema  GI:   See HPI  :  negative for frequency, dysuria  Integument:  negative for rash and pruritus  Heme:  negative for easy bruising and gum/nose bleeding  Musculoskel: negative for myalgias,  back pain and muscle weakness  Neuro:   negative for headaches, dizziness, vertigo  Psych: negative for feelings of anxiety, depression Objective:     VITALS:   Last 24hrs VS reviewed since prior hospitalist progress note. Most recent are:  Visit Vitals    /57 (BP 1 Location: Right arm, BP Patient Position: Post activity)    Pulse 92    Temp 97.7 °F (36.5 °C)    Resp 16    Ht 5' (1.524 m)    Wt 56.2 kg (123 lb 12.8 oz)    SpO2 98%    Breastfeeding No    BMI 24.18 kg/m2       Intake/Output Summary (Last 24 hours) at 07/17/17 1640  Last data filed at 07/17/17 1400   Gross per 24 hour   Intake          3909.17 ml   Output             1275 ml   Net          2634.17 ml        PHYSICAL EXAM:  General   well developed, well nourished,in no acute distress  EENT  Normocephalic, Atraumatic, PERRLA, EOMI, sclera clear, pharynx normal   Respiratory   Clear To Auscultation bilaterally - no wheezes, rales, rhonchi, or crackles  Cardiology  Regular Rate and Rythmn  - no murmurs, rubs or gallops  Abdominal  Soft, non-tender, non-distended, positive bowel sounds, no palpable mass  Extremities  No clubbing, cyanosis, or edema. Skin  Normal skin turgor. No rashes or skin ulcers noted  Neurological  No focal neurological deficits noted  Psychological  Oriented x 3. Normal affect. Lab Data No results found for this or any previous visit (from the past 12 hour(s)).       Medications: Reviewed    PMH/SH reviewed - no change compared to H&P  Attending Physician: Stephanie Esqueda MD   Date/Time:  7/17/2017

## 2017-07-17 NOTE — PROGRESS NOTES
High Risk Obstetrics Progress Note    Name: Jeramie Lange MRN: [id number]  SSN: [id number]    YOB: 1995  Age: 24 y.o. Sex: female      Subjective:      LOS: 3 days    Estimated Date of Delivery: 17   Gestational Age Today: 12w11d     Patient admitted for nausea/vomiting/bloody diarrhea/abdominal pain related to both hyperemesis gravidarum and Crohn's flare. States she does have moderate nausea/vomiting with diarrhea but the volume and frequency is less. The abdominal pain is improved. She reports that the mechanical soft diet is incorrect and she is eating food from home and ordering take out. She does not have chest pain, contractions, fever, headache , pelvic pressure, right upper quadrant pain  , shortness of breath, swelling, vaginal bleeding , vaginal leaking of fluid  and visual disturbances. Objective:     Vitals:  Blood pressure 112/57, pulse 92, temperature 97.7 °F (36.5 °C), resp. rate 16, height 5' (1.524 m), weight 56.2 kg (123 lb 12.8 oz), SpO2 98 %, not currently breastfeeding. Temp (24hrs), Av.2 °F (36.8 °C), Min:97.6 °F (36.4 °C), Max:99.2 °F (01.4 °C)    Systolic (31JOD), CHERYLE:846 , Min:97 , DN      Diastolic (31MDF), DXS:95, Min:44, Max:63       Intake and Output:          Physical Exam:  Pt did have episode of nausea and gagged while I was in the room but no vomiting. Abdomen: soft, nontender at this time      Membranes:  Intact    Uterine Activity:  None    Fetal Heart Rate:  +doppler        Labs:   Recent Results (from the past 36 hour(s))   HEP B SURFACE AB    Collection Time: 17  5:14 AM   Result Value Ref Range    Hepatitis B surface Ab <3.10 mIU/mL    Hep B surface Ab Interp.  NONREACTIVE     CBC WITH AUTOMATED DIFF    Collection Time: 17  5:14 AM   Result Value Ref Range    WBC 12.4 (H) 3.6 - 11.0 K/uL    RBC 2.74 (L) 3.80 - 5.20 M/uL    HGB 6.9 (L) 11.5 - 16.0 g/dL    HCT 21.7 (L) 35.0 - 47.0 %    MCV 79.2 (L) 80.0 - 99.0 FL    MCH 25.2 (L) 26.0 - 34.0 PG    MCHC 31.8 30.0 - 36.5 g/dL    RDW 15.5 (H) 11.5 - 14.5 %    PLATELET 974 052 - 756 K/uL    NEUTROPHILS 63 32 - 75 %    BAND NEUTROPHILS 3 0 - 6 %    LYMPHOCYTES 15 12 - 49 %    MONOCYTES 10 5 - 13 %    EOSINOPHILS 9 (H) 0 - 7 %    BASOPHILS 0 0 - 1 %    ABS. NEUTROPHILS 8.2 (H) 1.8 - 8.0 K/UL    ABS. LYMPHOCYTES 1.9 0.8 - 3.5 K/UL    ABS. MONOCYTES 1.2 (H) 0.0 - 1.0 K/UL    ABS. EOSINOPHILS 1.1 (H) 0.0 - 0.4 K/UL    ABS. BASOPHILS 0.0 0.0 - 0.1 K/UL    DF MANUAL      RBC COMMENTS ANISOCYTOSIS  1+        RBC COMMENTS MICROCYTOSIS  1+        RBC COMMENTS HYPOCHROMIA  1+        RBC COMMENTS POLYCHROMASIA  1+        WBC COMMENTS TOXIC GRANULATION     HEPATITIS B CORE AB, IGM    Collection Time: 07/16/17  5:14 AM   Result Value Ref Range    Hepatitis B core, IgM NONREACTIVE NR     HEP B SURFACE AG    Collection Time: 07/16/17  5:14 AM   Result Value Ref Range    Hepatitis B surface Ag <0.10 Index    Hep B surface Ag Interp.  NEGATIVE  NEG     TYPE & CROSSMATCH    Collection Time: 07/16/17 12:31 PM   Result Value Ref Range    Crossmatch Expiration 07/19/2017     ABO/Rh(D) A NEGATIVE     Antibody screen POS     Physician instructions CMV NEGATIVE     Antibody ID Anti-D passively acquired     Unit number [id number]     Blood component type RC LR AS1     Unit division 00     Status of unit TRANSFUSED     Crossmatch result Compatible     Unit number [id number]     Blood component type RC LR AS1,2     Unit division 00     Status of unit ALLOCATED     Crossmatch result Compatible    METABOLIC PANEL, BASIC    Collection Time: 07/17/17  4:18 AM   Result Value Ref Range    Sodium 139 136 - 145 mmol/L    Potassium 3.5 3.5 - 5.1 mmol/L    Chloride 108 97 - 108 mmol/L    CO2 23 21 - 32 mmol/L    Anion gap 8 5 - 15 mmol/L    Glucose 92 65 - 100 mg/dL    BUN 2 (L) 6 - 20 MG/DL    Creatinine 0.46 (L) 0.55 - 1.02 MG/DL    BUN/Creatinine ratio 4 (L) 12 - 20      GFR est AA >60 >60 ml/min/1.73m2    GFR est non-AA >60 >60 ml/min/1.73m2    Calcium 7.6 (L) 8.5 - 10.1 MG/DL   CBC WITH AUTOMATED DIFF    Collection Time: 17  4:18 AM   Result Value Ref Range    WBC 11.4 (H) 3.6 - 11.0 K/uL    RBC 3.59 (L) 3.80 - 5.20 M/uL    HGB 9.2 (L) 11.5 - 16.0 g/dL    HCT 29.2 (L) 35.0 - 47.0 %    MCV 81.3 80.0 - 99.0 FL    MCH 25.6 (L) 26.0 - 34.0 PG    MCHC 31.5 30.0 - 36.5 g/dL    RDW 16.0 (H) 11.5 - 14.5 %    PLATELET 128 501 - 682 K/uL    NEUTROPHILS 45 32 - 75 %    BAND NEUTROPHILS 13 (H) 0 - 6 %    LYMPHOCYTES 29 12 - 49 %    MONOCYTES 9 5 - 13 %    EOSINOPHILS 4 0 - 7 %    BASOPHILS 0 0 - 1 %    ABS. NEUTROPHILS 6.6 1.8 - 8.0 K/UL    ABS. LYMPHOCYTES 3.3 0.8 - 3.5 K/UL    ABS. MONOCYTES 1.0 0.0 - 1.0 K/UL    ABS. EOSINOPHILS 0.5 (H) 0.0 - 0.4 K/UL    ABS. BASOPHILS 0.0 0.0 - 0.1 K/UL    DF MANUAL      PLATELET COMMENTS LARGE PLATELETS      RBC COMMENTS ANISOCYTOSIS  1+        RBC COMMENTS OVALOCYTES  PRESENT        RBC COMMENTS POLYCHROMASIA  1+           Assessment and Plan: Active Problems:    Crohn's disease ([facility] (2017)      Hyperemesis arising during pregnancy (2017)      Previous  section (2017)     21 WF  EGA 17 wks 6 days with hyperemesis and Crohn's flare from suboptimal management. 1. Following with GI  2. Nutrition consult  3. MFM consult and US tomorrow  4. Potassium improved with replacement now 3.5  5. Continue Zosyn  6. Stool culture returned no routine enteric pathogens and negative campylobacter antigen and shiga toxin producting E. Coli antigen  7. WBC today 11.4  8. Hbg/Hct 9.2/29.2 after 1 unit PRBCs  9. Plan reviewed  10. Questions answered  .   Signed By: Luis Temple MD     2017

## 2017-07-17 NOTE — ROUTINE PROCESS
0000:  Bedside and Verbal shift change report given to Park Nicholas RN  (oncoming nurse) by Fauzia Poon RN  (offgoing nurse). Report included the following information SBAR.

## 2017-07-18 PROCEDURE — 74011250637 HC RX REV CODE- 250/637: Performed by: INTERNAL MEDICINE

## 2017-07-18 PROCEDURE — C9113 INJ PANTOPRAZOLE SODIUM, VIA: HCPCS | Performed by: OBSTETRICS & GYNECOLOGY

## 2017-07-18 PROCEDURE — 74011000250 HC RX REV CODE- 250: Performed by: OBSTETRICS & GYNECOLOGY

## 2017-07-18 PROCEDURE — 74011250636 HC RX REV CODE- 250/636: Performed by: OBSTETRICS & GYNECOLOGY

## 2017-07-18 PROCEDURE — 74011000258 HC RX REV CODE- 258: Performed by: INTERNAL MEDICINE

## 2017-07-18 PROCEDURE — 76811 OB US DETAILED SNGL FETUS: CPT | Performed by: OBSTETRICS & GYNECOLOGY

## 2017-07-18 PROCEDURE — 74011250636 HC RX REV CODE- 250/636: Performed by: INTERNAL MEDICINE

## 2017-07-18 PROCEDURE — 65410000002 HC RM PRIVATE OB

## 2017-07-18 PROCEDURE — 74011250637 HC RX REV CODE- 250/637: Performed by: OBSTETRICS & GYNECOLOGY

## 2017-07-18 RX ADMIN — SODIUM CHLORIDE, SODIUM LACTATE, POTASSIUM CHLORIDE, AND CALCIUM CHLORIDE 125 ML/HR: 600; 310; 30; 20 INJECTION, SOLUTION INTRAVENOUS at 17:45

## 2017-07-18 RX ADMIN — METHYLPREDNISOLONE SODIUM SUCCINATE 40 MG: 40 INJECTION, POWDER, FOR SOLUTION INTRAMUSCULAR; INTRAVENOUS at 14:38

## 2017-07-18 RX ADMIN — Medication 10 ML: at 22:26

## 2017-07-18 RX ADMIN — OXYCODONE HYDROCHLORIDE AND ACETAMINOPHEN 2 TABLET: 5; 325 TABLET ORAL at 19:09

## 2017-07-18 RX ADMIN — PIPERACILLIN AND TAZOBACTAM 3.38 G: 3; .375 INJECTION, POWDER, FOR SOLUTION INTRAVENOUS at 22:25

## 2017-07-18 RX ADMIN — SODIUM CHLORIDE, SODIUM LACTATE, POTASSIUM CHLORIDE, AND CALCIUM CHLORIDE 125 ML/HR: 600; 310; 30; 20 INJECTION, SOLUTION INTRAVENOUS at 05:53

## 2017-07-18 RX ADMIN — PIPERACILLIN AND TAZOBACTAM 3.38 G: 3; .375 INJECTION, POWDER, FOR SOLUTION INTRAVENOUS at 05:54

## 2017-07-18 RX ADMIN — OXYCODONE HYDROCHLORIDE AND ACETAMINOPHEN 2 TABLET: 5; 325 TABLET ORAL at 11:55

## 2017-07-18 RX ADMIN — METHYLPREDNISOLONE SODIUM SUCCINATE 40 MG: 40 INJECTION, POWDER, FOR SOLUTION INTRAMUSCULAR; INTRAVENOUS at 05:54

## 2017-07-18 RX ADMIN — SODIUM CHLORIDE 40 MG: 9 INJECTION INTRAMUSCULAR; INTRAVENOUS; SUBCUTANEOUS at 22:25

## 2017-07-18 RX ADMIN — METHYLPREDNISOLONE SODIUM SUCCINATE 40 MG: 40 INJECTION, POWDER, FOR SOLUTION INTRAMUSCULAR; INTRAVENOUS at 22:25

## 2017-07-18 RX ADMIN — MESALAMINE 500 MG: 250 CAPSULE ORAL at 20:38

## 2017-07-18 RX ADMIN — PIPERACILLIN AND TAZOBACTAM 3.38 G: 3; .375 INJECTION, POWDER, FOR SOLUTION INTRAVENOUS at 14:43

## 2017-07-18 NOTE — PROGRESS NOTES
[facility]  217 [facility] [address]  [place], [address]  [phone]                GI PROGRESS NOTE  Reyes Redman, [facility]  Work Cell: [phone]      NAME:   Ashlie Felix   :    1995   MRN:    [id number]     Assessment/Plan   Patient with Ulcerative pancolitis who is 17 wks pregnant with abdominal pain, bloody diarrhea and n/v. C diff and stool cultures are negative. Symptoms most likely 2/2 hyperemesis 2/2 pregnancy and ulcerative colitis flare. After a long discussion, patient is willing to start treatment for Ulcerative colitis. Symptoms persistent on IV antibiotics, but improved after initiation of IV steroids.  - Continue PPI and Zofran  - Continue IV antibitoics and steroids  - Hep B serologies are negative. TB testing is pending.  - Starting 5ASA in conjunction with steroids for symptom control during pregnancy     Anemia - likely related to the above, H&H improved after transfusion   - Monitor H&H     Patient Active Problem List   Diagnosis Code    Cro[facility]'s disease ([facility] K50.90    Hyperemesis arising during pregnancy O21.0    Previous  section O58.563       Subjective:     Feeling better. Reports improvement in nausea, vomiting, abdominal pain and bloody diarrhea. Keeping more down. Reports having two episodes of emesis today.        Review of Systems    Constitutional: negative fever, negative chills, negative weight loss  Eyes:   negative visual changes  ENT:   negative sore throat, tongue or lip swelling  Respiratory:  negative cough, negative dyspnea  Cards:  negative for chest pain, palpitations, lower extremity edema  GI:   See HPI  :  negative for frequency, dysuria  Integument:  negative for rash and pruritus  Heme:  negative for easy bruising and gum/nose bleeding  Musculoskel: negative for myalgias, back pain and muscle weakness  Neuro: negative for headaches, dizziness, vertigo  Psych:  negative for feelings of anxiety, depression      Objective:     VITALS:   Last 24hrs VS reviewed since prior hospitalist progress note. Most recent are:  Visit Vitals    /42    Pulse 70    Temp 97.9 °F (36.6 °C)    Resp 16    Ht 5' (1.524 m)    Wt 56.2 kg (123 lb 12.8 oz)    SpO2 98%    Breastfeeding No    BMI 24.18 kg/m2     No intake or output data in the 24 hours ending 07/18/17 1455     PHYSICAL EXAM:  General   well developed, alert, pleasant, in no acute distress  EENT  Normocephalic, Atraumatic, PERRLA, EOMI, sclera clear  Respiratory   Clear To Auscultation bilaterally - no wheezes, rales, rhonchi, or crackles  Cardiology  Regular Rate and Rythmn  - no murmurs, rubs or gallops  Abdominal  Soft, non-tender, non-distended, positive bowel sounds, no hepatosplenomegaly, no palpable mass  Extremities  No clubbing, cyanosis, or edema. Pulses intact. Neurological  No focal neurological deficits noted  Psychological  Oriented x 3. Normal affect. Lab Data No results found for this or any previous visit (from the past 12 hour(s)). Medications: Reviewed    PMH/SH reviewed - no change compared to H&P  Mid-Level Provider: Shekhar Marsh NP   Date/Time:  7/18/2017        I have personally reviewed the history and independently examined the patient. I have reviewed the chart and agree with the documentation recorded by the Mid Level Provider, including the assessment, treatment plan, and disposition. ASSESSMENT AND PLAN:  Abd pain and n/v have resolved. Diarrhea improved significantly to 4-5 Bms a day with minimal blood. Abd soft, BS+ nt/nd.  Responding well to IV steroids  - another 24hrs of IV steroids then switch to oral  - Pentasa 4gm started today - she would like Lialda because of once a day dosing but not available in the hospital. Will switch as Jl Garduno MD

## 2017-07-18 NOTE — PROGRESS NOTES
High Risk Obstetrics Progress Note    Name: Chuck Mackenzie MRN: [id number]  SSN: [id number]    YOB: 1995  Age: 24 y.o. Sex: female      Subjective:      LOS: 4 days    Estimated Date of Delivery: 17   Gestational Age Today: 19w0d     Patient admitted for nausea/vomiting/bloody diarrhea/abdominal pain related to hyperemesis gravidarum and Crohn's Flare. States she does not have abdominal pain  , fever, headache , pelvic pressure, shortness of breath, vaginal bleeding  and vaginal leaking of fluid . States has been able to keep her breakfast down. No vomiting yet. Has had 4 episodes of bloody diarrhea. Feels better than yesterday. Had her Ultrasound with M. Objective:     Vitals:  Blood pressure 110/42, pulse 70, temperature 97.9 °F (36.6 °C), resp. rate 16, height 5' (1.524 m), weight 56.2 kg (123 lb 12.8 oz), SpO2 98 %, not currently breastfeeding. Temp (24hrs), Av.3 °F (36.8 °C), Min:97.9 °F (36.6 °C), Max:98.6 °F (37 °C)    Systolic (98HQO), EYP:925 , Min:107 , RFY:566      Diastolic (85TSC), ALEJANDRA:93, Min:42, Max:53       Intake and Output:          Physical Exam:  Patient without distress.   Heart: Regular rate and rhythm  Lung: normal respiratory effort  Abdomen: soft, nontender  Lower Extremities:  - Edema No       Membranes:  Intact    Uterine Activity:  None    Fetal Heart Rate:  Positive by Doppler        Labs:   Recent Results (from the past 36 hour(s))   METABOLIC PANEL, BASIC    Collection Time: 17  4:18 AM   Result Value Ref Range    Sodium 139 136 - 145 mmol/L    Potassium 3.5 3.5 - 5.1 mmol/L    Chloride 108 97 - 108 mmol/L    CO2 23 21 - 32 mmol/L    Anion gap 8 5 - 15 mmol/L    Glucose 92 65 - 100 mg/dL    BUN 2 (L) 6 - 20 MG/DL    Creatinine 0.46 (L) 0.55 - 1.02 MG/DL    BUN/Creatinine ratio 4 (L) 12 - 20      GFR est AA >60 >60 ml/min/1.73m2    GFR est non-AA >60 >60 ml/min/1.73m2    Calcium 7.6 (L) 8.5 - 10.1 MG/DL   CBC WITH AUTOMATED DIFF    Collection Time: 17  4:18 AM   Result Value Ref Range    WBC 11.4 (H) 3.6 - 11.0 K/uL    RBC 3.59 (L) 3.80 - 5.20 M/uL    HGB 9.2 (L) 11.5 - 16.0 g/dL    HCT 29.2 (L) 35.0 - 47.0 %    MCV 81.3 80.0 - 99.0 FL    MCH 25.6 (L) 26.0 - 34.0 PG    MCHC 31.5 30.0 - 36.5 g/dL    RDW 16.0 (H) 11.5 - 14.5 %    PLATELET 146 071 - 148 K/uL    NEUTROPHILS 45 32 - 75 %    BAND NEUTROPHILS 13 (H) 0 - 6 %    LYMPHOCYTES 29 12 - 49 %    MONOCYTES 9 5 - 13 %    EOSINOPHILS 4 0 - 7 %    BASOPHILS 0 0 - 1 %    ABS. NEUTROPHILS 6.6 1.8 - 8.0 K/UL    ABS. LYMPHOCYTES 3.3 0.8 - 3.5 K/UL    ABS. MONOCYTES 1.0 0.0 - 1.0 K/UL    ABS. EOSINOPHILS 0.5 (H) 0.0 - 0.4 K/UL    ABS. BASOPHILS 0.0 0.0 - 0.1 K/UL    DF MANUAL      PLATELET COMMENTS LARGE PLATELETS      RBC COMMENTS ANISOCYTOSIS  1+        RBC COMMENTS OVALOCYTES  PRESENT        RBC COMMENTS POLYCHROMASIA  1+           Assessment and Plan: Active Problems:    Crohn's disease ([address] (2017)      Hyperemesis arising during pregnancy (2017)      Previous  section (2017)       21 WF  EGA 18 wks 0 days with hyperemesis and Crohn's flare from suboptimal management. 1. Following with GI  2. Nutrition consult done and tolerating well  3. MFM consult and US done today with Dr. Jason Coleman  4. Potassium improved with replacement now 3.5  5. Continue Zosyn  6. Stool culture returned no routine enteric pathogens and negative campylobacter antigen and shiga toxin producting E. Coli antigen  7. Hbg/Hct 9.2/29.2 after 1 unit PRBCs  8. Plan reviewed  9.  Questions answered    Signed By: Grecia Woodson MD     2017

## 2017-07-18 NOTE — PROGRESS NOTES
Bedside shift change report given to King Araujo RN (oncoming nurse) by Stephanie Ulrich RN (offgoing nurse). Report included the following information SBAR, Kardex and MAR.      Hourly rounds completed from [id number] and [id number]

## 2017-07-18 NOTE — PROGRESS NOTES
Bedside shift change report given to CECIL Jones RN (oncoming nurse) by Mesfin Obrien RN (offgoing nurse). Report included the following information SBAR, Kardex, MAR and Recent Results. 5: Spoke with Dr. Kurtis Escobar and updated her regarding patient and GI consult. [id number]: Hourly rounds completed. [id number]:  Hourly rounds completed.

## 2017-07-18 NOTE — CONSULTS
Maternal-Fetal Medicine    Indication: Inflammatory bowel disease, Suspected damage to fetus from drugs O35. 5XX0, Poor Ob Hx 2nd Tri O09.292, Maternal care for low transverse scar O34.211.   ____________________________________________________________________________  History: Age: 21 years. : 2 Para: 1. Previous pregnancies: Children born : 1.  ____________________________________________________________________________  Dating:  Stated EDC:  EDC: 17 GA by stated EDC: 18w0d  Current Scan on: 17 EDC: 17 GA by current scan: 17w6d  Best Overall Assessment: 17 EDC: 17 Assessed GA: 18w0d  The calculation of the gestational age by current scan was based on BPD, HC, AC and FL. The Best Overall Assessment is based on the stated EDC.  ____________________________________________________________________________  General Evaluation:  Fetal heart activity: present. Fetal heart rate: 163 bpm.   Presentation: cephalic. Fetal movement: visible. Amniotic Fluid: normal. Maximal vertical pocket 5.1 cm. Cord: 3 vessels. Placental cord insertion site: Normal. Fetal cord insertion site: Normal.   Placenta: posterior. ____________________________________________________________________________  Anatomy Scan:  Knapp gestation. Biometry:  BPD 39.8 mm  - 18w1d (17w4d to 18w4d)  .5 mm  - 17w6d (16w5d to 19w1d)  .7 mm  - 17w4d (17w0d to 18w1d)  FL 26.1 mm  - 18w0d (16w4d to 19w2d)  OFD 52.4 mm  - 18w1d  TCD 17.5 mm  - 17w5d  HUM 24.9 mm  - 18w0d  VENTRp 7.0 mm  -  CM 3.3 mm  -  NUCHAL FOLD 4.08 mm  NASAL BONE 4.4 mm  -  EFW (lbs/oz) 0 lbs 7 ozs  EFW (g) 209 g  n/a       Fetal Anatomy:  Visualized with normal appearance: head, neck, skin, chest, abdominal wall, gastrointestinal tract, kidneys, bladder, extremities, skeleton.     Brain: Visualized and normal appearance: brain parenchyma, cerebral ventricles, choroid plexus, Cisterna Magna, midline falx, cerebellum, cerebellar lobes, posterior fossa, vermis, cavum septi pellucidi. Face: Normal face (profile, nasal bone, eyes, nose, lip, and palate). Spine: Normal Spine (Cervical, Thoracic, Lumbar and Sacral)  Heart: Visualized and normal appearance: four-chamber view, left outflow tract, right outflow tract, ventricles, great vessels, Ductal arch, inferior vena cava, superior vena cava. Aortic arch: Normal.  Genitalia: normal.    Summary of Ultrasound Findings:  Transabdominal US. U/S machine: [employer] E8 Expert. U/S view: good. ____________________________________________________________________________  Genetic Sonogram:  measured FL: 26.1 mm exp. FL: 26.6 mm  measured HUM: 24.9 mm exp. HUM: 25.9 mm  ratio FL/exp. FL: 0.98  ratio HUM/exp. HUM: 0.96  Nuchal fold normal. Pyelectasis absent. No hyperechogenic bowel. Echogenic intracardiac focus absent. Additional Marker: Nasal Bone: present.     ____________________________________________________________________________  Consultation:  Type of Consultation: Inpatient Consultation (45 min)  Consultant: Dr. Gregorio Ramirez had the pleasure of seeing Ms. Gonzalez at the [facility].. She is a  at 25 -weeks gestation and has ulcerative colitis with flare. She was admitted with nausea, vomiting and bloody diarrhea. She also had abdominal pain that improved after hospitalization. Patient reports she has Crohns disease, but is willing to accept the diagnosis of ulcerative colitis that was based on biopsy and clinical features (Dr. Devon Cheek, Gastroenterologist). Since admission, the patient is receiving IV methylprednisolone 40 mg q 8hourly. She was counseled on biologics and the patient is considering that option. She reports no other chronic medical conditions including diabetes or hypertension. She does not have gastroenterologist now. Allergies: Codeine (anaphylaxis), but can take oxycodone.   Medications: She cannot tolerate prenatal vitamins containing iron. Now on IV methylprednisolone  Social: Denies tobacco or drug or alcohol use. She owns a restaurant. Her partner (father of her previous child) is in good health. He is a . Family: No history of venous thromboembolism. Obstetric history:   delivery at 32 weeks gestation at [facility].. Patient had sharp abdominal pain and decreased fetal movements. On ultrasound, placental abruption was diagnosed. Patient had, apparently, nonreassuring fetal heart trace and had an emergency  delivery. Baby  in the immediate  period and she reports it was because of anemia (Baby bled into me). Prenatal care: She is being followed by her obstetrician, Dr. Ross Webber. She had noninvasive prenatal screening that showed low risk for fetal aneuploidies. Patient also had consultation with BRAYAN Nelson at [facility]. P/E: Patient is comfortable; not in pain. Vitals stable. /42 mm Hg; afebrile. Abd: Soft gravid uterus; no tenderness. Labs: Hb 9.2, Bct 29.2, , WBC 11.4, electrolytes normal, LAT 10, AST 14, A negative. Stool culture negative. Ultrasound abdomen: Normal kidneys. I counseled the patient on the following:  Ulcerative colitis:  It is possible that about one-third of the cases of ulcerative colitis recur in pregnancy. In the absence of active disease, it is unlikely to influence the pregnancy outcome. However, if she were to have active inflammation during pregnancy, the chances of  delivery, IUGR, are increased. Some of the drugs commonly given for ulcerative colitis like sulfasalazine and corticosteroids are safe. Overall, in the absence of active disease she is likely to have a good pregnancy outcome. Patient seems to have active disease now and had a few flares early in pregnancy. She understands that she needs active treatment to control flares.  She was reluctant because of her previous adverse obstetric outcome when she was taking asacol (?). I reassured her that treatment with asacol, azathioprine, remicaide or humira are safe in pregnancy, and they have not been associated with adverse outcomes. On the contrary, control of active disease is associated with good outcomes. Given that benefits outweigh risks, I strongly recommended that she consider treatment with biologics (after GI consultation). Mesalamine in Asacol HD (Actavis) contains debutyl phthalate (DHP) that was associated with increased fetal anomalies in animal studies. However, they were exposed at supratherapeutic levels. Human exposures have not shown increased anomalies. Since she is able to tolerate some oral intake, we will defer parenteral nutrition. History of placental abruption: Patient was smoking cigarettes before this pregnancy and I am unable to determine how much smoked during her last pregnancy. Smoking has been associated with placental abruption. She did not have hypertension in her previous pregnancy or used any drugs including cocaine. We also discussed the possibility of fetomaternal hemorrhage leading to  death. Anemia: I briefly counseled on intravenous iron transfusion since she is unable to tolerate oral iron. Previous  section: I reassured the patient that there is no evidence of placenta previa or accreta. I informed her that vaginal birth is not contraindicated and she is encouraged to consider  that carries few complications. Patient expressed her desire to deliver at [address] [place] [address] and is in the process of finding an Ob-Gyn who has privileges at [address] [place] [address]. I will be discussing with Dr. Ciaran Mckeon.  ____________________________________________________________________________  Maternal Structures:  Cervix: Cervical length: 36 mm. Right Ovary: not visible. Left Ovary: not visible.  ____________________________________________________________________________  Report Summary:  Impression:    We performed a fetal anatomy scan. No markers of aneuploidies or fetal structural defects are seen. The fetal biometry is consistent with her previously-established dates. Amniotic fluid is normal and good fetal activity is seen. Patient understands the limitations of ultrasound in detecting fetal anomalies. Recommendations:   -Folic acid 2 mg daily.  -Biologics after consultation with Dr. Brennan Montilla. -IV fluids to continue and encourage oral intake.  -Serial fetal growth assessments every 4 weeks. -If flares recur, we recommend weekly  testing from 34 weeks until delivery.

## 2017-07-18 NOTE — PROGRESS NOTES
OB Hospitalist    Went in to see patient. Patient at the [facility] for Ultrasound.       Dr. Yanet Tong

## 2017-07-18 NOTE — PROGRESS NOTES
Bedside, Verbal and Written shift change report given to FREDERICK Burgess (oncoming nurse) by Wilfredo Lamb RN (offgoing nurse). Report included the following information SBAR, Kardex, Intake/Output, MAR and Recent Results. @2100 Dr. Joby Mix in to start IV after several attempts by RN, MD successful;  @7681 2nd tablet of Percocet given for no change in pain after 1st given at 1900 hour;  @2120 Medications that were waiting for IV started; pt reports some nausea, requests medication;  @2130 Pt refuses PNV, reports major side effects with iron in vitamin, OB aware;  @2200 Pt continues to report loose, bloody stools;  @2212 Pt reports pain much better after medication, pt reports sleepy;  @2215 Epi hose placed;  @2345 Bedside, Verbal and Written shift change report given to CARMELA Matute (oncoming nurse) by Lux Morales. Bethany Wallace RN (offgoing nurse). Report included the following information SBAR, Kardex, Intake/Output, MAR and Recent Results.

## 2017-07-19 PROCEDURE — 74011250636 HC RX REV CODE- 250/636: Performed by: OBSTETRICS & GYNECOLOGY

## 2017-07-19 PROCEDURE — 74011000258 HC RX REV CODE- 258: Performed by: INTERNAL MEDICINE

## 2017-07-19 PROCEDURE — 65410000002 HC RM PRIVATE OB

## 2017-07-19 PROCEDURE — 74011250637 HC RX REV CODE- 250/637: Performed by: INTERNAL MEDICINE

## 2017-07-19 PROCEDURE — 74011250636 HC RX REV CODE- 250/636: Performed by: INTERNAL MEDICINE

## 2017-07-19 PROCEDURE — 74011000250 HC RX REV CODE- 250: Performed by: OBSTETRICS & GYNECOLOGY

## 2017-07-19 PROCEDURE — C9113 INJ PANTOPRAZOLE SODIUM, VIA: HCPCS | Performed by: OBSTETRICS & GYNECOLOGY

## 2017-07-19 PROCEDURE — 74011250637 HC RX REV CODE- 250/637: Performed by: OBSTETRICS & GYNECOLOGY

## 2017-07-19 RX ADMIN — PIPERACILLIN AND TAZOBACTAM 3.38 G: 3; .375 INJECTION, POWDER, FOR SOLUTION INTRAVENOUS at 06:12

## 2017-07-19 RX ADMIN — PIPERACILLIN AND TAZOBACTAM 3.38 G: 3; .375 INJECTION, POWDER, FOR SOLUTION INTRAVENOUS at 22:20

## 2017-07-19 RX ADMIN — MESALAMINE 500 MG: 250 CAPSULE ORAL at 19:54

## 2017-07-19 RX ADMIN — SODIUM CHLORIDE, SODIUM LACTATE, POTASSIUM CHLORIDE, AND CALCIUM CHLORIDE 125 ML/HR: 600; 310; 30; 20 INJECTION, SOLUTION INTRAVENOUS at 11:16

## 2017-07-19 RX ADMIN — MESALAMINE 500 MG: 250 CAPSULE ORAL at 00:44

## 2017-07-19 RX ADMIN — OXYCODONE HYDROCHLORIDE AND ACETAMINOPHEN 2 TABLET: 5; 325 TABLET ORAL at 12:04

## 2017-07-19 RX ADMIN — SODIUM CHLORIDE 40 MG: 9 INJECTION INTRAMUSCULAR; INTRAVENOUS; SUBCUTANEOUS at 22:10

## 2017-07-19 RX ADMIN — Medication 10 ML: at 11:16

## 2017-07-19 RX ADMIN — METHYLPREDNISOLONE SODIUM SUCCINATE 40 MG: 40 INJECTION, POWDER, FOR SOLUTION INTRAMUSCULAR; INTRAVENOUS at 21:58

## 2017-07-19 RX ADMIN — MESALAMINE 500 MG: 250 CAPSULE ORAL at 14:03

## 2017-07-19 RX ADMIN — METHYLPREDNISOLONE SODIUM SUCCINATE 40 MG: 40 INJECTION, POWDER, FOR SOLUTION INTRAMUSCULAR; INTRAVENOUS at 14:05

## 2017-07-19 RX ADMIN — MESALAMINE 500 MG: 250 CAPSULE ORAL at 09:53

## 2017-07-19 RX ADMIN — OXYCODONE HYDROCHLORIDE AND ACETAMINOPHEN 2 TABLET: 5; 325 TABLET ORAL at 00:48

## 2017-07-19 RX ADMIN — METHYLPREDNISOLONE SODIUM SUCCINATE 40 MG: 40 INJECTION, POWDER, FOR SOLUTION INTRAMUSCULAR; INTRAVENOUS at 06:03

## 2017-07-19 RX ADMIN — Medication 10 ML: at 22:14

## 2017-07-19 RX ADMIN — PIPERACILLIN AND TAZOBACTAM 3.38 G: 3; .375 INJECTION, POWDER, FOR SOLUTION INTRAVENOUS at 14:07

## 2017-07-19 RX ADMIN — SODIUM CHLORIDE, SODIUM LACTATE, POTASSIUM CHLORIDE, AND CALCIUM CHLORIDE 125 ML/HR: 600; 310; 30; 20 INJECTION, SOLUTION INTRAVENOUS at 19:53

## 2017-07-19 NOTE — PROGRESS NOTES
Bedside shift change report given to [address] [place] [address] (oncoming nurse) by Dallin CUETO (offgoing nurse). Report included the following information SBAR, Procedure Summary, Intake/Output, MAR and Recent Results.

## 2017-07-19 NOTE — PROGRESS NOTES
NUTRITION COMPLETE ASSESSMENT    RECOMMENDATIONS:   1. Continue to monitor wt for trends: Goal wt gain: 0.4 kg weekly. 2. Continue with current diet. 3. Pt would benefit from additional Vit D with steroids. Interventions/Plan:   Food/Nutrient Delivery:    Commercial supplement          Assessment:   Reason for Assessment:   [] Provider Consult  []BPA/MST Referral   []LOS   [x]Reassessment   []NPO/Clear Liquid   []At Nutrition Risk  []Other    Diet:  Low fiber  Supplements: Ensure Enlive  Nutritionally Significant Medications: [x] Reviewed   Meal Intake: No data found. Subjective:  I am feeling better. Objective:  18w0d, chart reviewed and pt seen for follow up. Pt with UC flare and to start 5ASA in conjunction with steroids. Po intake improving and stool output decreasing. Pt noted to have 1.7 kg wt increase since admission. Michele Aleman does not like Ensure but willing to try plain milkshakes and snacks to supplement nutrition. Estimated Nutrition Needs:   Kcals/day: 2000 Kcals/day (2000- 2260 kcal/day (MSJ + 400 caloires for pregnancy))  Protein: 73 g (73-84 gm/day ( 1.3-1.5 gm/kg/AW) )  Fluid: 1680 ml  Based On: [place] Jeor  Weight Used: Actual wt    Pt expected to meet estimated nutrient needs:  []   Yes     []  No  [x] Unable to predict at this time      Nutrition Diagnosis:   1. Inadequate protein-energy intake related to altered GI function/ nausea/vomiting  as evidenced by poor intake and slow wt gain. Goals:     Consume 50% meals and x 2 supplements daily x 4-5 day.       Monitoring & Evaluation:    - Liquid meal replacement   - Weight/weight change     Previous Nutrition Goals Met:  Progressing  Previous Recommendations:      Progressing    Education & Discharge Needs:   [x] None Identified   [] Identified and addressed    [x] Participated in care plan, discharge planning, and/or interdisciplinary rounds        Cultural, Restoration and ethnic food preferences identified:   None    Skin Integrity: [x]Intact  []Other  Edema: [x]None []Other  Last BM: x5   Food Allergies: [x]None []Other    Anthropometrics:    Weight Loss Metrics 7/17/2017 7/11/2017 6/25/2017 5/7/2017 5/6/2017   Today's Wt 123 lb 12.8 oz 121 lb 123 lb 7.3 oz 122 lb 2.2 oz 120 lb 2.4 oz   BMI 24.18 kg/m2 23.63 kg/m2 24.11 kg/m2 23.85 kg/m2 23.47 kg/m2      Last 3 Recorded Weights in this Encounter    07/14/17 1249 07/17/17 0640   Weight: 54.4 kg (120 lb) 56.2 kg (123 lb 12.8 oz)      Weight Source: Standing scale (comment)  Height: 5' (152.4 cm),    Body mass index is 24.18 kg/(m^2). IBW : 45.4 kg (100 lb), % IBW (Calculated): 123.8 %   ,      Labs:    Lab Results   Component Value Date/Time    Sodium 139 07/17/2017 04:18 AM    Potassium 3.5 07/17/2017 04:18 AM    Chloride 108 07/17/2017 04:18 AM    CO2 23 07/17/2017 04:18 AM    Glucose 92 07/17/2017 04:18 AM    BUN 2 07/17/2017 04:18 AM    Creatinine 0.46 07/17/2017 04:18 AM    Calcium 7.6 07/17/2017 04:18 AM    Magnesium 1.7 05/10/2017 12:50 AM    Albumin 1.9 07/15/2017 06:14 AM     No results found for: HBA1C, HGBE8, UHA5KTPP, RZA0FTZQ  Lab Results   Component Value Date/Time    Glucose 92 07/17/2017 04:18 AM      Lab Results   Component Value Date/Time    ALT (SGPT) 10 07/15/2017 06:14 AM    AST (SGOT) 4 07/15/2017 06:14 AM    Alk.  phosphatase 98 07/15/2017 06:14 AM    Bilirubin, total 0.2 07/15/2017 06:14 AM        Abimael Latham RD

## 2017-07-19 NOTE — PROGRESS NOTES
High Risk Obstetrics Progress Note    Name: Leonor Alves MRN: [id number]  SSN: [id number]    YOB: 1995  Age: 24 y.o. Sex: female      Subjective:      LOS: 5 days    Estimated Date of Delivery: 17   Gestational Age Today: 21w2d     Patient admitted for nausea/vomiting/bloody diarrhea/abdominal pain related to hyperemesis gravidarum and ulcerative colitis flare . States she does have mild abdominal pain and only one episode of diarrhea this am and does not have chest pain, contractions, fever, headache , nausea and vomiting, pelvic pressure, right upper quadrant pain  , shortness of breath, swelling, vaginal bleeding , vaginal leaking of fluid  and visual disturbances. Objective:     Vitals:  Blood pressure 108/49, pulse 96, temperature 98.2 °F (36.8 °C), resp. rate 16, height 5' (1.524 m), weight 56.2 kg (123 lb 12.8 oz), SpO2 98 %, not currently breastfeeding. Temp (24hrs), Av.5 °F (36.9 °C), Min:97.9 °F (36.6 °C), Max:99.6 °F (66.3 °C)    Systolic (95BUH), DEM:390 , Min:108 , RZW:238      Diastolic (44GNM), ZGA:76, Min:49, Max:73       Intake and Output:          Physical Exam:  Deferred       Membranes:  Intact    Uterine Activity:  None    Fetal Heart Rate:  +doppler        Labs: No results found for this or any previous visit (from the past 36 hour(s)). Assessment and Plan: Active Problems:    Crohn's disease ([facility] [address] (2017)      Hyperemesis arising during pregnancy (2017)      Previous  section (2017)       . 21 WF  EGA 18 wks 1 days with hyperemesis and ulcerative colitis flare from suboptimal management. 1. Following along with GI  2. Nutrition consult done and tolerating well  3. MFM consult and US done with Dr. Lynn Thomas  4. Potassium improved with replacement now 3.5  5. Continue Zosyn  6. Stool culture returned no routine enteric pathogens and negative campylobacter antigen and shiga toxin producting E.  Coli antigen  7. Hbg/Hct 9.2/29.2 after 1 unit PRBCs  8. Plan reviewed  9. Questions answered  10. Plans on establishing with [facility] Dr. Esther Elizabeth.     Signed By: Eloisa Beckford MD     July 19, 2017

## 2017-07-19 NOTE — PROGRESS NOTES
Bedside and Verbal shift change report given to Brianne Steward RN (oncoming nurse) by Kamaljit Michele RN (offgoing nurse). Report included the following information SBAR, Kardex and MAR.

## 2017-07-19 NOTE — PROGRESS NOTES
[address]  [address] [place]  [place], [address]  [phone]                GI PROGRESS NOTE  Haley Murillo, [facility]  Work Cell: [phone]      NAME:   Huma Lomas   :    1995   MRN:    [id number]     Assessment/Plan   Patient with Ulcerative pancolitis who is 17 wks pregnant with abdominal pain, bloody diarrhea and n/v. C diff and stool cultures are negative. Symptoms most likely 2/2 hyperemesis 2/2 pregnancy and ulcerative colitis flare. After a long discussion, patient is willing to start treatment for Ulcerative colitis. Symptoms persistent on IV antibiotics, but improved after initiation of IV steroids.  - Continue PPI and Zofran  - Continue IV steroids through today then may switch to PO tomorrow  - Hep B serologies are negative. TB testing is pending.  - Starting 5ASA in conjunction with steroids for symptom control during pregnancy      Anemia - likely related to the above, H&H improved after transfusion   - Monitor H&H     Patient Active Problem List   Diagnosis Code    Crohn's disease ([facility] [address] K50.90    Hyperemesis arising during pregnancy O21.0    Previous  section C07.479       Subjective:     Feeling better. Denies any abdominal pain. Tolerating diet with less nausea and vomiting. Amount blood present with stool and frequency is less. Had 2-3 stools yesterday. No fever or chills.        Review of Systems    Constitutional: negative fever, negative chills, negative weight loss  Eyes:   negative visual changes  ENT:   negative sore throat, tongue or lip swelling  Respiratory:  negative cough, negative dyspnea  Cards:  negative for chest pain, palpitations, lower extremity edema  GI:   See HPI  :  negative for frequency, dysuria  Integument:  negative for rash and pruritus  Heme:  negative for easy bruising and gum/nose bleeding  Musculoskel: negative for myalgias, back pain and muscle weakness  Neuro: negative for headaches, dizziness, vertigo  Psych:  negative for feelings of anxiety, depression     Objective:     VITALS:   Last 24hrs VS reviewed since prior hospitalist progress note. Most recent are:  Visit Vitals    /73 (BP 1 Location: Right arm, BP Patient Position: At rest)    Pulse 100    Temp 98 °F (36.7 °C)    Resp 16    Ht 5' (1.524 m)    Wt 56.2 kg (123 lb 12.8 oz)    SpO2 98%    Breastfeeding No    BMI 24.18 kg/m2       Intake/Output Summary (Last 24 hours) at 07/19/17 8569  Last data filed at 07/18/17 2233   Gross per 24 hour   Intake             2050 ml   Output             1300 ml   Net              750 ml        PHYSICAL EXAM:  General   well developed, alert, pleasant, in no acute distress  EENT  Normocephalic, Atraumatic, PERRLA, EOMI, sclera clear  Respiratory   Clear To Auscultation bilaterally - no wheezes, rales, rhonchi, or crackles  Cardiology  Regular Rate and Rythmn  - no murmurs, rubs or gallops  Abdominal  Soft, non-tender, non-distended, positive bowel sounds, no hepatosplenomegaly, no palpable mass  Extremities  No clubbing, cyanosis, or edema. Pulses intact. Neurological  No focal neurological deficits noted  Psychological  Oriented x 3. Normal affect. Lab Data No results found for this or any previous visit (from the past 12 hour(s)).       Medications: Reviewed    PMH/SH reviewed - no change compared to H&P  Mid-Level Provider: Chato Garrison NP   Date/Time:  7/19/2017

## 2017-07-20 VITALS
WEIGHT: 123.8 LBS | SYSTOLIC BLOOD PRESSURE: 111 MMHG | HEIGHT: 60 IN | OXYGEN SATURATION: 95 % | RESPIRATION RATE: 20 BRPM | HEART RATE: 62 BPM | BODY MASS INDEX: 24.3 KG/M2 | DIASTOLIC BLOOD PRESSURE: 62 MMHG | TEMPERATURE: 99.2 F

## 2017-07-20 PROBLEM — K51.011 ULCERATIVE PANCOLITIS WITH RECTAL BLEEDING (HCC): Status: ACTIVE | Noted: 2017-07-14

## 2017-07-20 LAB
ABO + RH BLD: NORMAL
ANNOTATION COMMENT IMP: NORMAL
BLD PROD TYP BPU: NORMAL
BLD PROD TYP BPU: NORMAL
BLOOD GROUP ANTIBODIES SERPL: NORMAL
BLOOD GROUP ANTIBODIES SERPL: NORMAL
BPU ID: NORMAL
BPU ID: NORMAL
CROSSMATCH RESULT,%XM: NORMAL
CROSSMATCH RESULT,%XM: NORMAL
M TB IFN-G CD4+ BCKGRND COR BLD-ACNC: 0 IU/ML
M TB IFN-G CD4+ T-CELLS BLD-ACNC: 0.04 IU/ML
M TB TUBERC IFN-G BLD QL: NEGATIVE
M TB TUBERC IGNF/MITOGEN IGNF CONTROL: >10 IU/ML
PHYSICIAN INSTRUCTIO,%PI: NORMAL
QUANTIFERON NIL VALUE: 0.04 IU/ML
SERVICE CMNT-IMP: NORMAL
SPECIMEN EXP DATE BLD: NORMAL
STATUS OF UNIT,%ST: NORMAL
STATUS OF UNIT,%ST: NORMAL
UNIT DIVISION, %UDIV: 0
UNIT DIVISION, %UDIV: 0

## 2017-07-20 PROCEDURE — 74011000258 HC RX REV CODE- 258: Performed by: INTERNAL MEDICINE

## 2017-07-20 PROCEDURE — 74011250636 HC RX REV CODE- 250/636: Performed by: INTERNAL MEDICINE

## 2017-07-20 PROCEDURE — 74011250637 HC RX REV CODE- 250/637: Performed by: INTERNAL MEDICINE

## 2017-07-20 PROCEDURE — 74011250636 HC RX REV CODE- 250/636: Performed by: OBSTETRICS & GYNECOLOGY

## 2017-07-20 PROCEDURE — 74011250637 HC RX REV CODE- 250/637: Performed by: OBSTETRICS & GYNECOLOGY

## 2017-07-20 RX ORDER — MESALAMINE 1.2 G/1
2.4 TABLET, DELAYED RELEASE ORAL
Qty: 40 TAB | Refills: 3 | Status: SHIPPED | OUTPATIENT
Start: 2017-07-20

## 2017-07-20 RX ORDER — PANTOPRAZOLE SODIUM 40 MG/1
40 TABLET, DELAYED RELEASE ORAL DAILY
Qty: 40 TAB | Refills: 2 | Status: SHIPPED | OUTPATIENT
Start: 2017-07-20

## 2017-07-20 RX ORDER — FOLIC ACID 1 MG/1
2 TABLET ORAL DAILY
Status: DISCONTINUED | OUTPATIENT
Start: 2017-07-20 | End: 2017-07-20 | Stop reason: HOSPADM

## 2017-07-20 RX ORDER — PREDNISONE 10 MG/1
TABLET ORAL
Qty: 126 TAB | Refills: 0 | Status: ON HOLD | OUTPATIENT
Start: 2017-07-20 | End: 2017-09-24

## 2017-07-20 RX ORDER — OXYCODONE AND ACETAMINOPHEN 5; 325 MG/1; MG/1
1 TABLET ORAL
Qty: 15 TAB | Refills: 0 | Status: SHIPPED | OUTPATIENT
Start: 2017-07-20 | End: 2017-09-25

## 2017-07-20 RX ORDER — FOLIC ACID 1 MG/1
2 TABLET ORAL DAILY
Qty: 40 TAB | Refills: 3 | Status: ON HOLD | OUTPATIENT
Start: 2017-07-20 | End: 2017-09-24

## 2017-07-20 RX ADMIN — OXYCODONE HYDROCHLORIDE AND ACETAMINOPHEN 2 TABLET: 5; 325 TABLET ORAL at 09:59

## 2017-07-20 RX ADMIN — OXYCODONE HYDROCHLORIDE AND ACETAMINOPHEN 2 TABLET: 5; 325 TABLET ORAL at 18:16

## 2017-07-20 RX ADMIN — OXYCODONE HYDROCHLORIDE AND ACETAMINOPHEN 2 TABLET: 5; 325 TABLET ORAL at 00:36

## 2017-07-20 RX ADMIN — MESALAMINE 500 MG: 250 CAPSULE ORAL at 13:07

## 2017-07-20 RX ADMIN — SODIUM CHLORIDE, SODIUM LACTATE, POTASSIUM CHLORIDE, AND CALCIUM CHLORIDE 125 ML/HR: 600; 310; 30; 20 INJECTION, SOLUTION INTRAVENOUS at 13:04

## 2017-07-20 RX ADMIN — METHYLPREDNISOLONE SODIUM SUCCINATE 40 MG: 40 INJECTION, POWDER, FOR SOLUTION INTRAMUSCULAR; INTRAVENOUS at 14:03

## 2017-07-20 RX ADMIN — PIPERACILLIN AND TAZOBACTAM 3.38 G: 3; .375 INJECTION, POWDER, FOR SOLUTION INTRAVENOUS at 14:07

## 2017-07-20 RX ADMIN — SODIUM CHLORIDE, SODIUM LACTATE, POTASSIUM CHLORIDE, AND CALCIUM CHLORIDE 125 ML/HR: 600; 310; 30; 20 INJECTION, SOLUTION INTRAVENOUS at 04:56

## 2017-07-20 RX ADMIN — PIPERACILLIN AND TAZOBACTAM 3.38 G: 3; .375 INJECTION, POWDER, FOR SOLUTION INTRAVENOUS at 06:11

## 2017-07-20 RX ADMIN — METHYLPREDNISOLONE SODIUM SUCCINATE 40 MG: 40 INJECTION, POWDER, FOR SOLUTION INTRAMUSCULAR; INTRAVENOUS at 06:02

## 2017-07-20 RX ADMIN — MESALAMINE 500 MG: 250 CAPSULE ORAL at 17:24

## 2017-07-20 RX ADMIN — MESALAMINE 500 MG: 250 CAPSULE ORAL at 09:54

## 2017-07-20 RX ADMIN — FOLIC ACID 2 MG: 1 TABLET ORAL at 10:02

## 2017-07-20 RX ADMIN — MESALAMINE 500 MG: 250 CAPSULE ORAL at 00:27

## 2017-07-20 NOTE — PROGRESS NOTES
High Risk Obstetrics Progress Note    Name: Nataliia Blair MRN: [id number]  SSN: [id number]    YOB: 1995  Age: 24 y.o. Sex: female      Subjective:      LOS: 6 days    Estimated Date of Delivery: 17   Gestational Age Today: 18w2d     Patient was seen by me earlier today, awaiting input from GI. Patient admitted for ulcerative colitis flare with nausea/vomiting/bloody diarrhea/abdominal pain. States she feels a lot better since admission, particularly with addition IV steroids. She still feels like her stool frequency is a lot but she is tolerating a full regular diet and is keeping it down for the most part. She is interested in going home today. Objective:     Vitals:  Blood pressure 111/62, pulse 62, temperature 99.2 °F (37.3 °C), resp. rate 20, height 5' (1.524 m), weight 56.2 kg (123 lb 12.8 oz), SpO2 95 %, not currently breastfeeding. Temp (24hrs), Av.1 °F (36.7 °C), Min:97.7 °F (36.5 °C), Max:99.2 °F (86.6 °C)    Systolic (83LXW), UCU:270 , Min:91 , URS:833      Diastolic (98DXH), XDC:88, Min:48, Max:62       Intake and Output:          Physical Exam:  Deferred       Membranes:  Intact    Uterine Activity:  None    Fetal Heart Rate:  dopplers        Labs: No results found for this or any previous visit (from the past 36 hour(s)). Assessment and Plan:       Active Problems:    Ulcerative pancolitis with rectal bleeding ([address] (2017)      Hyperemesis arising during pregnancy (2017)      Previous  section (2017)       --Discharge home this evening per GI's note at patient's insistence  --Home with Rx for:  Folic acid 2mg daily     Zofran 4 mg prn     Protonix 40 mg q day     Prednisone taper as outline in Dr. Peterson Hairston note     Lialda 1.2 mg 2 tabs daily   --Patient elects to follow up with Dr. Alesha Irene @ [facility] and will call to schedule an appt  --Patient to follow up with Dr. Carol Gasca in 1-2 weeks    Signed By: Ainsley Chou MD     2017

## 2017-07-20 NOTE — PROGRESS NOTES
Bedside and Verbal shift change report given to SALLY Pearson RN (oncoming nurse) by Chela Mathis RN (offgoing nurse). Report included the following information SBAR, Kardex, MAR and Recent Results. 1700 GI specialist to bedside. 838 Nellie Curtis wrote orders for discharge,follow up and prescriptions. 1830 Reviewed discharge instructions,follow up instructions and prescription information. IV discontinued after Zosyn completed. Given 2 percocet for abdominal pain. Waiting for family to arrive for discharge home. No further questions at this time.

## 2017-07-20 NOTE — PROGRESS NOTES
[address] [facility]  [address] [facility] [address]  [address] [facility], [address]  [phone]                GI PROGRESS NOTE      NAME:   Harrison Jones   :    1995   MRN:    [id number]     Assessment/Plan   Patient with Ulcerative pancolitis who is 17 wks pregnant with abdominal pain, bloody diarrhea and n/v. C diff and stool cultures are negative. Symptoms most likely 2/2 hyperemesis 2/2 pregnancy and ulcerative colitis flare. After a long discussion, patient is willing to start treatment for Ulcerative colitis. Symptoms persistent on IV antibiotics, but improved significantly after initiation of IV steroids.  - Continue PPI and Zofran  -- Hep B serologies are negative. TB testing is pending.  - IV steroids - plan to switch to oral in am. Patient insisting on leaving tonight. Will need Prednisone taper - 40mg daily x 1 wk; 35mg daily x 1 wk; 30mg daily x 1 wk; 25mg daily x 1 wk; 20mg daily x 1 wk; 15mg daily x 1 wk; 10mg daily x 1 wk; 5mg daily x 1 wk and stop  - Needs Lialda 1.2gm 2 tabs daily   - f/u with me in 1-2 weeks     Anemia - likely related to the above, H&H improved after transfusion      Patient Active Problem List   Diagnosis Code    [facility] with [facility] bleeding ([facility] K51.011    Hyperemesis arising during pregnancy O21.0    Previous  section Z98.891       Subjective:     No n/v; 2-3 BMs, no abdominal pain. Due to family reasons patient insisting on leaving tonight.      Review of Systems    Constitutional: negative fever, negative chills, negative weight loss  Eyes:   negative visual changes  ENT:   negative sore throat, tongue or lip swelling  Respiratory:  negative cough, negative dyspnea  Cards:  negative for chest pain, palpitations, lower extremity edema  GI:   See HPI  :  negative for frequency, dysuria  Integument:  negative for rash and pruritus  Heme:  negative for easy bruising and gum/nose bleeding  Musculoskel: negative for myalgias,  back pain and muscle weakness  Neuro:   negative for headaches, dizziness, vertigo  Psych: negative for feelings of anxiety, depression           Objective:     VITALS:   Last 24hrs VS reviewed since prior hospitalist progress note. Most recent are:  Visit Vitals    /57 (BP 1 Location: Right arm, BP Patient Position: At rest)    Pulse 68    Temp 97.8 °F (36.6 °C)    Resp 16    Ht 5' (1.524 m)    Wt 56.2 kg (123 lb 12.8 oz)    SpO2 95%    Breastfeeding No    BMI 24.18 kg/m2     No intake or output data in the 24 hours ending 07/20/17 1613     PHYSICAL EXAM:  General   well developed, well nourished,in no acute distress  EENT  Normocephalic, Atraumatic, PERRLA, EOMI, sclera clear, pharynx normal  Respiratory   Clear To Auscultation bilaterally - no wheezes, rales, rhonchi, or crackles  Cardiology  Regular Rate and Rythmn  - no murmurs, rubs or gallops  Abdominal  Soft, non-tender, non-distended, positive bowel sounds, no palpable mass  Extremities  No clubbing, cyanosis, or edema. Skin  Normal skin turgor. No rashes or skin ulcers noted  Neurological  No focal neurological deficits noted  Psychological  Oriented x 3. Normal affect. Lab Data No results found for this or any previous visit (from the past 12 hour(s)).       Medications: Reviewed    PMH/SH reviewed - no change compared to H&P  Attending Physician: Jeffery Carranza MD   Date/Time:  7/20/2017

## 2017-07-20 NOTE — DISCHARGE SUMMARY
Antepartum  Discharge Summary     Patient ID:  Vera Chase  [id number]  37 y.o.  1995    Admit date: 2017    Discharge date: 2017    Admission Diagnoses:    Patient Active Problem List   Diagnosis Code    Ulcerative pancolitis with rectal bleeding ([facility] K51.011    Hyperemesis arising during pregnancy O21.0    Previous  section Z98.891       Discharge Diagnoses: There are no discharge diagnoses documented for the most recent discharge. Patient Active Problem List   Diagnosis Code    Ulcerative pancolitis with rectal bleeding ([facility] K51.011    Hyperemesis arising during pregnancy O21.0    Previous  section Z98.891       Consultations for this admission: Gastroenterology, Maternal Fetal Medicine, Nutrition    Hospital Course:  Jack Cesar is a 23 yo  who was admitted through the ER @ 17 4/7 wks with nausea/vomiting/bloody diarrhea/abdominal pain. She gave a history of Crohn's disease on no medication (diagnosis changed to ulcerative pancolitis by Dr. Hazel Montes after review of records) with previous admission earlier this pregnancy due to a flare. Patient was admitted to the [facility] service and GI was consulted. She was started on empiric IV antibiotics for the flare. C. Diff and stool cultures as well as hepatitis B returned negative. Due to persistent symptoms despite IV antibiotics, she was started on IV steroids and improvement was noted. She was also started on mesalamine 500 mg QID. She was noted to be anemic on admission with a hemoglobin of 6.9 and underwent transfusion of 1 unit PRBCs with resultant hemoglobin of 9.2. Electrolytes showed hypokalemia which was adequately repleted. Nutrition consulted and recommended low fiber diet until UC flare subsides and addition of Ensure supplements. MFM consulted and had normal anatomy of fetus and agreed with GI recommendation for treatment. Patient does not tolerate PO iron even in PNV but did tolerate folic acid 2mg PO. MFM discussed the potential need for IV iron at some point during the pregnancy. She is to follow up with MFM q 4 weeks or more regularly if UC flares persist.    Disposition: Home or self care    Discharged Condition: stable    Patient plans to return for changes in her condition or the condition of the baby or for delivery of the baby. Patient Instructions:   Current Discharge Medication List      START taking these medications    Details   folic acid (FOLVITE) 1 mg tablet Take 2 Tabs by mouth daily. Qty: 40 Tab, Refills: 3      oxyCODONE-acetaminophen (PERCOCET) 5-325 mg per tablet Take 1 Tab by mouth every six (6) hours as needed. Max Daily Amount: 4 Tabs. Qty: 15 Tab, Refills: 0      pantoprazole (PROTONIX) 40 mg tablet Take 1 Tab by mouth daily. Qty: 40 Tab, Refills: 2      mesalamine (LIALDA) 1.2 gram delayed release tablet Take 2 Tabs by mouth Daily (before breakfast). Qty: 40 Tab, Refills: 3      predniSONE (DELTASONE) 10 mg tablet Take 40 mg by mouth daily x 1 week, then  35 mg by mouth daily x 1 week, then  30 mg by mouth daily x 1 week, then  25 mg by mouth daily x 1 week, then  20 mg by mouth daily x 1 week, then  15 mg by mouth daily x 1 week, then  10 mg by mouth daily x 1 week, then  5 mg by mouth daily x 1 week  Qty: 126 Tab, Refills: 0         CONTINUE these medications which have NOT CHANGED    Details   DOXYLAMINE SUCCINATE/VIT B6 (DICLEGIS PO) Take 1 Tab by mouth daily. ondansetron (ZOFRAN ODT) 4 mg disintegrating tablet Take 1 Tab by mouth every eight (8) hours as needed for Nausea. Qty: 10 Tab, Refills: 0      promethazine (PHENERGAN) 25 mg tablet Take 1 Tab by mouth every six (6) hours as needed.   Qty: 12 Tab, Refills: 0         STOP taking these medications       loperamide (IMODIUM) 2 mg capsule Comments:   Reason for Stopping:             Activity: Activity as tolerated  Diet: Regular Diet, Low Fiber with Ensure supplements    Follow-up with   Follow-up Appointments Procedures    FOLLOW UP VISIT Appointment in: Other (Specify) 1-2 weeks with Dr. Angella Call and 1-2 weeks with Dr. Beatrice Handy (patient to call to make appts)     1-2 weeks with Dr. Angella Call and   1-2 weeks with Dr. Beatrice Handy    (patient to call to make appts)     Standing Status:   Standing     Number of Occurrences:   1     Order Specific Question:   Appointment in     Answer:    Other (Specify)        Signed:  Simran Montague MD  7/20/2017  5:19 PM

## 2017-07-20 NOTE — DISCHARGE INSTRUCTIONS
Name: Natasha Thao  YOB: 1995  Primary Diagnosis: Active Problems:    Ulcerative pancolitis with rectal bleeding ([address] (2017)      Hyperemesis arising during pregnancy (2017)      Previous  section (2017)             General:         Diet/Diet Restrictions:      Anything you like/tolerate    Physical Activity / Restrictions / Safety:     * Activity at home is based on how strong your  labor has been, You should follow the following activity guidelines. As tolerated, avoid heavy lifting. Discharge Instructions/ Special Treatment/ Home Care Needs:     Call your provider if:   Uterine cramping (menstrual-like cramps, intermittent or constant   Uterine contractions every 10-15 minutes or more frequently   Low abdominal pressure ( pelvic pressure)   Dull low backache (intermittent or constant)   Increase or change in vaginal discharge   Feeling that the baby is \"pushing down\"   Abdominal cramping with or without diarrhea  If any of these symptoms are experienced, stop what you are doing, lie down on your side, drink two to three glasses of water and wait one hour. If the symptoms persist or get worse, call your provider. Pain Management:           Signed By: Nan Tran RN                                                                                                   Date: 2017 Time: 5:36 PM    Discharge Checklist-NURSING TO COMPLETE:     Date and Time of Discharge: Date: 2017 Time: 5:36 PM    Return of:   Dental Appliance: Dental Appliances: None  Vision: Visual Aid: None  Hearing Aid:    Jewelry: Jewelry: None  Clothing: Clothing: Footwear, With patient, At bedside, Pants, Shirt, Slippers  Other Valuables:  Other Valuables: Purse  Valuables sent to safe:      Prescription Given: yes  Medication Instruction Sheet(s), including side effects, provided: yes    Accompanied By: Family    Mode of Transportation:    Discharge Disposition: Home    I have had the opportunity to make my options or choices for discharge. I have received and understand these instructions. These are general instructions for a healthy lifestyle:    No smoking/ No tobacco products/ Avoid exposure to second hand smoke    Surgeon General's Warning:  Quitting smoking now greatly reduces serious risk to your health. Obesity, smoking, and sedentary lifestyle greatly increases your risk for illness    A healthy diet, regular physical exercise & weight monitoring are important for maintaining a healthy lifestyle    Recognize signs and symptoms of STROKE:    F-face looks uneven    A-arms unable to move or move unevenly    S-speech slurred or non-existent    T-time-call 911 as soon as signs and symptoms begin-DO NOT go       Back to bed or wait to see if you get better-TIME IS BRAIN.

## 2017-07-20 NOTE — PROGRESS NOTES
Bedside shift change report given to [address] [place] [address] (oncoming nurse) by Marlene Garcia RN (offgoing nurse). Report included the following information SBAR, Procedure Summary, Intake/Output, MAR and Recent Results.

## 2017-08-02 NOTE — ED PROVIDER NOTES
HPI Comments: Pt is a 26 yo F who recently presented to the ED for a threatened  on . Pt did not receive a Rhogam shot at that time, and had not followed up with her OBGYN. Pt was called back to come to ED for her Rhogam shot. Past Medical History:   Diagnosis Date    Cro[facility]'s colitis ([facility]     H/O  section     PCOS (polycystic ovarian syndrome)     Placental abruption        Past Surgical History:   Procedure Laterality Date     DELIVERY ONLY      HX  SECTION      x 1         No family history on file. Social History     Social History    Marital status: SINGLE     Spouse name: N/A    Number of children: N/A    Years of education: N/A     Occupational History    Not on file. Social History Main Topics    Smoking status: Former Smoker    Smokeless tobacco: Never Used    Alcohol use No    Drug use: No    Sexual activity: Yes     Partners: Male     Birth control/ protection: None     Other Topics Concern    Not on file     Social History Narrative         ALLERGIES: Codeine    Review of Systems   Constitutional: Negative for fatigue and fever. HENT: Negative. Eyes: Negative. Respiratory: Negative for shortness of breath and wheezing. Cardiovascular: Negative for chest pain and leg swelling. Gastrointestinal: Negative for blood in stool, constipation, diarrhea, nausea and vomiting. Endocrine: Negative. Genitourinary: Negative for difficulty urinating and dysuria. Musculoskeletal: Negative. Skin: Negative for rash. Allergic/Immunologic: Negative. Neurological: Negative for weakness and numbness. Hematological: Negative. Psychiatric/Behavioral: Negative. There were no vitals filed for this visit. Physical Exam   Constitutional: She is oriented to person, place, and time. She appears well-developed and well-nourished. No distress. HENT:   Head: Normocephalic and atraumatic.    Mouth/Throat: Oropharynx is clear and moist.   Eyes: Conjunctivae and EOM are normal.   Neck: Neck supple. No JVD present. No tracheal deviation present. Cardiovascular: Normal rate, regular rhythm and intact distal pulses. Exam reveals no gallop and no friction rub. No murmur heard. Pulmonary/Chest: Effort normal and breath sounds normal. No stridor. No respiratory distress. She has no wheezes. Abdominal: Soft. Bowel sounds are normal. She exhibits no distension and no mass. There is no tenderness. There is no guarding. Musculoskeletal: Normal range of motion. She exhibits no edema or tenderness. No deformity   Neurological: She is alert and oriented to person, place, and time. She has normal strength. No focal deficits   Skin: Skin is warm, dry and intact. No rash noted. Psychiatric: She has a normal mood and affect. Her behavior is normal. Judgment and thought content normal.   Nursing note and vitals reviewed. [facility]  ED Course       Procedures      Plan:  Pt received Rhogam shot and was provided information on Rhogam. She will follow up with her OBGYN.      Harlow Goltz DO

## 2017-09-24 ENCOUNTER — HOSPITAL ENCOUNTER (OUTPATIENT)
Age: 22
Setting detail: OBSERVATION
Discharge: HOME | End: 2017-09-25
Attending: OBSTETRICS & GYNECOLOGY | Admitting: OBSTETRICS & GYNECOLOGY
Payer: MEDICAID

## 2017-09-24 VITALS
SYSTOLIC BLOOD PRESSURE: 92 MMHG | TEMPERATURE: 98 F | HEART RATE: 68 BPM | DIASTOLIC BLOOD PRESSURE: 46 MMHG | RESPIRATION RATE: 16 BRPM | WEIGHT: 141 LBS | BODY MASS INDEX: 27.68 KG/M2 | HEIGHT: 60 IN

## 2017-09-24 PROBLEM — O47.00 PRETERM CONTRACTIONS: Status: ACTIVE | Noted: 2017-09-24

## 2017-09-24 PROBLEM — Z34.90 PREGNANCY: Status: ACTIVE | Noted: 2017-09-24

## 2017-09-24 LAB
A1 MICROGLOB PLACENTAL VAG QL: NEGATIVE
APPEARANCE UR: CLEAR
BACTERIA URNS QL MICRO: NEGATIVE /HPF
BILIRUB UR QL: NEGATIVE
COLOR UR: NORMAL
CONTROL LINE PRESENT?: YES
DAILY QC (YES/NO)?: YES
EPITH CASTS URNS QL MICRO: NORMAL /LPF
EXPIRATION DATE: NORMAL
GLUCOSE UR STRIP.AUTO-MCNC: NEGATIVE MG/DL
HGB UR QL STRIP: NEGATIVE
HYALINE CASTS URNS QL MICRO: NORMAL /LPF (ref 0–5)
INTERNAL NEGATIVE CONTROL: NEGATIVE
KETONES UR QL STRIP.AUTO: NEGATIVE MG/DL
KIT LOT NO.: NORMAL
LEUKOCYTE ESTERASE UR QL STRIP.AUTO: NEGATIVE
NITRITE UR QL STRIP.AUTO: NEGATIVE
PH UR STRIP: 7 [PH] (ref 5–8)
PH, VAGINAL FLUID: 4.5 (ref 5–6.1)
PROT UR STRIP-MCNC: NEGATIVE MG/DL
RBC #/AREA URNS HPF: NORMAL /HPF (ref 0–5)
SP GR UR REFRACTOMETRY: <1.005 (ref 1–1.03)
UA: UC IF INDICATED,UAUC: NORMAL
UROBILINOGEN UR QL STRIP.AUTO: 0.2 EU/DL (ref 0.2–1)
WBC URNS QL MICRO: NORMAL /HPF (ref 0–4)

## 2017-09-24 PROCEDURE — 99285 EMERGENCY DEPT VISIT HI MDM: CPT

## 2017-09-24 PROCEDURE — 99218 HC RM OBSERVATION: CPT

## 2017-09-24 PROCEDURE — 74011250637 HC RX REV CODE- 250/637: Performed by: OBSTETRICS & GYNECOLOGY

## 2017-09-24 PROCEDURE — 84112 EVAL AMNIOTIC FLUID PROTEIN: CPT | Performed by: OBSTETRICS & GYNECOLOGY

## 2017-09-24 PROCEDURE — 83986 ASSAY PH BODY FLUID NOS: CPT | Performed by: OBSTETRICS & GYNECOLOGY

## 2017-09-24 PROCEDURE — 96360 HYDRATION IV INFUSION INIT: CPT

## 2017-09-24 PROCEDURE — 81001 URINALYSIS AUTO W/SCOPE: CPT | Performed by: OBSTETRICS & GYNECOLOGY

## 2017-09-24 PROCEDURE — 74011250636 HC RX REV CODE- 250/636: Performed by: OBSTETRICS & GYNECOLOGY

## 2017-09-24 PROCEDURE — 96361 HYDRATE IV INFUSION ADD-ON: CPT

## 2017-09-24 RX ORDER — SODIUM CHLORIDE 0.9 % (FLUSH) 0.9 %
5-10 SYRINGE (ML) INJECTION EVERY 8 HOURS
Status: DISCONTINUED | OUTPATIENT
Start: 2017-09-25 | End: 2017-09-25 | Stop reason: HOSPADM

## 2017-09-24 RX ORDER — SODIUM CHLORIDE 0.9 % (FLUSH) 0.9 %
5-10 SYRINGE (ML) INJECTION AS NEEDED
Status: DISCONTINUED | OUTPATIENT
Start: 2017-09-24 | End: 2017-09-25 | Stop reason: HOSPADM

## 2017-09-24 RX ORDER — ACETAMINOPHEN 500 MG
1000 TABLET ORAL ONCE
Status: COMPLETED | OUTPATIENT
Start: 2017-09-24 | End: 2017-09-24

## 2017-09-24 RX ORDER — SODIUM CHLORIDE, SODIUM LACTATE, POTASSIUM CHLORIDE, CALCIUM CHLORIDE 600; 310; 30; 20 MG/100ML; MG/100ML; MG/100ML; MG/100ML
125 INJECTION, SOLUTION INTRAVENOUS CONTINUOUS
Status: DISCONTINUED | OUTPATIENT
Start: 2017-09-24 | End: 2017-09-25 | Stop reason: HOSPADM

## 2017-09-24 RX ORDER — SODIUM CHLORIDE, SODIUM LACTATE, POTASSIUM CHLORIDE, CALCIUM CHLORIDE 600; 310; 30; 20 MG/100ML; MG/100ML; MG/100ML; MG/100ML
125 INJECTION, SOLUTION INTRAVENOUS CONTINUOUS
Status: DISCONTINUED | OUTPATIENT
Start: 2017-09-25 | End: 2017-09-25 | Stop reason: HOSPADM

## 2017-09-24 RX ADMIN — SODIUM CHLORIDE, SODIUM LACTATE, POTASSIUM CHLORIDE, AND CALCIUM CHLORIDE 125 ML/HR: 600; 310; 30; 20 INJECTION, SOLUTION INTRAVENOUS at 18:42

## 2017-09-24 RX ADMIN — ACETAMINOPHEN 1000 MG: 500 TABLET, FILM COATED ORAL at 21:25

## 2017-09-24 RX ADMIN — SODIUM CHLORIDE, SODIUM LACTATE, POTASSIUM CHLORIDE, AND CALCIUM CHLORIDE 1000 ML: 600; 310; 30; 20 INJECTION, SOLUTION INTRAVENOUS at 17:20

## 2017-09-24 NOTE — PROGRESS NOTES
Pt seen and examined. Asked to evaluate pt by Dr. Sorin Matute. Pt initally evaluated for ROM and was negative for rupture. Was going to be discharged then contractions were noted on monitor. Pt no really feely them, not painful. No bleeding no LOF, good FM.    30.0,12,28,892/36  Chest: CTA  CV: S1S2 RRR  Abd: gravid, non tender, non distended  SVE: def  [place]: irritable with Q5 contractions  FHT: Cat I    A/P Likely dehydration related uterine irritability, again no e/o ROM. Will IV hydrate with fluid bolus and reassess. If contractions persist will get urinalysis and check cervix.

## 2017-09-24 NOTE — IP AVS SNAPSHOT
[address] 
[phone] Patient: Nohemy Almanzar MRN: [id number] :1995 You are allergic to the following Allergen Reactions Codeine Anaphylaxis Recent Documentation Height Weight Breastfeeding? BMI OB Status Smoking Status 1.524 m 64 kg No 27.54 kg/m2 Pregnant Former Smoker Unresulted Labs Order Current Status PH BY NITRAZINE, POC Preliminary result RUPTURE OF FETAL MEMBRANES, POC Preliminary result Emergency Contacts Name Discharge Info Relation Home Work Mobile Bryson Harrington DISCHARGE CAREGIVER [3] Spouse [3] [phone] About your hospitalization You were admitted on:  2017 You last received care in the:  [facility] [place] You were discharged on:  2017 Unit phone number:  [phone] Why you were hospitalized Your primary diagnosis was:  Not on File Your diagnoses also included:  Pregnancy,  Contractions Providers Seen During Your Hospitalizations Provider Role Specialty Primary office phone Eduardo Barnes DO Attending Provider Obstetrics & Gynecology [phone] Your Primary Care Physician (PCP) Primary Care Physician Office Phone Office Fax NONE ** None ** ** None ** Follow-up Information Follow up With Details Comments Contact Info None   None (395) Patient stated that they have no PCP Current Discharge Medication List  
  
CONTINUE these medications which have NOT CHANGED Dose & Instructions Dispensing Information Comments Morning Noon Evening Bedtime  
 mesalamine 1.2 gram delayed release tablet Commonly known as:  Rita Hubbard Your last dose was: Your next dose is:    
   
   
 Dose:  2.4 g Take 2 Tabs by mouth Daily (before breakfast). Quantity:  40 Tab Refills:  3 ondansetron 4 mg disintegrating tablet Commonly known as:  ZOFRAN ODT Your last dose was: Your next dose is:    
   
   
 Dose:  4 mg Take 1 Tab by mouth every eight (8) hours as needed for Nausea. Quantity:  10 Tab Refills:  0  
     
   
   
   
  
 pantoprazole 40 mg tablet Commonly known as:  PROTONIX Your last dose was: Your next dose is:    
   
   
 Dose:  40 mg Take 1 Tab by mouth daily. Quantity:  40 Tab Refills:  2 PNV No12-Iron-FA-DSS-OM-3 29 mg iron-1 mg -50 mg Cpkd Your last dose was: Your next dose is:    
   
   
 Dose:  1 Tab Take 1 Tab by mouth daily. Indications: Pregnancy Refills:  0 STOP taking these medications   
 oxyCODONE-acetaminophen 5-325 mg per tablet Commonly known as:  PERCOCET Discharge Instructions  DISCHARGE INSTRUCTIONS Name: Kelly Rosaleseyad YOB: 1995 Primary Diagnosis: Active Problems: 
  Pregnancy (2017)  contractions (2017) Introduction: You have visited the hospital because you thought you were in  labor. These guidelines are for your information at home to help prevent repeated problems. In general, you should remember: 
? Empty your bladder every 2-3 hours. ? Avoid breast stimulation (including showers where the water stream is on your breasts)-this can cause contractions. ? Rest means lying down. ? Contractions and cramping happen more often in evening and nighttime. ? No intercourse or sexual stimulation without asking your doctor. ? Try to arrange for help with housework and . General: Any Bright Red blood the notify MD, Any Loss of clear fluid that keeps coming and fills a sanitary pad and possibly saturates legs and pants. Decreased Fetal Movement. Notify MD. Diet/Diet Restrictions: Follow your regularly prescribed diet Physical Activity / Restrictions / Safety:  
 
* Activity at home is based on how strong your  labor has been, You should follow the following activity guidelines. Activity based on symptoms: Lie down on your side if you feel contractions or tightening in your abdomen. Discharge Instructions/ Special Treatment/ Home Care Needs:  
 
Call your provider if: 
? Uterine cramping (menstrual-like cramps, intermittent or constant ? Uterine contractions every 10-15 minutes or more frequently ? Low abdominal pressure ( pelvic pressure) ? Dull low backache (intermittent or constant) ? Increase or change in vaginal discharge ? Feeling that the baby is \"pushing down\" ? Abdominal cramping with or without diarrhea If any of these symptoms are experienced, stop what you are doing, lie down on your side, drink two to three glasses of water and wait one hour. If the symptoms persist or get worse, call your provider. Pain Management:  
 
 
 
 
Signed By: Isaiah Reagan RN                                                                                                   Date: 2017 Time: 7:42 AM 
 
Discharge Checklist-NURSING TO COMPLETE:  
 
Date and Time of Discharge: Date: 2017 Time: 7:42 AM 
 
Return of:  
Dental Appliance:   
Vision: Visual Aid: None Hearing Aid:   
Jewelry:   
Clothing:   
Other Valuables:   
Valuables sent to safe:   
 
Prescription Given: no 
Medication Instruction Sheet(s), including side effects, provided: no 
 
Accompanied By: Family Mode of Transportation: 
 
Discharge Disposition: Home I have had the opportunity to make my options or choices for discharge. I have received and understand these instructions. These are general instructions for a healthy lifestyle: No smoking/ No tobacco products/ Avoid exposure to second hand smoke Surgeon General's Warning:  Quitting smoking now greatly reduces serious risk to your health. Obesity, smoking, and sedentary lifestyle greatly increases your risk for illness A healthy diet, regular physical exercise & weight monitoring are important for maintaining a healthy lifestyle Recognize signs and symptoms of STROKE: 
 
F-face looks uneven A-arms unable to move or move unevenly S-speech slurred or non-existent T-time-call 911 as soon as signs and symptoms begin-DO NOT go Back to bed or wait to see if you get better-TIME IS BRAIN. Discharge Orders None Introducing [facility]! [employer] introduces [employer] patient portal. Now you can access parts of your medical record, email your doctor's office, and request medication refills online. 1. In your internet browser, go to [email or web address] 2. Click on the First Time User? Click Here link in the Sign In box. You will see the New Member Sign Up page. 3. Enter your [employer] Access Code exactly as it appears below. You will not need to use this code after youve completed the sign-up process. If you do not sign up before the expiration date, you must request a new code. · [employer] Access Code: [email or web address] Expires: 12/24/2017  7:44 AM 
 
4. Enter the last four digits of your Social Security Number (xxxx) and Date of Birth (mm/dd/yyyy) as indicated and click Submit. You will be taken to the next sign-up page. 5. Create a [employer] ID. This will be your [employer] login ID and cannot be changed, so think of one that is secure and easy to remember. 6. Create a [employer] password. You can change your password at any time. 7. Enter your Password Reset Question and Answer. This can be used at a later time if you forget your password. 8. Enter your e-mail address. You will receive e-mail notification when new information is available in [address] 9. Click Sign Up. You can now view and download portions of your medical record. 10. Click the Download Summary menu link to download a portable copy of your medical information. If you have questions, please visit the Frequently Asked Questions section of the [employer] website. Remember, MyChart is NOT to be used for urgent needs. For medical emergencies, dial 911. Now available from your iPhone and Android! General Information Please provide this summary of care documentation to your next provider. Patient Signature:  ____________________________________________________________ Date:  ____________________________________________________________  
  
Lamar Wagoner Provider Signature:  ____________________________________________________________ Date:  ____________________________________________________________

## 2017-09-24 NOTE — IP AVS SNAPSHOT
[address] 
[phone] Patient: Nohemy Almanzar MRN: [id number] :1995 Current Discharge Medication List  
  
CONTINUE these medications which have NOT CHANGED Dose & Instructions Dispensing Information Comments Morning Noon Evening Bedtime  
 mesalamine 1.2 gram delayed release tablet Commonly known as:  Rita Hubbard Your last dose was: Your next dose is:    
   
   
 Dose:  2.4 g Take 2 Tabs by mouth Daily (before breakfast). Quantity:  40 Tab Refills:  3  
     
   
   
   
  
 ondansetron 4 mg disintegrating tablet Commonly known as:  ZOFRAN ODT Your last dose was: Your next dose is:    
   
   
 Dose:  4 mg Take 1 Tab by mouth every eight (8) hours as needed for Nausea. Quantity:  10 Tab Refills:  0  
     
   
   
   
  
 pantoprazole 40 mg tablet Commonly known as:  PROTONIX Your last dose was: Your next dose is:    
   
   
 Dose:  40 mg Take 1 Tab by mouth daily. Quantity:  40 Tab Refills:  2 PNV No12-Iron-FA-DSS-OM-3 29 mg iron-1 mg -50 mg Cpkd Your last dose was: Your next dose is:    
   
   
 Dose:  1 Tab Take 1 Tab by mouth daily. Indications: Pregnancy Refills:  0 STOP taking these medications   
 oxyCODONE-acetaminophen 5-325 mg per tablet Commonly known as:  PERCOCET

## 2017-09-24 NOTE — PROGRESS NOTES
1340 G 2 P 1 L 0 pt of Dr Bettie Denton for Dr Kimani Lo received to labor and delivery # 8 with complaint of small amounts of leaking from her vagina. Reports sexual intercourse last evening. States that she has had vincent gaytan contractions for the last week. Denies pain. Denies pregnancy complications but reports that in Dec 2016 she suffered a placental abruption, emergency  section and her baby  one hour after birth. .   1500 Dr Bettie Denton in evaluating patient and viewing strip. Speculum exam by her. Nitrazine negative. Amnisure obtained   Amnisure negative  152 Dr Bettie Denton states that fern is negative. Notified that patient has had more non painful contractions since exam. Continue to monitor and PO hydrate  1700 Dr Bettie Denton called in to inquire about patient status. Notified that patient is without complaint however she has periodic runs of contractions 3-5 minutes apart noticeable on fetal monitor. Pt aware of contractions but states she is not in pain. Ordered IV fluids with continued monitoring and informed that Dr Oliver Aparicio will assume care. 1720 IV started and bolus initiated. Dr Delcid Flair in to evaluate patient. 193 Bedside and Verbal shift change report given to Gloria Shore RN (oncoming nurse) by Anamika Henning RN (offgoing nurse). Report included the following information SBAR and Recent Results.

## 2017-09-25 PROCEDURE — 99218 HC RM OBSERVATION: CPT

## 2017-09-25 PROCEDURE — 96361 HYDRATE IV INFUSION ADD-ON: CPT

## 2017-09-25 NOTE — PROGRESS NOTES
Pt re-evaluated. Still having contractions, but not feeling them, q 5-10 min, after IVF bolus. Urinalysis negative for infection. 27,88,84,44/08  SVE: cl/thick/hi  FHT: reactive  [place]: Q5-10      A/  at 27 5/7 weeks with h/o of abruption and fetal demise with first pregnancy. No e/o PTL or ROM, but given pt's h/o of abruption will watch over night for worsening contractions, if remains unchanged in AM will d/c in morning.

## 2017-09-25 NOTE — DISCHARGE INSTRUCTIONS
DISCHARGE INSTRUCTIONS    Name: Janice Stephenson  YOB: 1995  Primary Diagnosis: Active Problems:    Pregnancy (2017)       contractions (2017)        Introduction: You have visited the hospital because you thought you were in  labor. These guidelines are for your information at home to help prevent repeated problems. In general, you should remember:   Empty your bladder every 2-3 hours.  Avoid breast stimulation (including showers where the water stream is on your breasts)-this can cause contractions.  Rest means lying down.  Contractions and cramping happen more often in evening and nighttime.  No intercourse or sexual stimulation without asking your doctor.  Try to arrange for help with housework and . General:     Any Bright Red blood the notify MD, Any Loss of clear fluid that keeps coming and fills a sanitary pad and possibly saturates legs and pants. Decreased Fetal Movement. Notify MD.    Diet/Diet Restrictions: Follow your regularly prescribed diet    Physical Activity / Restrictions / Safety:     * Activity at home is based on how strong your  labor has been, You should follow the following activity guidelines. Activity based on symptoms: Lie down on your side if you feel contractions or tightening in your abdomen. Discharge Instructions/ Special Treatment/ Home Care Needs:     Call your provider if:   Uterine cramping (menstrual-like cramps, intermittent or constant   Uterine contractions every 10-15 minutes or more frequently   Low abdominal pressure ( pelvic pressure)   Dull low backache (intermittent or constant)   Increase or change in vaginal discharge   Feeling that the baby is \"pushing down\"   Abdominal cramping with or without diarrhea  If any of these symptoms are experienced, stop what you are doing, lie down on your side, drink two to three glasses of water and wait one hour.  If the symptoms persist or get worse, call your provider. Pain Management:           Signed By: Eleonora Marrero RN                                                                                                   Date: 9/25/2017 Time: 7:42 AM    Discharge Checklist-NURSING TO COMPLETE:     Date and Time of Discharge: Date: 9/25/2017 Time: 7:42 AM    Return of:   Dental Appliance:    Vision: Visual Aid: None  Hearing Aid:    Jewelry:    Clothing:    Other Valuables:    Valuables sent to safe:      Prescription Given: no  Medication Instruction Sheet(s), including side effects, provided: no    Accompanied By: Family    Mode of Transportation:    Discharge Disposition: Home    I have had the opportunity to make my options or choices for discharge. I have received and understand these instructions. These are general instructions for a healthy lifestyle:    No smoking/ No tobacco products/ Avoid exposure to second hand smoke    Surgeon General's Warning:  Quitting smoking now greatly reduces serious risk to your health. Obesity, smoking, and sedentary lifestyle greatly increases your risk for illness    A healthy diet, regular physical exercise & weight monitoring are important for maintaining a healthy lifestyle    Recognize signs and symptoms of STROKE:    F-face looks uneven    A-arms unable to move or move unevenly    S-speech slurred or non-existent    T-time-call 911 as soon as signs and symptoms begin-DO NOT go       Back to bed or wait to see if you get better-TIME IS BRAIN.

## 2017-09-25 NOTE — PROGRESS NOTES
Progress Note  Patient seen, fetal heart rate and contraction pattern evaluated, patient examined. Pt sleeping comfortably. Reports no complaints. Denies further lof (had had intercourse the evening prior to coming in). Denies vb/contractions/cramping. No data found. Physical Exam:  Cervical Exam:  Closed/Thick/High on last exam  Membranes:  Intact  Uterine Activity: None  Fetal Heart Rate: Reactive  Baseline: 135-140s per minute  Variability: moderate  Accelerations: yes 10x10s  Decelerations: none    Assessment/Plan:  26 y/o  at 32 6/7 weeks with h/o emergent  for abruption with fetal demise. Premature contractions have resolved. No evidence of ROM. Category I tracing. Pt to have glucola done this morning in office and to see me on Friday as scheduled.  Will set up NICU consult this week if possible per pt request.

## 2017-09-25 NOTE — PROGRESS NOTES
1935- SBAR at bedside from AMBER Ascencio RN     3395- Dr. Miguel Messer at bedside [facility]) assessing fhr and toco (contraction patteren). Now more consistant about every 3- 5 minutes. Pt remains not notcing contractions, but does feel mild cramping. Pt with mild achy headache to bilateral orbitals. Received a one time dose of extra strength tylenol VORB. 2100- SVE completed- Closed/ Thick/High. Due to contractions remaining despite PO and IV hydration will keep Pt through the night on observation for now.   2102- Dr. Miguel Messer answered all Pt questions and reviewed plan of care with pt.    0750- Dr. Himanshu Best at Bedside discussing plan of care, discharge plans and need for pt and  to tour NICU this week at some point due to pt request.    6638- Discharge instructions given and explained to pt answering all questions

## 2017-09-26 NOTE — H&P
Obstetrics Admission H&P    ** late entry-- pt encounter 17 @ [address] [place] [address]  [id number]  1995    Chief Complaint:  Pregnancy and possible leaking of fluid    HPI: 25 y.o. female  27w6d with Estimated Date of Delivery: 17  Pregnancy has been complicated by none. Patient complains of possible leaking of fluid  for 1 days. She had IC the previous night and has had small amounts of intermittent vaginal leaking since then. She feels it is more than would be typical after IC and she is concerned. Has been having BH ctx all week, hasn't been concerned about them. Patient denies vaginal bleeding . Prior h/o acute abruption with emergent C/S and  demise approx. 1 hr later. ROS:  Pertinent items are noted in HPI. OB History      Para Term  AB Living    2 1  1  0    SAB TAB Ectopic Molar Multiple Live Births         1        Past Medical History:   Diagnosis Date    Anemia     Crohn's colitis ([facility] [place] [address]     H/O  section     Herpes simplex virus (HSV) infection     Infertility, female     PCOS (polycystic ovarian syndrome)     Placental abruption     Polycystic disease, ovaries     Rhesus isoimmunization affecting pregnancy      Past Surgical History:   Procedure Laterality Date     DELIVERY ONLY      HX  SECTION      x 1     Social History     Social History    Marital status: SINGLE     Spouse name: N/A    Number of children: N/A    Years of education: N/A     Occupational History    Not on file.      Social History Main Topics    Smoking status: Former Smoker     Quit date: 2017    Smokeless tobacco: Never Used    Alcohol use No    Drug use: No    Sexual activity: Yes     Partners: Male     Birth control/ protection: None     Other Topics Concern    Not on file     Social History Narrative     Family History   Problem Relation Age of Onset    Cancer Mother 32     Cervical    Migraines Mother     Psychiatric Disorder Mother     Cancer Maternal Grandmother      Cervical and ovarian    Hypertension Maternal Grandmother     Psychiatric Disorder Maternal Grandmother     Diabetes Maternal Grandfather     Hypertension Maternal Grandfather     Heart Disease Maternal Grandfather     Cancer Maternal Grandfather      Thyroid      Allergies   Allergen Reactions    Codeine Anaphylaxis     Prior to Admission Medications   Prescriptions Last Dose Informant Patient Reported? Taking? PNV No12-Iron-FA-DSS-OM-3 29 mg iron-1 mg -50 mg CPKD 9/24/2017 at 1000  Yes Yes   Sig: Take 1 Tab by mouth daily. Indications: Pregnancy   mesalamine (LIALDA) 1.2 gram delayed release tablet 9/24/2017 at 1000  No Yes   Sig: Take 2 Tabs by mouth Daily (before breakfast). ondansetron (ZOFRAN ODT) 4 mg disintegrating tablet Not Taking at Unknown time  No No   Sig: Take 1 Tab by mouth every eight (8) hours as needed for Nausea. pantoprazole (PROTONIX) 40 mg tablet 9/24/2017 at 1000  No Yes   Sig: Take 1 Tab by mouth daily. Facility-Administered Medications: None         Vitals:  No data found. No data recorded. I&O:                    Exam:  Patient without distress. Abdomen soft, non-tender               Fundus soft and non tender               Perineum No sign of blood or amniotic fluid               Lower extremities edema No               Cervical Exam:  Closed/Thick/High  Spec exam: NL physiologic white d/c, no pool. Neg nitrazine and fern. Neg Amnisure    Fetal Heart Rate: appropriate for gestational age  Uterine Activity: irregular, infrequent         Labs: No results found for this or any previous visit (from the past 24 hour(s)). Assessment and Plan:       IUP at 27 wks with h/o abruption now c/o possible ROM. Exam neg for ROM. No evidence PTL or cervical change. Monitor and anticipate d/c home soon. Addendum: Pt with increased ctx after pelvic exam.  Not painful but pt feels them, irreg q3-7 mins. Will PO hydrate, rest, prolonged monitoring and d/c home if ctx decrease in frequency. Discussed plan with pt.

## 2017-10-20 ENCOUNTER — HOSPITAL ENCOUNTER (OUTPATIENT)
Age: 22
Discharge: HOME | End: 2017-10-20
Attending: OBSTETRICS & GYNECOLOGY | Admitting: OBSTETRICS & GYNECOLOGY
Payer: MEDICAID

## 2017-10-20 VITALS
TEMPERATURE: 98.6 F | DIASTOLIC BLOOD PRESSURE: 50 MMHG | WEIGHT: 148.38 LBS | BODY MASS INDEX: 29.13 KG/M2 | SYSTOLIC BLOOD PRESSURE: 106 MMHG | RESPIRATION RATE: 18 BRPM | HEIGHT: 60 IN | HEART RATE: 72 BPM

## 2017-10-20 LAB — FIBRONECTIN FETAL VAG QL: NEGATIVE

## 2017-10-20 PROCEDURE — 82731 ASSAY OF FETAL FIBRONECTIN: CPT | Performed by: OBSTETRICS & GYNECOLOGY

## 2017-10-20 PROCEDURE — 99284 EMERGENCY DEPT VISIT MOD MDM: CPT

## 2017-10-20 PROCEDURE — 74011250636 HC RX REV CODE- 250/636: Performed by: OBSTETRICS & GYNECOLOGY

## 2017-10-20 PROCEDURE — 90715 TDAP VACCINE 7 YRS/> IM: CPT | Performed by: OBSTETRICS & GYNECOLOGY

## 2017-10-20 PROCEDURE — 90471 IMMUNIZATION ADMIN: CPT

## 2017-10-20 RX ADMIN — TETANUS TOXOID, REDUCED DIPHTHERIA TOXOID AND ACELLULAR PERTUSSIS VACCINE, ADSORBED 0.5 ML: 5; 2.5; 8; 8; 2.5 SUSPENSION INTRAMUSCULAR at 17:44

## 2017-10-20 NOTE — IP AVS SNAPSHOT
[address] 
[phone] Patient: Toño Jorge MRN: [id number] :1995 Current Discharge Medication List  
  
ASK your doctor about these medications Dose & Instructions Dispensing Information Comments Morning Noon Evening Bedtime [address] [place] Generic drug:  pediatric multivitamin no.49 Your last dose was: Your next dose is:    
   
   
 Dose:  2 Tab Take 2 Tabs by mouth daily. Indications: VITAMIN DEFICIENCY PREVENTION Refills:  0  
     
   
   
   
  
 mesalamine 1.2 gram delayed release tablet Commonly known as:  Lenor Hard Your last dose was: Your next dose is:    
   
   
 Dose:  2.4 g Take 2 Tabs by mouth Daily (before breakfast). Quantity:  40 Tab Refills:  3  
     
   
   
   
  
 ondansetron 4 mg disintegrating tablet Commonly known as:  ZOFRAN ODT Your last dose was: Your next dose is:    
   
   
 Dose:  4 mg Take 1 Tab by mouth every eight (8) hours as needed for Nausea. Quantity:  10 Tab Refills:  0  
     
   
   
   
  
 pantoprazole 40 mg tablet Commonly known as:  PROTONIX Your last dose was: Your next dose is:    
   
   
 Dose:  40 mg Take 1 Tab by mouth daily. Quantity:  40 Tab Refills:  2 PNV No12-Iron-FA-DSS-OM-3 29 mg iron-1 mg -50 mg Cpkd Your last dose was: Your next dose is:    
   
   
 Dose:  1 Tab Take 1 Tab by mouth daily. Indications: Pregnancy Refills:  0

## 2017-10-20 NOTE — IP AVS SNAPSHOT
2700 [facility] [address] [place] [address] 
[phone] Patient: Hernandez Doty MRN: [id number] :1995 You are allergic to the following Allergen Reactions Codeine Anaphylaxis Immunizations Administered for This Admission Name Date Tdap 10/20/2017 Recent Documentation Height Weight Breastfeeding? BMI OB Status Smoking Status 1.524 m 67.3 kg No 28.98 kg/m2 Pregnant Former Smoker Emergency Contacts Name Discharge Info Relation Home Work Mobile Bryson Harrington DISCHARGE CAREGIVER [3] Spouse [3] [phone] About your hospitalization You were admitted on:  2017 You last received care in the:  [facility] 3 LABOR & DELIVERY You were discharged on:  2017 Unit phone number:  [phone] Why you were hospitalized Your primary diagnosis was:  Not on File Providers Seen During Your Hospitalizations Provider Role Specialty Primary office phone Renzo Baeza DO Attending Provider Obstetrics & Gynecology [phone] Your Primary Care Physician (PCP) Primary Care Physician Office Phone Office Fax NONE ** None ** ** None ** Follow-up Information Follow up With Details Comments Contact Info Renzo Baeza DO Go to go to appointment as scheduled [id number] [place] [address] [address] [place] [address] 
[phone] Current Discharge Medication List  
  
ASK your doctor about these medications Dose & Instructions Dispensing Information Comments Morning Noon Evening Bedtime 701 [employer] Generic drug:  pediatric multivitamin no.49 Your last dose was: Your next dose is:    
   
   
 Dose:  2 Tab Take 2 Tabs by mouth daily. Indications: VITAMIN DEFICIENCY PREVENTION Refills:  0  
     
   
   
   
  
 mesalamine 1.2 gram delayed release tablet Commonly known as:  Bay Morales Your last dose was: Your next dose is:    
   
   
 Dose:  2.4 g Take 2 Tabs by mouth Daily (before breakfast). Quantity:  40 Tab Refills:  3  
     
   
   
   
  
 ondansetron 4 mg disintegrating tablet Commonly known as:  ZOFRAN ODT Your last dose was: Your next dose is:    
   
   
 Dose:  4 mg Take 1 Tab by mouth every eight (8) hours as needed for Nausea. Quantity:  10 Tab Refills:  0  
     
   
   
   
  
 pantoprazole 40 mg tablet Commonly known as:  PROTONIX Your last dose was: Your next dose is:    
   
   
 Dose:  40 mg Take 1 Tab by mouth daily. Quantity:  40 Tab Refills:  2 PNV No12-Iron-FA-DSS-OM-3 29 mg iron-1 mg -50 mg Cpkd Your last dose was: Your next dose is:    
   
   
 Dose:  1 Tab Take 1 Tab by mouth daily. Indications: Pregnancy Refills:  0 Discharge Instructions  DISCHARGE INSTRUCTIONS Name: Amanda Toth YOB: 1995 Primary Diagnosis: Active Problems: * No active hospital problems. * Introduction: You have visited the hospital because you thought you were in  labor. These guidelines are for your information at home to help prevent repeated problems. In general, you should remember: 
? Empty your bladder every 2-3 hours. ? Avoid breast stimulation (including showers where the water stream is on your breasts)-this can cause contractions. ? Rest means lying down. ? Contractions and cramping happen more often in evening and nighttime. ? No intercourse or sexual stimulation without asking your doctor. ? Try to arrange for help with housework and . General:  
 
 
 
Diet/Diet Restrictions:   
 
Anything you like/tolerate, Follow your regularly prescribed diet, Drink 8-10 glasses of water each day. Avoid beverages with caffeine.  and Eat fresh vegetables, fruits, bran cereal to avoid becoming constipated. Physical Activity / Restrictions / Safety:  
 
* Activity at home is based on how strong your  labor has been, You should follow the following activity guidelines. As tolerated, avoid heavy lifting. and Activity based on symptoms: Lie down on your side if you feel contractions or tightening in your abdomen. Discharge Instructions/ Special Treatment/ Home Care Needs:  
 
Call your provider if: 
? Uterine cramping (menstrual-like cramps, intermittent or constant ? Uterine contractions every 10-15 minutes or more frequently ? Low abdominal pressure ( pelvic pressure) ? Dull low backache (intermittent or constant) ? Increase or change in vaginal discharge ? Feeling that the baby is \"pushing down\" ? Abdominal cramping with or without diarrhea If any of these symptoms are experienced, stop what you are doing, lie down on your side, drink two to three glasses of water and wait one hour. If the symptoms persist or get worse, call your provider. Pain Management:  
 
 
 
 
Signed By: King Richmond RN                                                                                                   Date: 10/20/2017 Time: 5:36 PM 
 
Discharge Checklist-NURSING TO COMPLETE:  
 
Date and Time of Discharge: Date: 10/20/2017 Time: 5:36 PM 
 
Return of:  
Dental Appliance: Dental Appliances: None Vision: Visual Aid: None Hearing Aid:   
Jewelry: Jewelry: Elvia Lu Clothing: Clothing: Shirt, With patient, Undergarments, Pants, Footwear, At bedside Other Valuables: Other Valuables: Eliel, [address] [place] [address], NELI Chacon Valuables sent to safe: Personal Items Sent to Safe: none Prescription Given: no 
Medication Instruction Sheet(s), including side effects, provided: no 
 
Accompanied By: Family Mode of Transportation: 
 
Discharge Disposition: Home I have had the opportunity to make my options or choices for discharge.  I have received and understand these instructions. These are general instructions for a healthy lifestyle: No smoking/ No tobacco products/ Avoid exposure to second hand smoke Surgeon General's Warning:  Quitting smoking now greatly reduces serious risk to your health. Obesity, smoking, and sedentary lifestyle greatly increases your risk for illness A healthy diet, regular physical exercise & weight monitoring are important for maintaining a healthy lifestyle Recognize signs and symptoms of STROKE: 
 
F-face looks uneven A-arms unable to move or move unevenly S-speech slurred or non-existent T-time-call 911 as soon as signs and symptoms begin-DO NOT go Back to bed or wait to see if you get better-TIME IS BRAIN. Discharge Orders None Introducing [facility]! [facility] introduces [employer] patient portal. Now you can access parts of your medical record, email your doctor's office, and request medication refills online. 1. In your internet browser, go to [email or web address] 2. Click on the First Time User? Click Here link in the Sign In box. You will see the New Member Sign Up page. 3. Enter your [employer] Access Code exactly as it appears below. You will not need to use this code after youve completed the sign-up process. If you do not sign up before the expiration date, you must request a new code. · [employer] Access Code: [email or web address] Expires: 12/24/2017  7:44 AM 
 
4. Enter the last four digits of your Social Security Number (xxxx) and Date of Birth (mm/dd/yyyy) as indicated and click Submit. You will be taken to the next sign-up page. 5. Create a [employer] ID. This will be your [employer] login ID and cannot be changed, so think of one that is secure and easy to remember. 6. Create a [employer] password. You can change your password at any time. 7. Enter your Password Reset Question and Answer.  This can be used at a later time if you forget your password. 8. Enter your e-mail address. You will receive e-mail notification when new information is available in [address] 9. Click Sign Up. You can now view and download portions of your medical record. 10. Click the Download Summary menu link to download a portable copy of your medical information. If you have questions, please visit the Frequently Asked Questions section of the [employer] website. Remember, [employer] is NOT to be used for urgent needs. For medical emergencies, dial 911. Now available from your iPhone and Android! General Information Please provide this summary of care documentation to your next provider. Patient Signature:  ____________________________________________________________ Date:  ____________________________________________________________  
  
Robin Artist Provider Signature:  ____________________________________________________________ Date:  ____________________________________________________________

## 2017-10-20 NOTE — H&P
History & Physical    Name: Kathy Morrow MRN: [id number]  SSN: [id number]    YOB: 1995  Age: 25 y.o. Sex: female        Subjective:     Estimated Date of Delivery: 17  OB History    Para Term  AB Living   2 1  1  0   SAB TAB Ectopic Molar Multiple Live Births        1      # Outcome Date GA Lbr Jax/2nd Weight Sex Delivery Anes PTL Lv   2 Current            1  16 32w0d   M  Spinal N       Birth Comments: Placental abruption.  at 4 hour of age          Ms. Tanika Reza is admitted with pregnancy at 31w3d for prolonged monitoring for abdominal cramping/contractions. Pt reports feeling much better since admission. Cramping resolved. . Prenatal course was complicated by Crohn's, anemia s/p iron iv therapy today, rubella non-immune, h/o HSV, rh negative, h/o  for 30 week fetal demise for abruption. Please see prenatal records for details. Past Medical History:   Diagnosis Date    Anemia     Crohn's colitis ([facility] [address]     H/O  section     Herpes simplex virus (HSV) infection     just oral cold sores    Infertility, female     due to PCOS and chron's    PCOS (polycystic ovarian syndrome)     Placental abruption     Polycystic disease, ovaries     Rhesus isoimmunization affecting pregnancy      Past Surgical History:   Procedure Laterality Date     DELIVERY ONLY  2016     due to placental abruption; IUFD    HX  SECTION      x 1     Social History     Occupational History    Not on file.      Social History Main Topics    Smoking status: Former Smoker     Quit date: 2017    Smokeless tobacco: Never Used    Alcohol use No    Drug use: No    Sexual activity: Yes     Partners: Male     Birth control/ protection: None     Family History   Problem Relation Age of Onset    Cancer Mother 32     Cervical   [de-identified] Migraines Mother     Psychiatric Disorder Mother     Cancer Maternal Grandmother      Cervical and ovarian    Hypertension Maternal Grandmother     Psychiatric Disorder Maternal Grandmother     Diabetes Maternal Grandfather     Hypertension Maternal Grandfather     Heart Disease Maternal Grandfather     Cancer Maternal Grandfather      Thyroid        Allergies   Allergen Reactions    Codeine Anaphylaxis     Prior to Admission medications    Medication Sig Start Date End Date Taking? Authorizing Provider   pediatric multivitamin no.49 ([employer]) chew Take 2 Tabs by mouth daily. Indications: VITAMIN DEFICIENCY PREVENTION   Yes Historical Provider   pantoprazole (PROTONIX) 40 mg tablet Take 1 Tab by mouth daily. 17  Yes Cornelia Elena MD   mesalamine (LIALDA) 1.2 gram delayed release tablet Take 2 Tabs by mouth Daily (before breakfast). 17  Yes Cornelia Elena MD   PNV No12-Iron-FA-DSS-OM-3 29 mg iron-1 mg -50 mg CPKD Take 1 Tab by mouth daily. Indications: Pregnancy    Historical Provider   ondansetron (ZOFRAN ODT) 4 mg disintegrating tablet Take 1 Tab by mouth every eight (8) hours as needed for Nausea. 17   Karen Higgins PA-C        Review of Systems: Pertinent items are noted in HPI. Objective:     Vitals:  Vitals:    10/20/17 1459 10/20/17 1505   BP: 114/59    Pulse: 76    Resp: 18    Temp: 98.1 °F (36.7 °C)    Weight:  67.3 kg (148 lb 6 oz)   Height:  5' (1.524 m)        Physical Exam:  Patient without distress. Abdomen: soft, nontender, gravid  Membranes:  Intact  Fetal Heart Rate: Reactive    Prenatal Labs:   Lab Results   Component Value Date/Time    ABO/Rh(D) A NEGATIVE 2017 12:31 PM    HBsAg, External Negative 2017    ABO,Rh A negative 2017         Assessment/Plan:     Active Problems:    * No active hospital problems. *       Plan: 26 y/o  at 32 3/7 weeks here for prolonged monitoring for abdominal cramping/contractions s/p iv iron therapy today. Feeling much better. No evidence of labor and FFN negative. Category I tracing.  Discharge home with follow up in office as scheduled.      Signed By:  Jose Antonio Rueda, DO     October 20, 2017

## 2017-10-20 NOTE — PROGRESS NOTES
1453 - Pt admitted to [place] [address] with c/o UCs since 1300. Pt states she had an adverse rxn to an iron transfusion this morning. Pt states she was seen by Dr Juan Lo in the office prior to arriving in L&D and states that her cervix was closed and was having UCs in the office with Dr Juan Lo. 1648 - Spoke to Dr Juan Lo and informed her that FFN is negative and that patient is having occassional UCs with no complaints of pain at this time. Dr Juan Lo stated she would see patient soon. 1750 - I have reviewed discharge instructions with the patient. The patient verbalized understanding. TDAP vaccine information sheet given to pt.

## 2017-10-20 NOTE — DISCHARGE INSTRUCTIONS
DISCHARGE INSTRUCTIONS    Name: Una Can  YOB: 1995  Primary Diagnosis: Active Problems:    * No active hospital problems. *      Introduction: You have visited the hospital because you thought you were in  labor. These guidelines are for your information at home to help prevent repeated problems. In general, you should remember:   Empty your bladder every 2-3 hours.  Avoid breast stimulation (including showers where the water stream is on your breasts)-this can cause contractions.  Rest means lying down.  Contractions and cramping happen more often in evening and nighttime.  No intercourse or sexual stimulation without asking your doctor.  Try to arrange for help with housework and . General:         Diet/Diet Restrictions:      Anything you like/tolerate, Follow your regularly prescribed diet, Drink 8-10 glasses of water each day. Avoid beverages with caffeine. and Eat fresh vegetables, fruits, bran cereal to avoid becoming constipated. Physical Activity / Restrictions / Safety:     * Activity at home is based on how strong your  labor has been, You should follow the following activity guidelines. As tolerated, avoid heavy lifting. and Activity based on symptoms: Lie down on your side if you feel contractions or tightening in your abdomen. Discharge Instructions/ Special Treatment/ Home Care Needs:     Call your provider if:   Uterine cramping (menstrual-like cramps, intermittent or constant   Uterine contractions every 10-15 minutes or more frequently   Low abdominal pressure ( pelvic pressure)   Dull low backache (intermittent or constant)   Increase or change in vaginal discharge   Feeling that the baby is \"pushing down\"   Abdominal cramping with or without diarrhea  If any of these symptoms are experienced, stop what you are doing, lie down on your side, drink two to three glasses of water and wait one hour.  If the symptoms persist or get worse, call your provider. Pain Management:           Signed By: Pinky Engel RN                                                                                                   Date: 10/20/2017 Time: 5:36 PM    Discharge Checklist-NURSING TO COMPLETE:     Date and Time of Discharge: Date: 10/20/2017 Time: 5:36 PM    Return of:   Dental Appliance: Dental Appliances: None  Vision: Visual Aid: None  Hearing Aid:    Jewelry: Jewelry: Necklace  Clothing: Clothing: Shirt, With patient, Undergarments, Pants, Footwear, At bedside  Other Valuables: Other Valuables: Cell Phone, [address] [place] [address] sent to safe: Personal Items Sent to Safe: none    Prescription Given: no  Medication Instruction Sheet(s), including side effects, provided: no    Accompanied By: Family    Mode of Transportation:    Discharge Disposition: Home    I have had the opportunity to make my options or choices for discharge. I have received and understand these instructions. These are general instructions for a healthy lifestyle:    No smoking/ No tobacco products/ Avoid exposure to second hand smoke    Surgeon General's Warning:  Quitting smoking now greatly reduces serious risk to your health. Obesity, smoking, and sedentary lifestyle greatly increases your risk for illness    A healthy diet, regular physical exercise & weight monitoring are important for maintaining a healthy lifestyle    Recognize signs and symptoms of STROKE:    F-face looks uneven    A-arms unable to move or move unevenly    S-speech slurred or non-existent    T-time-call 911 as soon as signs and symptoms begin-DO NOT go       Back to bed or wait to see if you get better-TIME IS BRAIN.

## 2017-10-25 ENCOUNTER — HOSPITAL ENCOUNTER (EMERGENCY)
Age: 22
Discharge: STILL ADMITTED OR ADMINISTRATIVE | End: 2017-10-25
Attending: STUDENT IN AN ORGANIZED HEALTH CARE EDUCATION/TRAINING PROGRAM
Payer: MEDICAID

## 2017-10-25 ENCOUNTER — HOSPITAL ENCOUNTER (EMERGENCY)
Age: 22
Discharge: HOME | End: 2017-10-26
Attending: OBSTETRICS & GYNECOLOGY | Admitting: OBSTETRICS & GYNECOLOGY
Payer: MEDICAID

## 2017-10-25 VITALS
RESPIRATION RATE: 16 BRPM | BODY MASS INDEX: 28.98 KG/M2 | DIASTOLIC BLOOD PRESSURE: 63 MMHG | WEIGHT: 147.6 LBS | HEIGHT: 60 IN | HEART RATE: 76 BPM | TEMPERATURE: 98.2 F | SYSTOLIC BLOOD PRESSURE: 114 MMHG

## 2017-10-25 LAB
APPEARANCE UR: ABNORMAL
BACTERIA URNS QL MICRO: NEGATIVE /HPF
BILIRUB UR QL: NEGATIVE
COLOR UR: ABNORMAL
EPITH CASTS URNS QL MICRO: ABNORMAL /LPF
ERYTHROCYTE [DISTWIDTH] IN BLOOD BY AUTOMATED COUNT: 23.8 % (ref 11.5–14.5)
FIBRONECTIN FETAL VAG QL: NEGATIVE
GLUCOSE UR STRIP.AUTO-MCNC: NEGATIVE MG/DL
HCT VFR BLD AUTO: 31.3 % (ref 35–47)
HGB BLD-MCNC: 9.5 G/DL (ref 11.5–16)
HGB UR QL STRIP: NEGATIVE
HYALINE CASTS URNS QL MICRO: ABNORMAL /LPF (ref 0–5)
KETONES UR QL STRIP.AUTO: NEGATIVE MG/DL
LEUKOCYTE ESTERASE UR QL STRIP.AUTO: NEGATIVE
MCH RBC QN AUTO: 25.6 PG (ref 26–34)
MCHC RBC AUTO-ENTMCNC: 30.4 G/DL (ref 30–36.5)
MCV RBC AUTO: 84.4 FL (ref 80–99)
NITRITE UR QL STRIP.AUTO: NEGATIVE
PH UR STRIP: 7 [PH] (ref 5–8)
PLATELET # BLD AUTO: 184 K/UL (ref 150–400)
PROT UR STRIP-MCNC: NEGATIVE MG/DL
RBC # BLD AUTO: 3.71 M/UL (ref 3.8–5.2)
RBC #/AREA URNS HPF: ABNORMAL /HPF (ref 0–5)
SP GR UR REFRACTOMETRY: 1.02 (ref 1–1.03)
UA: UC IF INDICATED,UAUC: ABNORMAL
UROBILINOGEN UR QL STRIP.AUTO: 1 EU/DL (ref 0.2–1)
WBC # BLD AUTO: 7.8 K/UL (ref 3.6–11)
WBC URNS QL MICRO: ABNORMAL /HPF (ref 0–4)

## 2017-10-25 PROCEDURE — 99284 EMERGENCY DEPT VISIT MOD MDM: CPT

## 2017-10-25 PROCEDURE — 82731 ASSAY OF FETAL FIBRONECTIN: CPT | Performed by: OBSTETRICS & GYNECOLOGY

## 2017-10-25 PROCEDURE — 85460 HEMOGLOBIN FETAL: CPT | Performed by: OBSTETRICS & GYNECOLOGY

## 2017-10-25 PROCEDURE — 74011250636 HC RX REV CODE- 250/636: Performed by: OBSTETRICS & GYNECOLOGY

## 2017-10-25 PROCEDURE — 96360 HYDRATION IV INFUSION INIT: CPT

## 2017-10-25 PROCEDURE — 85027 COMPLETE CBC AUTOMATED: CPT | Performed by: OBSTETRICS & GYNECOLOGY

## 2017-10-25 PROCEDURE — 75810000275 HC EMERGENCY DEPT VISIT NO LEVEL OF CARE

## 2017-10-25 PROCEDURE — 36415 COLL VENOUS BLD VENIPUNCTURE: CPT | Performed by: OBSTETRICS & GYNECOLOGY

## 2017-10-25 PROCEDURE — 81001 URINALYSIS AUTO W/SCOPE: CPT | Performed by: OBSTETRICS & GYNECOLOGY

## 2017-10-25 RX ORDER — SODIUM CHLORIDE, SODIUM LACTATE, POTASSIUM CHLORIDE, CALCIUM CHLORIDE 600; 310; 30; 20 MG/100ML; MG/100ML; MG/100ML; MG/100ML
125 INJECTION, SOLUTION INTRAVENOUS CONTINUOUS
Status: DISCONTINUED | OUTPATIENT
Start: 2017-10-25 | End: 2017-10-26 | Stop reason: HOSPADM

## 2017-10-25 RX ADMIN — SODIUM CHLORIDE, SODIUM LACTATE, POTASSIUM CHLORIDE, AND CALCIUM CHLORIDE 1000 ML: 600; 310; 30; 20 INJECTION, SOLUTION INTRAVENOUS at 21:51

## 2017-10-25 RX ADMIN — SODIUM CHLORIDE, SODIUM LACTATE, POTASSIUM CHLORIDE, AND CALCIUM CHLORIDE 125 ML/HR: 600; 310; 30; 20 INJECTION, SOLUTION INTRAVENOUS at 22:55

## 2017-10-25 NOTE — IP AVS SNAPSHOT
2700 [facility] [address] [place] [address] 
[phone] Patient: Glorietta Runner MRN: [id number] :1995 My Medications ASK your physician about these medications Instructions Each Dose to Equal  
 Morning Noon Evening Bedtime 701 [employer] Generic drug:  pediatric multivitamin no.49 Your last dose was: Your next dose is: Take 2 Tabs by mouth daily. Indications: VITAMIN DEFICIENCY PREVENTION  
 2 Tab  
    
   
   
   
  
 mesalamine 1.2 gram delayed release tablet Commonly known as:  Willy Ready Your last dose was: Your next dose is: Take 2 Tabs by mouth Daily (before breakfast). 2.4 g  
    
   
   
   
  
 ondansetron 4 mg disintegrating tablet Commonly known as:  ZOFRAN ODT Your last dose was: Your next dose is: Take 1 Tab by mouth every eight (8) hours as needed for Nausea. 4 mg  
    
   
   
   
  
 pantoprazole 40 mg tablet Commonly known as:  PROTONIX Your last dose was: Your next dose is: Take 1 Tab by mouth daily. 40 mg  
    
   
   
   
  
 PNV No12-Iron-FA-DSS-OM-3 29 mg iron-1 mg -50 mg Cpkd Your last dose was: Your next dose is: Take 1 Tab by mouth daily. Indications: Pregnancy 1 Tab

## 2017-10-25 NOTE — IP AVS SNAPSHOT
[address] 
[phone] Patient: Kathy Morrow MRN: [id number] :1995 About your hospitalization You were admitted on:  N/A You last received care in the:  [facility] 3 LABOR & DELIVERY You were discharged on:  2017 Why you were hospitalized Your primary diagnosis was:  Not on File Discharge Orders None A check milagros indicates which time of day the medication should be taken. My Medications ASK your physician about these medications Instructions Each Dose to Equal  
 Morning Noon Evening Bedtime [address] Generic drug:  pediatric multivitamin no.49 Your last dose was: Your next dose is: Take 2 Tabs by mouth daily. Indications: VITAMIN DEFICIENCY PREVENTION  
 2 Tab  
    
   
   
   
  
 mesalamine 1.2 gram delayed release tablet Commonly known as:  Jodelsneha Jin Your last dose was: Your next dose is: Take 2 Tabs by mouth Daily (before breakfast). 2.4 g  
    
   
   
   
  
 ondansetron 4 mg disintegrating tablet Commonly known as:  ZOFRAN ODT Your last dose was: Your next dose is: Take 1 Tab by mouth every eight (8) hours as needed for Nausea. 4 mg  
    
   
   
   
  
 pantoprazole 40 mg tablet Commonly known as:  PROTONIX Your last dose was: Your next dose is: Take 1 Tab by mouth daily. 40 mg  
    
   
   
   
  
 PNV No12-Iron-FA-DSS-OM-3 29 mg iron-1 mg -50 mg Cpkd Your last dose was: Your next dose is: Take 1 Tab by mouth daily. Indications: Pregnancy 1 Tab Discharge Instructions  DISCHARGE INSTRUCTIONS Name: Kathy Morrow YOB: 1995 Primary Diagnosis: Active Problems: * No active hospital problems.  * 
 
 
 Introduction: You have visited the hospital because you thought you were in  labor. These guidelines are for your information at home to help prevent repeated problems. In general, you should remember: 
? Empty your bladder every 2-3 hours. ? Avoid breast stimulation (including showers where the water stream is on your breasts)-this can cause contractions. ? Rest means lying down. ? Contractions and cramping happen more often in evening and nighttime. ? No intercourse or sexual stimulation without asking your doctor. ? Try to arrange for help with housework and . General:  
 
Keep appointment with perinatology 10/27 Diet/Diet Restrictions:   
 
Drink 8-10 glasses of water each day. Avoid beverages with caffeine. and Eat fresh vegetables, fruits, bran cereal to avoid becoming constipated. Physical Activity / Restrictions / Safety:  
 
* Activity at home is based on how strong your  labor has been, You should follow the following activity guidelines. Activity based on symptoms: Lie down on your side if you feel contractions or tightening in your abdomen. Discharge Instructions/ Special Treatment/ Home Care Needs:  
 
Call your provider if: 
? Uterine cramping (menstrual-like cramps, intermittent or constant ? Uterine contractions every 10-15 minutes or more frequently ? Low abdominal pressure ( pelvic pressure) ? Dull low backache (intermittent or constant) ? Increase or change in vaginal discharge ? Feeling that the baby is \"pushing down\" ? Abdominal cramping with or without diarrhea If any of these symptoms are experienced, stop what you are doing, lie down on your side, drink two to three glasses of water and wait one hour. If the symptoms persist or get worse, call your provider.  
 
Pain Management:  
 
 
 
 
Signed By: Cristiano Shannon RN                                                                                                   Date: 10/26/2017 Time: 12:08 AM 
 
Discharge Checklist-NURSING TO COMPLETE:  
 
Date and Time of Discharge: Date: 10/26/2017 Time: 12:08 AM 
 
Return of:  
Dental Appliance: Dental Appliances: None Vision: Visual Aid: None Hearing Aid:   
Jewelry: Jewelry: Samantha Ink Clothing: Clothing: At bedside Other Valuables: Other Valuables: Cell Phone Valuables sent to safe: Personal Items Sent to Safe: none Prescription Given: no 
Medication Instruction Sheet(s), including side effects, provided: no 
 
Accompanied By: Family Mode of Transportation: 
 
Discharge Disposition: Home I have had the opportunity to make my options or choices for discharge. I have received and understand these instructions. Introducing [facility]! Juan Bhat introduces [employer] patient portal. Now you can access parts of your medical record, email your doctor's office, and request medication refills online. 1. In your internet browser, go to [email or web address] 2. Click on the First Time User? Click Here link in the Sign In box. You will see the New Member Sign Up page. 3. Enter your [employer] Access Code exactly as it appears below. You will not need to use this code after youve completed the sign-up process. If you do not sign up before the expiration date, you must request a new code. · [employer] Access Code: [email or web address] Expires: 12/24/2017  7:44 AM 
 
4. Enter the last four digits of your Social Security Number (xxxx) and Date of Birth (mm/dd/yyyy) as indicated and click Submit. You will be taken to the next sign-up page. 5. Create a [employer] ID. This will be your [employer] login ID and cannot be changed, so think of one that is secure and easy to remember. 6. Create a [employer] password. You can change your password at any time. 7. Enter your Password Reset Question and Answer. This can be used at a later time if you forget your password. 8. Enter your e-mail address. You will receive e-mail notification when new information is available in [address] 9. Click Sign Up. You can now view and download portions of your medical record. 10. Click the Download Summary menu link to download a portable copy of your medical information. If you have questions, please visit the Frequently Asked Questions section of the [employer] website. Remember, [employer] is NOT to be used for urgent needs. For medical emergencies, dial 911. Now available from your iPhone and Android! Unresulted Labs-Please follow up with your PCP about these lab tests Order Current Status SAMPLE TO BLOOD BANK In process Providers Seen During Your Hospitalization Provider Specialty Primary office phone Vero Castillo DO Obstetrics & Gynecology [phone] Your Primary Care Physician (PCP) Primary Care Physician Office Phone Office Fax NONE ** None ** ** None ** You are allergic to the following Allergen Reactions Codeine Anaphylaxis Recent Documentation Height Weight Breastfeeding? BMI OB Status Smoking Status 1.524 m 67 kg No 28.83 kg/m2 Pregnant Former Smoker Emergency Contacts Name Discharge Info Relation Home Work Mobile LenBryson DISCHARGE CAREGIVER [3] Boyfriend [17] [phone] Patient Belongings The following personal items are in your possession at time of discharge: 
  Dental Appliances: None  Visual Aid: None      Home Medications: None   Jewelry: Necklace  Clothing: At bedside    Other Valuables: Cell Phone  Personal Items Sent to Safe: none Please provide this summary of care documentation to your next provider. Signatures-by signing, you are acknowledging that this After Visit Summary has been reviewed with you and you have received a copy. Patient Signature:  ____________________________________________________________ Date:  ____________________________________________________________  
  
Samantha Cleaning Provider Signature:  ____________________________________________________________ Date:  ____________________________________________________________

## 2017-10-25 NOTE — IP AVS SNAPSHOT
Summary of Care Report The Summary of Care report has been created to help improve care coordination. Users with access to [employer] or [address] (Web-based application) may access additional patient information including the Discharge Summary. If you are not currently a [address] user and need more information, please call the number listed below in the [place] [address] section and ask to be connected with Medical Records. Facility Information Name Address Phone [address] [phone] Patient Information Patient Name Sex ERASMO Del Cid ([id number]) Female 1995 Discharge Information Admitting Provider Service Area Unit Lianna Hernandez MD / [address] [address] Labor & Delivery / [phone] Discharge Provider Discharge Date/Time Discharge Disposition Destination (none) (none) (none) (none) Patient Language Language ENGLISH [13] Hospital Problems as of 10/26/2017  Reviewed: 2017 11:54 AM by Cornelia Elena MD  
 None Non-Hospital Problems as of 10/26/2017  Reviewed: 2017 11:54 AM by Cornelia Elena MD  
  
  
  
 Class Noted - Resolved Last Modified Active Problems   Ulcerative pancolitis with rectal bleeding ([facility] [address]  2017 - Present 2017 by Cornelia Elena MD  
  Entered by Cornelia Elena MD  
  Hyperemesis arising during pregnancy  2017 - Present 2017 by Cornelia Elena MD  
  Entered by Cornelia Elena MD  
  Previous  section  2017 - Present 2017 by Cornelia Elena MD  
  Entered by Cornelia Elena MD  
  Pregnancy  2017 - Present 2017 by Stefano Nguyễn MD  
  Entered by Stefano Nguyễn MD  
   contractions  2017 - Present 2017 by Wicho Suarez MD  
  Entered by Wicho Suarez MD  
 You are allergic to the following Allergen Reactions Codeine Anaphylaxis Current Discharge Medication List  
  
ASK your doctor about these medications Dose & Instructions Dispensing Information Comments [address] Generic drug:  pediatric multivitamin no. 52 Dose:  2 Tab Take 2 Tabs by mouth daily. Indications: VITAMIN DEFICIENCY PREVENTION Refills:  0  
   
 mesalamine 1.2 gram delayed release tablet Commonly known as:  Valdez Kalia Dose:  2.4 g Take 2 Tabs by mouth Daily (before breakfast). Quantity:  40 Tab Refills:  3  
   
 ondansetron 4 mg disintegrating tablet Commonly known as:  ZOFRAN ODT Dose:  4 mg Take 1 Tab by mouth every eight (8) hours as needed for Nausea. Quantity:  10 Tab Refills:  0  
   
 pantoprazole 40 mg tablet Commonly known as:  PROTONIX Dose:  40 mg Take 1 Tab by mouth daily. Quantity:  40 Tab Refills:  2 PNV No12-Iron-FA-DSS-OM-3 29 mg iron-1 mg -50 mg Cpkd Dose:  1 Tab Take 1 Tab by mouth daily. Indications: Pregnancy Refills:  0 Current Immunizations Name Date Rho(D) Immune Globulin - IM 2017 Tdap 10/20/2017 Follow-up Information None Discharge Instructions  DISCHARGE INSTRUCTIONS Name: Danette Lucas YOB: 1995 Primary Diagnosis: Active Problems: * No active hospital problems. * Introduction: You have visited the hospital because you thought you were in  labor. These guidelines are for your information at home to help prevent repeated problems. In general, you should remember: 
? Empty your bladder every 2-3 hours. ? Avoid breast stimulation (including showers where the water stream is on your breasts)-this can cause contractions. ? Rest means lying down. ? Contractions and cramping happen more often in evening and nighttime. ? No intercourse or sexual stimulation without asking your doctor. ? Try to arrange for help with housework and . General:  
 
Keep appointment with perinatology 10/27 Diet/Diet Restrictions:   
 
Drink 8-10 glasses of water each day. Avoid beverages with caffeine. and Eat fresh vegetables, fruits, bran cereal to avoid becoming constipated. Physical Activity / Restrictions / Safety:  
 
* Activity at home is based on how strong your  labor has been, You should follow the following activity guidelines. Activity based on symptoms: Lie down on your side if you feel contractions or tightening in your abdomen. Discharge Instructions/ Special Treatment/ Home Care Needs:  
 
Call your provider if: 
? Uterine cramping (menstrual-like cramps, intermittent or constant ? Uterine contractions every 10-15 minutes or more frequently ? Low abdominal pressure ( pelvic pressure) ? Dull low backache (intermittent or constant) ? Increase or change in vaginal discharge ? Feeling that the baby is \"pushing down\" ? Abdominal cramping with or without diarrhea If any of these symptoms are experienced, stop what you are doing, lie down on your side, drink two to three glasses of water and wait one hour. If the symptoms persist or get worse, call your provider. Pain Management:  
 
 
 
 
Signed By: Alecia Cheadle, RN                                                                                                   Date: 10/26/2017 Time: 12:08 AM 
 
Discharge Checklist-NURSING TO COMPLETE:  
 
Date and Time of Discharge: Date: 10/26/2017 Time: 12:08 AM 
 
Return of:  
Dental Appliance: Dental Appliances: None Vision: Visual Aid: None Hearing Aid:   
Jewelry: Jewelry: Carole Engel Clothing: Clothing: At bedside Other Valuables: Other Valuables: Cell Phone Valuables sent to safe: Personal Items Sent to Safe: none Prescription Given: no 
 Medication Instruction Sheet(s), including side effects, provided: no 
 
Accompanied By: Family Mode of Transportation: 
 
Discharge Disposition: Home I have had the opportunity to make my options or choices for discharge. I have received and understand these instructions. Chart Review Routing History No Routing History on File

## 2017-10-26 LAB — FETAL BLOOD VOL PATIENT KLEIH BETKE: NORMAL ML

## 2017-10-26 NOTE — PROGRESS NOTES
Patient seen and reevaluated. She has been sleeping and denies any pain now. She has not noted any vaginal bleeding. She continues to feel active fetal movement when awake. Visit Vitals    /63    Pulse 76    Temp 98.2 °F (36.8 °C)    Resp 16    Ht 5' (1.524 m)    Wt 67 kg (147 lb 9.6 oz)    Breastfeeding No    BMI 28.83 kg/m2     FHR baseline 135, category 1  [place] occasional contractions    FFN negative  Fetal hemoglobin screen negative for evidence of fetomaternal hemorrhage    Reviewed results and limitations of testing with patient. She is aware that there is no test result that can guarantee that abruption will not occur. However, current results are reassuring and she reports that she feels better. Offered the option of continued observation with [facility] ultrasound in AM; she prefers to be discharged home and to follow up with her regular perinatologist for an outpatient ultrasound in the morning. Fetal movement, pain, bleeding, and PTL precautions reviewed. She agrees to keep her appointment in the morning or return earlier if any concerning symptoms arise.     Sergio Michele MD  12:16 AM

## 2017-10-26 NOTE — PROGRESS NOTES
Received to [facility] with c/o \"a few contractions an hour since 1100\". Pt states good fetal movement, denies vaginal bleeding and ROM. Urine specimen obtained on admission     EFM applied. Abdomen soft, non tender. Good audible and palpable fetal movement     Dr Nithin Briggs at bedside     FFN obtained by Dr Nithin Briggs     VE- cervix closed and thick     IV started left arm- bolus began. CBC, sample to BB and KB obtained. FFN and blood work and urine sent to lab.  Transferred ambulatory to [facility] 9. EFM reapplied    0005 Dr Nithin Briggs at bedside. Pt given option to remain monitored in L&D through night or be discharged at this time. Pt opting to go home and keep appointment with perinatology in AM    0015 Monitor off- IV d/c'd    0020 Discharged home accompanied by .  instructions given and signed.

## 2017-10-26 NOTE — DISCHARGE INSTRUCTIONS
DISCHARGE INSTRUCTIONS    Name: Sushma Villa  YOB: 1995  Primary Diagnosis: Active Problems:    * No active hospital problems. *      Introduction: You have visited the hospital because you thought you were in  labor. These guidelines are for your information at home to help prevent repeated problems. In general, you should remember:   Empty your bladder every 2-3 hours.  Avoid breast stimulation (including showers where the water stream is on your breasts)-this can cause contractions.  Rest means lying down.  Contractions and cramping happen more often in evening and nighttime.  No intercourse or sexual stimulation without asking your doctor.  Try to arrange for help with housework and . General:     Keep appointment with perinatology 10/27    Diet/Diet Restrictions:      Drink 8-10 glasses of water each day. Avoid beverages with caffeine. and Eat fresh vegetables, fruits, bran cereal to avoid becoming constipated. Physical Activity / Restrictions / Safety:     * Activity at home is based on how strong your  labor has been, You should follow the following activity guidelines. Activity based on symptoms: Lie down on your side if you feel contractions or tightening in your abdomen. Discharge Instructions/ Special Treatment/ Home Care Needs:     Call your provider if:   Uterine cramping (menstrual-like cramps, intermittent or constant   Uterine contractions every 10-15 minutes or more frequently   Low abdominal pressure ( pelvic pressure)   Dull low backache (intermittent or constant)   Increase or change in vaginal discharge   Feeling that the baby is \"pushing down\"   Abdominal cramping with or without diarrhea  If any of these symptoms are experienced, stop what you are doing, lie down on your side, drink two to three glasses of water and wait one hour. If the symptoms persist or get worse, call your provider.     Pain Management: Signed By: Sarahi Jacome RN                                                                                                   Date: 10/26/2017 Time: 12:08 AM    Discharge Checklist-NURSING TO COMPLETE:     Date and Time of Discharge: Date: 10/26/2017 Time: 12:08 AM    Return of:   Dental Appliance: Dental Appliances: None  Vision: Visual Aid: None  Hearing Aid:    Jewelry: Jewelry: Necklace  Clothing: Clothing: At bedside  Other Valuables: Other Valuables: Cell Phone  Valuables sent to safe: Personal Items Sent to Safe: none    Prescription Given: no  Medication Instruction Sheet(s), including side effects, provided: no    Accompanied By: Family    Mode of Transportation:    Discharge Disposition: Home    I have had the opportunity to make my options or choices for discharge. I have received and understand these instructions.

## 2017-10-26 NOTE — H&P
Labor and Delivery Admission Note  10/25/2017    Patient is a 25 y.o. Aracely Jones at [address] [place] [address] who presents reporting vague intermittent abdominal pain throughout the day today. She also notes occasional tightening of her abdomen. She denies bleeding or leaking fluid. She reports active fetal movement. She denies specific aggravating or alleviating factors and reports the pain location to be variable throughout her abdomen at different times. She is very concerned that her abdominal pain may be a sign of placental abruption based on her previous experience. She has a significant obstetrical history with history of placental abruption, emergent , and  loss at 32 weeks with her first pregnancy. She reports that she has been having regular ultrasounds with a perinatologist during the current pregnancy with the next one due tomorrow. Pregnancy is also complicated by Crohns disease versus ulcerative colitis with severe anemia for which she has been receiving weekly iron infusions. She was admitted to [facility] at 17 weeks of pregnancy with a colitis flare. PNC: Blood type: A            RH: neg            Hep B: neg            GBS status: unknown    OBHX:  As per HPI  OB History      Para Term  AB Living    2 1  1  0    SAB TAB Ectopic Molar Multiple Live Births         1          PMH:   Past Medical History:   Diagnosis Date    Anemia     receiving iron transfusions- last one 10/20/2017    Cro[facility]'s colitis ([facility] [address]     Herpes simplex virus (HSV) infection     just oral cold sores    Infertility, female     due to PCOS and chron's    PCOS (polycystic ovarian syndrome)        PSH:   Past Surgical History:   Procedure Laterality Date     DELIVERY ONLY  2016     due to placental abruption; IUFD       OB/GYN: Orin Bonilla    Meds:   Prior to Admission Medications   Prescriptions Last Dose Informant Patient Reported? Taking?    PNV No12-Iron-FA-DSS-OM-3 29 mg iron-1 mg -50 mg CPKD Not Taking at Unknown time  Yes No   Sig: Take 1 Tab by mouth daily. Indications: Pregnancy   mesalamine (LIALDA) 1.2 gram delayed release tablet 10/25/2017 at 1200  No Yes   Sig: Take 2 Tabs by mouth Daily (before breakfast). ondansetron (ZOFRAN ODT) 4 mg disintegrating tablet Not Taking at Unknown time  No No   Sig: Take 1 Tab by mouth every eight (8) hours as needed for Nausea. pantoprazole (PROTONIX) 40 mg tablet 10/25/2017 at 0900  No Yes   Sig: Take 1 Tab by mouth daily. pediatric multivitamin no.49 (FLINTSTONES GUMMIES) chew 10/25/2017 at 1400  Yes Yes   Sig: Take 2 Tabs by mouth daily. Indications: VITAMIN DEFICIENCY PREVENTION      Facility-Administered Medications: None       Allergies:    Allergies   Allergen Reactions    Codeine Anaphylaxis       Pertinent ROS: Review of Systems - General ROS: negative  Respiratory ROS: negative  Cardiovascular ROS: negative  Gastrointestinal ROS:  positive for abdominal pain  Genito-Urinary ROS: negative  Neurological ROS: negative    FMH:   Family History   Problem Relation Age of Onset    Cancer Mother 32     Cervical   Hershell Anaid Migraines Mother     Psychiatric Disorder Mother     Cancer Maternal Grandmother      Cervical and ovarian    Hypertension Maternal Grandmother     Psychiatric Disorder Maternal Grandmother     Diabetes Maternal Grandfather     Hypertension Maternal Grandfather     Heart Disease Maternal Grandfather     Cancer Maternal Grandfather      Thyroid        SH:   Social History     Social History    Marital status: SINGLE     Spouse name: N/A    Number of children: N/A    Years of education: N/A       Social History Main Topics    Smoking status: Former Smoker     Quit date: 5/1/2017    Smokeless tobacco: Never Used    Alcohol use No    Drug use: No    Sexual activity: Yes     Partners: Male     Birth control/ protection: None           OBJECTIVE:    Visit Vitals    /71    Pulse 82    Temp 98.6 °F (37 °C)    Resp 16    Ht 5' (1.524 m)    Wt 67 kg (147 lb 9.6 oz)    Breastfeeding No    BMI 28.83 kg/m2       FHR baseline 140, category 1  [place] occasional contractions seen    Exam:  General: alert  HEENT:  normal   Abdomen:  Soft, non-tender  Cervix:  Long and closed  Fluid:  None seen  Extremities:  normal, no edema    FFN collected prior to cervical exam    Recent Results (from the past 8 hour(s))   CBC W/O DIFF    Collection Time: 10/25/17  9:29 PM   Result Value Ref Range    WBC 7.8 3.6 - 11.0 K/uL    RBC 3.71 (L) 3.80 - 5.20 M/uL    HGB 9.5 (L) 11.5 - 16.0 g/dL    HCT 31.3 (L) 35.0 - 47.0 %    MCV 84.4 80.0 - 99.0 FL    MCH 25.6 (L) 26.0 - 34.0 PG    MCHC 30.4 30.0 - 36.5 g/dL    RDW 23.8 (H) 11.5 - 14.5 %    PLATELET 122 609 - 999 K/uL   URINALYSIS W/ REFLEX CULTURE    Collection Time: 10/25/17  9:30 PM   Result Value Ref Range    Color YELLOW/STRAW      Appearance CLOUDY (A) CLEAR      Specific gravity 1.021 1.003 - 1.030      pH (UA) 7.0 5.0 - 8.0      Protein NEGATIVE  NEG mg/dL    Glucose NEGATIVE  NEG mg/dL    Ketone NEGATIVE  NEG mg/dL    Bilirubin NEGATIVE  NEG      Blood NEGATIVE  NEG      Urobilinogen 1.0 0.2 - 1.0 EU/dL    Nitrites NEGATIVE  NEG      Leukocyte Esterase NEGATIVE  NEG      WBC 0-4 0 - 4 /hpf    RBC 0-5 0 - 5 /hpf    Epithelial cells MODERATE (A) FEW /lpf    Bacteria NEGATIVE  NEG /hpf    UA:UC IF INDICATED CULTURE NOT INDICATED BY UA RESULT CNI      Hyaline cast 2-5 0 - 5 /lpf   FETAL FIBRONECTIN    Collection Time: 10/25/17  9:49 PM   Result Value Ref Range    Fetal fibronectin NEGATIVE  NEG         Impression:  at 32w1d with nonspecific abdominal pain; pregnancy complicated by anemia, Crohns disease, and history of placental abruption with  loss in previous pregnancy. Abdominal exam is benign at this time. Urinalysis not suggestive of UTI.   Given patient's history of previous abruption (which she reports was likewise associated with vague symptoms), fetal hemoglobin screen has been ordered and is pending. Will continue with external fetal monitoring while awaiting result. If pain persists, will have low threshold to keep on observation with continuous monitoring until able to have perinatology ultrasound and consult in the morning.     Melinda Garcia MD  9:46 PM

## 2017-12-30 ENCOUNTER — HOSPITAL ENCOUNTER (EMERGENCY)
Age: 22
Discharge: HOME | End: 2017-12-30
Attending: EMERGENCY MEDICINE
Payer: MEDICAID

## 2017-12-30 VITALS
RESPIRATION RATE: 18 BRPM | WEIGHT: 135 LBS | DIASTOLIC BLOOD PRESSURE: 64 MMHG | TEMPERATURE: 97.9 F | HEART RATE: 67 BPM | OXYGEN SATURATION: 100 % | HEIGHT: 60 IN | BODY MASS INDEX: 26.5 KG/M2 | SYSTOLIC BLOOD PRESSURE: 110 MMHG

## 2017-12-30 DIAGNOSIS — K50.911 CROHN'S DISEASE WITH RECTAL BLEEDING, UNSPECIFIED GASTROINTESTINAL TRACT LOCATION (HCC): Primary | ICD-10-CM

## 2017-12-30 LAB
ALBUMIN SERPL-MCNC: 3.6 G/DL (ref 3.5–5)
ALBUMIN/GLOB SERPL: 1 {RATIO} (ref 1.1–2.2)
ALP SERPL-CCNC: 134 U/L (ref 45–117)
ALT SERPL-CCNC: 23 U/L (ref 12–78)
ANION GAP SERPL CALC-SCNC: 6 MMOL/L (ref 5–15)
APPEARANCE UR: ABNORMAL
AST SERPL-CCNC: 11 U/L (ref 15–37)
BACTERIA URNS QL MICRO: NEGATIVE /HPF
BASOPHILS # BLD: 0 K/UL (ref 0–0.1)
BASOPHILS NFR BLD: 0 % (ref 0–1)
BILIRUB SERPL-MCNC: 0.8 MG/DL (ref 0.2–1)
BILIRUB UR QL: NEGATIVE
BUN SERPL-MCNC: 8 MG/DL (ref 6–20)
BUN/CREAT SERPL: 12 (ref 12–20)
CALCIUM SERPL-MCNC: 8.3 MG/DL (ref 8.5–10.1)
CHLORIDE SERPL-SCNC: 109 MMOL/L (ref 97–108)
CO2 SERPL-SCNC: 27 MMOL/L (ref 21–32)
COLOR UR: ABNORMAL
CREAT SERPL-MCNC: 0.67 MG/DL (ref 0.55–1.02)
EOSINOPHIL # BLD: 0.2 K/UL (ref 0–0.4)
EOSINOPHIL NFR BLD: 3 % (ref 0–7)
EPITH CASTS URNS QL MICRO: ABNORMAL /LPF
ERYTHROCYTE [DISTWIDTH] IN BLOOD BY AUTOMATED COUNT: 17 % (ref 11.5–14.5)
GLOBULIN SER CALC-MCNC: 3.5 G/DL (ref 2–4)
GLUCOSE SERPL-MCNC: 59 MG/DL (ref 65–100)
GLUCOSE UR STRIP.AUTO-MCNC: NEGATIVE MG/DL
HCT VFR BLD AUTO: 43.7 % (ref 35–47)
HGB BLD-MCNC: 14.4 G/DL (ref 11.5–16)
HGB UR QL STRIP: NEGATIVE
HYALINE CASTS URNS QL MICRO: ABNORMAL /LPF (ref 0–5)
KETONES UR QL STRIP.AUTO: NEGATIVE MG/DL
LEUKOCYTE ESTERASE UR QL STRIP.AUTO: ABNORMAL
LIPASE SERPL-CCNC: 255 U/L (ref 73–393)
LYMPHOCYTES # BLD: 2.3 K/UL (ref 0.8–3.5)
LYMPHOCYTES NFR BLD: 28 % (ref 12–49)
MCH RBC QN AUTO: 29.8 PG (ref 26–34)
MCHC RBC AUTO-ENTMCNC: 33 G/DL (ref 30–36.5)
MCV RBC AUTO: 90.3 FL (ref 80–99)
MONOCYTES # BLD: 0.6 K/UL (ref 0–1)
MONOCYTES NFR BLD: 8 % (ref 5–13)
NEUTS SEG # BLD: 4.9 K/UL (ref 1.8–8)
NEUTS SEG NFR BLD: 61 % (ref 32–75)
NITRITE UR QL STRIP.AUTO: NEGATIVE
PH UR STRIP: 5.5 [PH] (ref 5–8)
PLATELET # BLD AUTO: 216 K/UL (ref 150–400)
POTASSIUM SERPL-SCNC: 3.5 MMOL/L (ref 3.5–5.1)
PROT SERPL-MCNC: 7.1 G/DL (ref 6.4–8.2)
PROT UR STRIP-MCNC: NEGATIVE MG/DL
RBC # BLD AUTO: 4.84 M/UL (ref 3.8–5.2)
RBC #/AREA URNS HPF: ABNORMAL /HPF (ref 0–5)
SODIUM SERPL-SCNC: 142 MMOL/L (ref 136–145)
SP GR UR REFRACTOMETRY: 1.02 (ref 1–1.03)
UR CULT HOLD, URHOLD: NORMAL
UROBILINOGEN UR QL STRIP.AUTO: 0.2 EU/DL (ref 0.2–1)
WBC # BLD AUTO: 8 K/UL (ref 3.6–11)
WBC URNS QL MICRO: ABNORMAL /HPF (ref 0–4)

## 2017-12-30 PROCEDURE — 36415 COLL VENOUS BLD VENIPUNCTURE: CPT | Performed by: EMERGENCY MEDICINE

## 2017-12-30 PROCEDURE — 86921 COMPATIBILITY TEST INCUBATE: CPT | Performed by: EMERGENCY MEDICINE

## 2017-12-30 PROCEDURE — 83690 ASSAY OF LIPASE: CPT | Performed by: EMERGENCY MEDICINE

## 2017-12-30 PROCEDURE — 81001 URINALYSIS AUTO W/SCOPE: CPT | Performed by: EMERGENCY MEDICINE

## 2017-12-30 PROCEDURE — 74011250637 HC RX REV CODE- 250/637: Performed by: EMERGENCY MEDICINE

## 2017-12-30 PROCEDURE — 86922 COMPATIBILITY TEST ANTIGLOB: CPT | Performed by: EMERGENCY MEDICINE

## 2017-12-30 PROCEDURE — 99284 EMERGENCY DEPT VISIT MOD MDM: CPT

## 2017-12-30 PROCEDURE — 85025 COMPLETE CBC W/AUTO DIFF WBC: CPT | Performed by: EMERGENCY MEDICINE

## 2017-12-30 PROCEDURE — 74011636637 HC RX REV CODE- 636/637: Performed by: EMERGENCY MEDICINE

## 2017-12-30 PROCEDURE — 86920 COMPATIBILITY TEST SPIN: CPT | Performed by: EMERGENCY MEDICINE

## 2017-12-30 PROCEDURE — 86901 BLOOD TYPING SEROLOGIC RH(D): CPT | Performed by: EMERGENCY MEDICINE

## 2017-12-30 PROCEDURE — 74011250636 HC RX REV CODE- 250/636: Performed by: EMERGENCY MEDICINE

## 2017-12-30 PROCEDURE — 96361 HYDRATE IV INFUSION ADD-ON: CPT

## 2017-12-30 PROCEDURE — 96360 HYDRATION IV INFUSION INIT: CPT

## 2017-12-30 PROCEDURE — 80053 COMPREHEN METABOLIC PANEL: CPT | Performed by: EMERGENCY MEDICINE

## 2017-12-30 RX ORDER — PREDNISONE 20 MG/1
40 TABLET ORAL
Status: COMPLETED | OUTPATIENT
Start: 2017-12-30 | End: 2017-12-30

## 2017-12-30 RX ORDER — OXYCODONE AND ACETAMINOPHEN 5; 325 MG/1; MG/1
1 TABLET ORAL
Qty: 20 TAB | Refills: 0 | Status: SHIPPED | OUTPATIENT
Start: 2017-12-30 | End: 2020-09-06 | Stop reason: ALTCHOICE

## 2017-12-30 RX ORDER — PREDNISONE 5 MG/1
TABLET ORAL
Qty: 20 TAB | Refills: 0 | Status: SHIPPED | OUTPATIENT
Start: 2017-12-30 | End: 2020-09-06 | Stop reason: ALTCHOICE

## 2017-12-30 RX ORDER — OXYCODONE AND ACETAMINOPHEN 5; 325 MG/1; MG/1
1 TABLET ORAL
Status: COMPLETED | OUTPATIENT
Start: 2017-12-30 | End: 2017-12-30

## 2017-12-30 RX ADMIN — OXYCODONE HYDROCHLORIDE AND ACETAMINOPHEN 1 TABLET: 5; 325 TABLET ORAL at 14:42

## 2017-12-30 RX ADMIN — SODIUM CHLORIDE 1000 ML: 900 INJECTION, SOLUTION INTRAVENOUS at 12:33

## 2017-12-30 RX ADMIN — PREDNISONE 40 MG: 20 TABLET ORAL at 14:43

## 2017-12-30 NOTE — ED PROVIDER NOTES
HPI Comments: 25 y.o. female with past medical history significant for Crohn's colitis, PCOS, and anemia who presents from home with chief complaint of bloody stool. Pt reports she has been having a \"Crohn's flare up\" for 2 weeks involving bloody stool, 20-30 episodes of diarrhea daily, nausea, 8/10 generalized abdominal pain (L>R) and 3 episodes of vomiting per day. Pt states her sx worsened over the last 2 days. She recently had a baby and states she was on mesalamine throughout the pregnancy. Pt notes she was last scoped in . When asked about abdominal surgery, Pt reports . Pt is breastfeeding. Pt denies hx of bowel resection. Pt denies fever and syncope. There are no other acute medical concerns at this time. GI: Amaya Crawford MD    Note written by Al Reagan, as dictated by Sangeetha Jennings MD 12:31 PM    The history is provided by the patient.         Past Medical History:   Diagnosis Date    Anemia     receiving iron transfusions- last one 10/20/2017    [facility]'s colitis ([facility] [address]     H/O  section     Herpes simplex virus (HSV) infection     just oral cold sores    Infertility, female     due to PCOS and chron's    PCOS (polycystic ovarian syndrome)     Placental abruption     Polycystic disease, ovaries     Rhesus isoimmunization affecting pregnancy        Past Surgical History:   Procedure Laterality Date     DELIVERY ONLY  2016     due to placental abruption;  demise    HX  SECTION      x 1         Family History:   Problem Relation Age of Onset    Cancer Mother 32     Cervical    Migraines Mother     Psychiatric Disorder Mother     Cancer Maternal Grandmother      Cervical and ovarian    Hypertension Maternal Grandmother     Psychiatric Disorder Maternal Grandmother     Diabetes Maternal Grandfather     Hypertension Maternal Grandfather     Heart Disease Maternal Grandfather     Cancer Maternal Grandfather Thyroid        Social History     Social History    Marital status: SINGLE     Spouse name: N/A    Number of children: N/A    Years of education: N/A     Occupational History    Not on file. Social History Main Topics    Smoking status: Former Smoker     Quit date: 5/1/2017    Smokeless tobacco: Never Used    Alcohol use No    Drug use: No    Sexual activity: Yes     Partners: Male     Birth control/ protection: None     Other Topics Concern    Not on file     Social History Narrative         ALLERGIES: Codeine    Review of Systems   Constitutional: Negative for activity change, chills and fever. HENT: Negative for nosebleeds, sore throat, trouble swallowing and voice change. Eyes: Negative for visual disturbance. Respiratory: Negative for shortness of breath. Cardiovascular: Negative for chest pain and palpitations. Gastrointestinal: Positive for abdominal pain, blood in stool, diarrhea, nausea and vomiting. Negative for constipation. Genitourinary: Negative for difficulty urinating, dysuria, hematuria and urgency. Musculoskeletal: Negative for back pain, neck pain and neck stiffness. Skin: Negative for color change. Allergic/Immunologic: Negative for immunocompromised state. Neurological: Negative for dizziness, seizures, syncope, weakness, light-headedness, numbness and headaches. Psychiatric/Behavioral: Negative for behavioral problems, confusion, hallucinations, self-injury and suicidal ideas. All other systems reviewed and are negative. Vitals:    12/30/17 1216   BP: 125/70   Pulse: 76   Resp: 16   Temp: 97.7 °F (36.5 °C)   SpO2: 100%   Weight: 61.2 kg (135 lb)   Height: 5' (1.524 m)            Physical Exam   Constitutional: She is oriented to person, place, and time. She appears well-developed and well-nourished. No distress. HENT:   Head: Normocephalic and atraumatic. Eyes: Pupils are equal, round, and reactive to light. Neck: Normal range of motion.  Neck supple. Cardiovascular: Normal rate, regular rhythm and normal heart sounds. Exam reveals no gallop and no friction rub. No murmur heard. Pulmonary/Chest: Effort normal and breath sounds normal. No respiratory distress. She has no wheezes. Abdominal: Soft. Bowel sounds are normal. She exhibits no distension. There is no tenderness. There is no rebound and no guarding. Musculoskeletal: Normal range of motion. Neurological: She is alert and oriented to person, place, and time. Skin: Skin is warm. She is not diaphoretic. Psychiatric: She has a normal mood and affect. Her behavior is normal. Judgment and thought content normal.   Nursing note and vitals reviewed. Note written by Al Simmons Asa, as dictated by Anuradha Crawford MD 12:31 PM    [facility]  ED Course   This is a 28-year-old female with past medical history, review of systems, physical exam as above, presenting with complaints of melena. She states she's been experiencing several days of abdominal pain, blood per rectum, in the setting of a history of Crohn's disease. He denies vomiting, fevers, chills, pain or shortness of breath. She endorses she is compliant with her mesalamine, baseline treatment for her Crohn's disease. Sickle exam is remarkable for well-appearing female, with a soft nontender abdomen on exam, noted to be afebrile, without tachycardia or hypotension. Would prefer to defer CT imaging, we'll obtain CMP, CBC, provide pain control, IV fluids, and consult with GI for recommendations. Procedures    CONSULT NOTE:  1:50 PM Anuradha Crawford MD spoke with Dr. Jack Wilson, Consult for Gastroenterology. Discussed available diagnostic tests and clinical findings. He is in agreement with care plans as outlined. Dr. Jack Wilson recommends discharging Pt home with a steroid taper.       2:23 PM  Patient with lab work wnl, previously benign abdominal exam.  Per discussions with GI, will DC home with steroid taper, pain control, GI follow up.  I discussed with the patient opiate use whil;e breastfeeding, I am providing the lowest reasonable dose for a short period of time and have advised the family to watch for sedation.

## 2017-12-30 NOTE — DISCHARGE INSTRUCTIONS
Crohn's Disease: Care Instructions  Your Care Instructions    Crohn's disease is a lifelong inflammatory bowel disease (IBD). Parts of the digestive tract get swollen and irritated and may develop deep sores called ulcers. Crohn's disease usually occurs in the last part of the small intestine and the first part of the large intestine. But it can develop anywhere from the mouth to the anus. The main symptoms of Crohn's disease are belly pain, diarrhea, fever, and weight loss. Some people may have constipation. Crohn's disease also sometimes causes problems with the joints, eyes, or skin. Your symptoms may be mild at some times and severe at others. The disease can also go into remission, which means that it is not active and you have no symptoms. Bad attacks of Crohn's disease often have to be treated in the hospital so that you can get medicines and liquids through a tube in your vein, called an IV. This gives your digestive system time to rest and recover. Talk with your doctor about the best treatments for you. You may need medicines that help prevent or treat flare-ups of the disease. You may need surgery to remove part of your bowel if you have an abnormal opening in the bowel (fistula), an abscess, or a bowel obstruction. In some cases, surgery is needed if medicines do not work. But symptoms often return to other areas of the intestines after surgery. Learning good self-care can help you reduce your symptoms and manage Crohn's disease. Follow-up care is a key part of your treatment and safety. Be sure to make and go to all appointments, and call your doctor if you are having problems. It's also a good idea to know your test results and keep a list of the medicines you take. How can you care for yourself at home? · Take your medicines exactly as prescribed. Call your doctor if you think you are having a problem with your medicine.  You will get more details on the specific medicines your doctor prescribes. · Do not take anti-inflammatory medicines, such as aspirin, ibuprofen (Advil, Motrin), or naproxen (Aleve). They may make your symptoms worse. Do not take any other medicines or herbal products without talking to your doctor first.  · Avoid foods that make your symptoms worse. These might include milk, alcohol, high-fiber foods, or spicy foods. · Eat a healthy diet. Make sure to get enough iron. Rectal bleeding may make you lose iron. Good sources of iron include beef, lentils, spinach, raisins, and iron-enriched breads and cereals. · Drink liquid meal replacements if your doctor recommends them. These are high in calories and contain vitamins and minerals. Severe symptoms may make it hard for your body to absorb vitamins and minerals. · Do not smoke. Smoking makes Crohn's disease worse. If you need help quitting, talk to your doctor about stop-smoking programs and medicines. These can increase your chances of quitting for good. · Seek support from friends and family to help cope with Crohn's disease. The illness can affect all parts of your life. Get counseling if you need it. When should you call for help? Call 911 anytime you think you may need emergency care. For example, call if:  ? · Your stools are maroon or very bloody. ? · You passed out (lost consciousness). ?Call your doctor now or seek immediate medical care if:  ? · You are vomiting. ? · You have new or worse belly pain. ? · You have a fever. ? · You cannot pass stools or gas. ? · You have new or more blood in your stools. ? Watch closely for changes in your health, and be sure to contact your doctor if:  ? · You have new or worse symptoms. ? · You are losing weight. ? · You do not get better as expected. Where can you learn more? Go to http://giselle-tara.info/. Enter 21 [phone] in the search box to learn more about \"Crohn's Disease: Care Instructions. \"  Current as of:  May 12, 2017  Content Version: 11.4  © 4046-8823 [employer]. Care instructions adapted under license by [employer] (which disclaims liability or warranty for this information). If you have questions about a medical condition or this instruction, always ask your healthcare professional. [address] any warranty or liability for your use of this information.

## 2017-12-30 NOTE — ED TRIAGE NOTES
Pt reports bright red rectal bleeding that began over 2 weeks ago. Pt states she has Chron's Disease and believes it is a flare up. Pt also reports nausea and vomiting. No blood noted in vomit.

## 2018-01-01 LAB
ABO + RH BLD: NORMAL
BLD PROD TYP BPU: NORMAL
BLD PROD TYP BPU: NORMAL
BLOOD BANK CMNT PATIENT-IMP: NORMAL
BLOOD GROUP ANTIBODIES SERPL: NORMAL
BPU ID: NORMAL
BPU ID: NORMAL
CROSSMATCH RESULT,%XM: NORMAL
CROSSMATCH RESULT,%XM: NORMAL
SPECIMEN EXP DATE BLD: NORMAL
STATUS OF UNIT,%ST: NORMAL
STATUS OF UNIT,%ST: NORMAL
UNIT DIVISION, %UDIV: 0
UNIT DIVISION, %UDIV: 0

## 2018-07-25 ENCOUNTER — HOSPITAL ENCOUNTER (EMERGENCY)
Age: 23
Discharge: HOME | End: 2018-07-25
Attending: STUDENT IN AN ORGANIZED HEALTH CARE EDUCATION/TRAINING PROGRAM
Payer: COMMERCIAL

## 2018-07-25 ENCOUNTER — APPOINTMENT (OUTPATIENT)
Dept: GENERAL RADIOLOGY | Age: 23
End: 2018-07-25
Attending: PHYSICIAN ASSISTANT
Payer: COMMERCIAL

## 2018-07-25 ENCOUNTER — APPOINTMENT (OUTPATIENT)
Dept: CT IMAGING | Age: 23
End: 2018-07-25
Attending: PHYSICIAN ASSISTANT
Payer: COMMERCIAL

## 2018-07-25 VITALS
BODY MASS INDEX: 26.5 KG/M2 | OXYGEN SATURATION: 98 % | DIASTOLIC BLOOD PRESSURE: 73 MMHG | RESPIRATION RATE: 18 BRPM | WEIGHT: 135 LBS | TEMPERATURE: 97.6 F | HEIGHT: 60 IN | SYSTOLIC BLOOD PRESSURE: 114 MMHG | HEART RATE: 86 BPM
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
## 2018-07-25 DIAGNOSIS — S09.90XA INJURY OF HEAD, INITIAL ENCOUNTER: ICD-10-CM

## 2018-07-25 PROCEDURE — 75810000275 HC EMERGENCY DEPT VISIT NO LEVEL OF CARE

## 2018-07-25 PROCEDURE — 99284 EMERGENCY DEPT VISIT MOD MDM: CPT

## 2018-07-25 PROCEDURE — 73130 X-RAY EXAM OF HAND: CPT

## 2018-07-25 PROCEDURE — 81025 URINE PREGNANCY TEST: CPT

## 2018-07-25 PROCEDURE — 74011250637 HC RX REV CODE- 250/637: Performed by: PHYSICIAN ASSISTANT

## 2018-07-25 PROCEDURE — 70450 CT HEAD/BRAIN W/O DYE: CPT

## 2018-07-25 RX ORDER — OXYCODONE HYDROCHLORIDE 5 MG/1
5 TABLET ORAL
Status: COMPLETED | OUTPATIENT
Start: 2018-07-25 | End: 2018-07-25

## 2018-07-25 RX ORDER — ACETAMINOPHEN 325 MG/1
650 TABLET ORAL
Status: COMPLETED | OUTPATIENT
Start: 2018-07-25 | End: 2018-07-25

## 2018-07-25 RX ORDER — IBUPROFEN 600 MG/1
600 TABLET ORAL
Status: DISCONTINUED | OUTPATIENT
Start: 2018-07-25 | End: 2018-07-25

## 2018-07-25 RX ORDER — ONDANSETRON 4 MG/1
4 TABLET, ORALLY DISINTEGRATING ORAL
Status: DISCONTINUED | OUTPATIENT
Start: 2018-07-25 | End: 2018-07-26 | Stop reason: HOSPADM

## 2018-07-25 RX ORDER — ONDANSETRON 4 MG/1
4 TABLET, ORALLY DISINTEGRATING ORAL
Qty: 12 TAB | Refills: 0 | Status: SHIPPED | OUTPATIENT
Start: 2018-07-25 | End: 2018-08-04

## 2018-07-25 RX ADMIN — ACETAMINOPHEN 650 MG: 325 TABLET ORAL at 20:44

## 2018-07-25 RX ADMIN — ONDANSETRON 4 MG: 4 TABLET, ORALLY DISINTEGRATING ORAL at 21:38

## 2018-07-25 RX ADMIN — OXYCODONE HYDROCHLORIDE 5 MG: 5 TABLET ORAL at 21:38
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
## 2018-07-25 NOTE — ED NOTES

## 2018-07-25 NOTE — LETTER
[address] 
[address] [place] [address] [facility] [id number] 
[phone] Work/School Note Date: 7/25/2018 To Whom It May concern: 
 
Una Can was seen and treated today in the emergency room by the following provider(s): 
Attending Provider: Verena Bean MD 
Physician Assistant: NAZARIO Kraft. Una Can may return to work Monday; sooner if symptoms improve. Sincerely, NAZARIO Kraft
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
## 2018-07-26 NOTE — ED NOTES
Discharge instructions given to patient by PA and nurse. Pt verbalizes understanding. Pt ambulated off of unit in no signs of distress. Pt has ride home.

## 2018-07-26 NOTE — DISCHARGE INSTRUCTIONS
Learning About a Closed Head Injury  What is a closed head injury? A closed head injury happens when your head gets hit hard. The strong force of the blow causes your brain to shake in your skull. This movement can cause the brain to bruise, swell, or tear. Sometimes nerves or blood vessels also get damaged. This can cause bleeding in or around the brain. A concussion is a type of closed head injury. What are the symptoms? If you have a mild concussion, you may have a mild headache or feel \"not quite right. \" These symptoms are common. They usually go away over a few days to 4 weeks. But sometimes after a concussion, you feel like you can't function as well as before the injury. And you have new symptoms. This is called postconcussive syndrome. You may:  · Find it harder to solve problems, think, concentrate, or remember. · Have headaches. · Have changes in your sleep patterns, such as not being able to sleep or sleeping all the time. · Have changes in your personality. · Not be interested in your usual activities. · Feel angry or anxious without a clear reason. · Lose your sense of taste or smell. · Be dizzy, lightheaded, or unsteady. It may be hard to stand or walk. How is a closed head injury treated? Any person who may have a concussion needs to see a doctor. Some people have to stay in the hospital to be watched. Others can go home safely. If you go home, follow your doctor's instructions. He or she will tell you if you need someone to watch you closely for the next 24 hours or longer. Rest is the best treatment. Get plenty of sleep at night. And try to rest during the day. · Avoid activities that are physically or mentally demanding. These include housework, exercise, and schoolwork. And don't play video games, send text messages, or use the computer. You may need to change your school or work schedule to be able to avoid these activities.   · Ask your doctor when it's okay to drive, ride a bike, or operate machinery. · Take an over-the-counter pain medicine, such as acetaminophen (Tylenol), ibuprofen (Advil, Motrin), or naproxen (Aleve). Be safe with medicines. Read and follow all instructions on the label. · Check with your doctor before you use any other medicines for pain. · Do not drink alcohol or use illegal drugs. They can slow recovery. They can also increase your risk of getting a second head injury. Follow-up care is a key part of your treatment and safety. Be sure to make and go to all appointments, and call your doctor if you are having problems. It's also a good idea to know your test results and keep a list of the medicines you take. Where can you learn more? Go to http://giselle-tara.info/. Enter E235 in the search box to learn more about \"Learning About a Closed Head Injury. \"  Current as of: October 9, 2017  Content Version: 11.7  © 1502-8460 [employer]. Care instructions adapted under license by [employer] (which disclaims liability or warranty for this information). If you have questions about a medical condition or this instruction, always ask your healthcare professional. [address] any warranty or liability for your use of this information.

## 2018-07-27 LAB — HCG UR QL: NEGATIVE

## 2019-02-06 ENCOUNTER — APPOINTMENT (OUTPATIENT)
Dept: GENERAL RADIOLOGY | Age: 24
End: 2019-02-06
Attending: EMERGENCY MEDICINE
Payer: MEDICAID

## 2019-02-06 ENCOUNTER — HOSPITAL ENCOUNTER (EMERGENCY)
Age: 24
Discharge: HOME | End: 2019-02-06
Attending: EMERGENCY MEDICINE
Payer: MEDICAID

## 2019-02-06 VITALS
RESPIRATION RATE: 18 BRPM | DIASTOLIC BLOOD PRESSURE: 83 MMHG | SYSTOLIC BLOOD PRESSURE: 127 MMHG | HEART RATE: 60 BPM | TEMPERATURE: 98.2 F | OXYGEN SATURATION: 100 %

## 2019-02-06 DIAGNOSIS — M79.672 LEFT FOOT PAIN: Primary | ICD-10-CM

## 2019-02-06 LAB — HCG UR QL: NEGATIVE

## 2019-02-06 PROCEDURE — 73630 X-RAY EXAM OF FOOT: CPT

## 2019-02-06 PROCEDURE — 74011250637 HC RX REV CODE- 250/637: Performed by: EMERGENCY MEDICINE

## 2019-02-06 PROCEDURE — 99283 EMERGENCY DEPT VISIT LOW MDM: CPT

## 2019-02-06 PROCEDURE — 77030036687 HC SHOE PSTOP S2SG -A

## 2019-02-06 PROCEDURE — 81025 URINE PREGNANCY TEST: CPT

## 2019-02-06 RX ORDER — TRAMADOL HYDROCHLORIDE 50 MG/1
50 TABLET ORAL
Qty: 12 TAB | Refills: 0 | Status: SHIPPED | OUTPATIENT
Start: 2019-02-06 | End: 2020-09-06 | Stop reason: ALTCHOICE

## 2019-02-06 RX ORDER — ACETAMINOPHEN 325 MG/1
650 TABLET ORAL
Status: COMPLETED | OUTPATIENT
Start: 2019-02-06 | End: 2019-02-06

## 2019-02-06 RX ADMIN — ACETAMINOPHEN 650 MG: 325 TABLET ORAL at 10:49

## 2019-02-06 NOTE — ED TRIAGE NOTES
C/o pain great toe of left foot. Reports \"tightness\" to toe but denies swelling, denies discoloration.

## 2019-02-06 NOTE — LETTER
NOTIFICATION RETURN TO WORK / SCHOOL 
 
2/6/2019 11:41 AM 
 
Ms. Alisia Sherwood [address] [facility] To Whom It May Concern: 
 
Alisia Sherwood is currently under the care of [facility]. She will return to work/school on: 2/10/19 If there are questions or concerns please have the patient contact our office. Sincerely, Carlos Estevez NP

## 2019-02-06 NOTE — ED PROVIDER NOTES
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 She states that she hit her foot on a metal pole. Skin integrity is intact. There is no obvious deformity, swelling, bruising or erythema. Good neurovascular sensation. No apparent tendon or nerve injury. Pain increases with weightbearing and palpation. She has not had any medications today prior to arrival. She has been taking tylenol with minimal relief. Past Medical History:  
Diagnosis Date  Anemia   
 receiving iron transfusions- last one 10/20/2017  Crohn's colitis ([facility] [address]  H/O  section  Herpes simplex virus (HSV) infection   
 just oral cold sores  Infertility, female   
 due to PCOS and chron's  PCOS (polycystic ovarian syndrome)  Placental abruption  Polycystic disease, ovaries  Rhesus isoimmunization affecting pregnancy Past Surgical History:  
Procedure Laterality Date   DELIVERY ONLY  2016  
  due to placental abruption;  demise  HX  SECTION    
 x 1 Family History:  
Problem Relation Age of Onset  Cancer Mother 32 Cervical  
 Migraines Mother  Psychiatric Disorder Mother  Cancer Maternal Grandmother Cervical and ovarian  Hypertension Maternal Grandmother  Psychiatric Disorder Maternal Grandmother  Diabetes Maternal Grandfather  Hypertension Maternal Grandfather  Heart Disease Maternal Grandfather  Cancer Maternal Grandfather Thyroid Social History Socioeconomic History  Marital status: SINGLE Spouse name: Not on file  Number of children: Not on file  Years of education: Not on file  Highest education level: Not on file Social Needs  Financial resource strain: Not on file  Food insecurity - worry: Not on file  Food insecurity - inability: Not on file  Transportation needs - medical: Not on file  Transportation needs - non-medical: Not on file Occupational History  Not on file Tobacco Use  Smoking status: Former Smoker Last attempt to quit: 2017 Years since quittin.7  Smokeless tobacco: Never Used Substance and Sexual Activity  Alcohol use: No  
 Drug use: No  
 Sexual activity: Yes  
  Partners: Male Birth control/protection: None Other Topics Concern  Not on file Social History Narrative  Not on file ALLERGIES: Codeine and Nsaids (non-steroidal anti-inflammatory drug) Review of Systems Constitutional: Positive for activity change. Negative for appetite change and fever. HENT: Negative for congestion. Cardiovascular: Negative for chest pain. Gastrointestinal: Negative for abdominal pain. Musculoskeletal: Positive for arthralgias, gait problem and joint swelling. Skin: Negative for rash. All other systems reviewed and are negative. Vitals:  
 19 1023 19 1027 BP:  (P) 127/83 Pulse: 69 (P) 60 Resp:  (P) 18 Temp:  (P) 98.2 °F (36.8 °C) SpO2: 100% (P) 100% Physical Exam  
Constitutional: She appears well-developed and well-nourished. 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  
Right Ear: External ear normal.  
Left Ear: External ear normal.  
Mouth/Throat: Oropharynx is clear and moist.  
Neck: Normal range of motion. Neck supple. Cardiovascular: Normal rate and regular rhythm. Pulmonary/Chest: Effort normal and breath sounds normal.  
Abdominal: Soft. Bowel sounds are normal.  
Musculoskeletal: Normal range of motion. She exhibits tenderness. She exhibits no deformity. Left foot is tender, ?swollen, Skin integrity is intact. There is no obvious deformity, bruising or erythema. Good neurovascular sensation. No apparent tendon or nerve injury. Neurological: She is alert. Skin: Skin is warm and dry. No rash noted. Psychiatric: She has a normal mood and affect. Nursing note and vitals reviewed. MDM Procedures Pt was placed in a post op shoe by the RN; good neurovascular sensation before and after the splint application. 10:34 AM 
Patient's results and plan of care have been reviewed with her. Patient has verbally conveyed her understanding and agreement of her signs, symptoms, diagnosis, treatment and prognosis and additionally agrees to follow up as recommended or return to the Emergency Room should her condition change prior to follow-up. Discharge instructions have also been provided to the patient with some educational information regarding her diagnosis as well a list of reasons why she would want to return to the ER prior to her follow-up appointment should her condition change. Ross Chowdhury NP

## 2019-02-06 NOTE — DISCHARGE INSTRUCTIONS

## 2019-03-19 NOTE — PROGRESS NOTES
2115: Patient received from ER to . Report taken from [facility].  2120: Patient placed on enteric contact isolation per order. Maintained per policy. 0405: Stool sample walked down to lab.  0620: Labs drawn and sent to lab. 5849-8453: Hourly rounds completed. 0118-3150: Hourly rounds completed. 6271-7042: Hourly rounds completed. Tetracycline Pregnancy And Lactation Text: This medication is Pregnancy Category D and not consider safe during pregnancy. It is also excreted in breast milk. Topical Sulfur Applications Counseling: Topical Sulfur Counseling: Patient counseled that this medication may cause skin irritation or allergic reactions.  In the event of skin irritation, the patient was advised to reduce the amount of the drug applied or use it less frequently.   The patient verbalized understanding of the proper use and possible adverse effects of topical sulfur application.  All of the patient's questions and concerns were addressed. Topical Retinoid Pregnancy And Lactation Text: This medication is Pregnancy Category C. It is unknown if this medication is excreted in breast milk. Minocycline Counseling: Patient advised regarding possible photosensitivity and discoloration of the teeth, skin, lips, tongue and gums.  Patient instructed to avoid sunlight, if possible.  When exposed to sunlight, patients should wear protective clothing, sunglasses, and sunscreen.  The patient was instructed to call the office immediately if the following severe adverse effects occur:  hearing changes, easy bruising/bleeding, severe headache, or vision changes.  The patient verbalized understanding of the proper use and possible adverse effects of minocycline.  All of the patient's questions and concerns were addressed. Erythromycin Pregnancy And Lactation Text: This medication is Pregnancy Category B and is considered safe during pregnancy. It is also excreted in breast milk. Isotretinoin Counseling: Patient should get monthly blood tests, not donate blood, not drive at night if vision affected, not share medication, and not undergo elective surgery for 6 months after tx completed. Side effects reviewed, pt to contact office should one occur. Dapsone Pregnancy And Lactation Text: This medication is Pregnancy Category C and is not considered safe during pregnancy or breast feeding. Bactrim Counseling:  I discussed with the patient the risks of sulfa antibiotics including but not limited to GI upset, allergic reaction, drug rash, diarrhea, dizziness, photosensitivity, and yeast infections.  Rarely, more serious reactions can occur including but not limited to aplastic anemia, agranulocytosis, methemoglobinemia, blood dyscrasias, liver or kidney failure, lung infiltrates or desquamative/blistering drug rashes. Bactrim Pregnancy And Lactation Text: This medication is Pregnancy Category D and is known to cause fetal risk.  It is also excreted in breast milk. Topical Sulfur Applications Pregnancy And Lactation Text: This medication is Pregnancy Category C and has an unknown safety profile during pregnancy. It is unknown if this topical medication is excreted in breast milk. Benzoyl Peroxide Counseling: Patient counseled that medicine may cause skin irritation and bleach clothing.  In the event of skin irritation, the patient was advised to reduce the amount of the drug applied or use it less frequently.   The patient verbalized understanding of the proper use and possible adverse effects of benzoyl peroxide.  All of the patient's questions and concerns were addressed. Tazorac Counseling:  Patient advised that medication is irritating and drying.  Patient may need to apply sparingly and wash off after an hour before eventually leaving it on overnight.  The patient verbalized understanding of the proper use and possible adverse effects of tazorac.  All of the patient's questions and concerns were addressed. Spironolactone Counseling: Patient advised regarding risks of diarrhea, abdominal pain, hyperkalemia, birth defects (for female patients), liver toxicity and renal toxicity. The patient may need blood work to monitor liver and kidney function and potassium levels while on therapy. The patient verbalized understanding of the proper use and possible adverse effects of spironolactone.  All of the patient's questions and concerns were addressed. Tazorac Pregnancy And Lactation Text: This medication is not safe during pregnancy. It is unknown if this medication is excreted in breast milk. Isotretinoin Pregnancy And Lactation Text: This medication is Pregnancy Category X and is considered extremely dangerous during pregnancy. It is unknown if it is excreted in breast milk. Doxycycline Counseling:  Patient counseled regarding possible photosensitivity and increased risk for sunburn.  Patient instructed to avoid sunlight, if possible.  When exposed to sunlight, patients should wear protective clothing, sunglasses, and sunscreen.  The patient was instructed to call the office immediately if the following severe adverse effects occur:  hearing changes, easy bruising/bleeding, severe headache, or vision changes.  The patient verbalized understanding of the proper use and possible adverse effects of doxycycline.  All of the patient's questions and concerns were addressed. Doxycycline Pregnancy And Lactation Text: This medication is Pregnancy Category D and not consider safe during pregnancy. It is also excreted in breast milk but is considered safe for shorter treatment courses. Birth Control Pills Counseling: Birth Control Pill Counseling: I discussed with the patient the potential side effects of OCPs including but not limited to increased risk of stroke, heart attack, thrombophlebitis, deep venous thrombosis, hepatic adenomas, breast changes, GI upset, headaches, and depression.  The patient verbalized understanding of the proper use and possible adverse effects of OCPs. All of the patient's questions and concerns were addressed. Include Pregnancy/Lactation Warning?: No Benzoyl Peroxide Pregnancy And Lactation Text: This medication is Pregnancy Category C. It is unknown if benzoyl peroxide is excreted in breast milk. Azithromycin Counseling:  I discussed with the patient the risks of azithromycin including but not limited to GI upset, allergic reaction, drug rash, diarrhea, and yeast infections. Topical Clindamycin Counseling: Patient counseled that this medication may cause skin irritation or allergic reactions.  In the event of skin irritation, the patient was advised to reduce the amount of the drug applied or use it less frequently.   The patient verbalized understanding of the proper use and possible adverse effects of clindamycin.  All of the patient's questions and concerns were addressed. Spironolactone Pregnancy And Lactation Text: This medication can cause feminization of the male fetus and should be avoided during pregnancy. The active metabolite is also found in breast milk. High Dose Vitamin A Counseling: Side effects reviewed, pt to contact office should one occur. High Dose Vitamin A Pregnancy And Lactation Text: High dose vitamin A therapy is contraindicated during pregnancy and breast feeding. Erythromycin Counseling:  I discussed with the patient the risks of erythromycin including but not limited to GI upset, allergic reaction, drug rash, diarrhea, increase in liver enzymes, and yeast infections. Topical Retinoid counseling:  Patient advised to apply a pea-sized amount only at bedtime and wait 30 minutes after washing their face before applying.  If too drying, patient may add a non-comedogenic moisturizer. The patient verbalized understanding of the proper use and possible adverse effects of retinoids.  All of the patient's questions and concerns were addressed. Birth Control Pills Pregnancy And Lactation Text: This medication should be avoided if pregnant and for the first 30 days post-partum. Dapsone Counseling: I discussed with the patient the risks of dapsone including but not limited to hemolytic anemia, agranulocytosis, rashes, methemoglobinemia, kidney failure, peripheral neuropathy, headaches, GI upset, and liver toxicity.  Patients who start dapsone require monitoring including baseline LFTs and weekly CBCs for the first month, then every month thereafter.  The patient verbalized understanding of the proper use and possible adverse effects of dapsone.  All of the patient's questions and concerns were addressed. Detail Level: Detailed Azithromycin Pregnancy And Lactation Text: This medication is considered safe during pregnancy and is also secreted in breast milk. Tetracycline Counseling: Patient counseled regarding possible photosensitivity and increased risk for sunburn.  Patient instructed to avoid sunlight, if possible.  When exposed to sunlight, patients should wear protective clothing, sunglasses, and sunscreen.  The patient was instructed to call the office immediately if the following severe adverse effects occur:  hearing changes, easy bruising/bleeding, severe headache, or vision changes.  The patient verbalized understanding of the proper use and possible adverse effects of tetracycline.  All of the patient's questions and concerns were addressed. Patient understands to avoid pregnancy while on therapy due to potential birth defects. Topical Clindamycin Pregnancy And Lactation Text: This medication is Pregnancy Category B and is considered safe during pregnancy. It is unknown if it is excreted in breast milk.

## 2019-03-28 ENCOUNTER — HOSPITAL ENCOUNTER (EMERGENCY)
Age: 24
Discharge: HOME | End: 2019-03-28
Attending: EMERGENCY MEDICINE
Payer: MEDICAID

## 2019-03-28 VITALS
SYSTOLIC BLOOD PRESSURE: 116 MMHG | OXYGEN SATURATION: 99 % | TEMPERATURE: 98.2 F | HEART RATE: 84 BPM | DIASTOLIC BLOOD PRESSURE: 75 MMHG | RESPIRATION RATE: 15 BRPM

## 2019-03-28 DIAGNOSIS — N76.0 BV (BACTERIAL VAGINOSIS): Primary | ICD-10-CM

## 2019-03-28 DIAGNOSIS — B96.89 BV (BACTERIAL VAGINOSIS): Primary | ICD-10-CM

## 2019-03-28 DIAGNOSIS — L29.9 ITCHING: ICD-10-CM

## 2019-03-28 LAB
CLUE CELLS VAG QL WET PREP: NORMAL
FLUAV AG NPH QL IA: NEGATIVE
FLUBV AG NOSE QL IA: NEGATIVE
KOH PREP SPEC: NORMAL
SERVICE CMNT-IMP: NORMAL
T VAGINALIS VAG QL WET PREP: NORMAL

## 2019-03-28 PROCEDURE — 87210 SMEAR WET MOUNT SALINE/INK: CPT

## 2019-03-28 PROCEDURE — 87804 INFLUENZA ASSAY W/OPTIC: CPT

## 2019-03-28 PROCEDURE — 74011250637 HC RX REV CODE- 250/637: Performed by: EMERGENCY MEDICINE

## 2019-03-28 PROCEDURE — 74011636637 HC RX REV CODE- 636/637: Performed by: EMERGENCY MEDICINE

## 2019-03-28 PROCEDURE — 99284 EMERGENCY DEPT VISIT MOD MDM: CPT

## 2019-03-28 PROCEDURE — 87491 CHLMYD TRACH DNA AMP PROBE: CPT

## 2019-03-28 RX ORDER — PREDNISONE 20 MG/1
60 TABLET ORAL
Status: COMPLETED | OUTPATIENT
Start: 2019-03-28 | End: 2019-03-28

## 2019-03-28 RX ORDER — FAMOTIDINE 20 MG/1
20 TABLET, FILM COATED ORAL
Status: COMPLETED | OUTPATIENT
Start: 2019-03-28 | End: 2019-03-28

## 2019-03-28 RX ORDER — CETIRIZINE HCL 10 MG
10 TABLET ORAL
Status: COMPLETED | OUTPATIENT
Start: 2019-03-28 | End: 2019-03-28

## 2019-03-28 RX ORDER — CLINDAMYCIN HYDROCHLORIDE 300 MG/1
300 CAPSULE ORAL 4 TIMES DAILY
Qty: 28 CAP | Refills: 0 | Status: SHIPPED | OUTPATIENT
Start: 2019-03-28 | End: 2019-04-04

## 2019-03-28 RX ORDER — FLUCONAZOLE 100 MG/1
150 TABLET ORAL
Status: COMPLETED | OUTPATIENT
Start: 2019-03-28 | End: 2019-03-28

## 2019-03-28 RX ORDER — HYDROXYZINE 50 MG/1
50 TABLET, FILM COATED ORAL
Qty: 20 TAB | Refills: 0 | Status: SHIPPED | OUTPATIENT
Start: 2019-03-28 | End: 2019-04-07

## 2019-03-28 RX ADMIN — FLUCONAZOLE 150 MG: 100 TABLET ORAL at 12:00

## 2019-03-28 RX ADMIN — CETIRIZINE HYDROCHLORIDE 10 MG: 10 TABLET, FILM COATED ORAL at 11:18

## 2019-03-28 RX ADMIN — FAMOTIDINE 20 MG: 20 TABLET ORAL at 11:18

## 2019-03-28 RX ADMIN — PREDNISONE 60 MG: 20 TABLET ORAL at 11:18

## 2019-03-28 NOTE — LETTER
NOTIFICATION RETURN TO WORK / SCHOOL 
 
3/28/2019 12:27 PM 
 
Ms. Aryan Perdomo [address] [facility] To Whom It May Concern: 
 
Aryan Perdomo is currently under the care of [facility] [place] [facility]. She will return to work/school on: 3/31/19 If there are questions or concerns please have the patient contact our office. Sincerely, Matthieu Rock NP

## 2019-03-28 NOTE — DISCHARGE INSTRUCTIONS
Patient Education        Bacterial Vaginosis: Care Instructions  Your Care Instructions    Bacterial vaginosis is a type of vaginal infection. It is caused by excess growth of certain bacteria that are normally found in the vagina. Symptoms can include itching, swelling, pain when you urinate or have sex, and a gray or yellow discharge with a \"fishy\" odor. It is not considered an infection that is spread through sexual contact. Although symptoms can be annoying and uncomfortable, bacterial vaginosis does not usually cause other health problems. However, if you have it while you are pregnant, it can cause complications. While the infection may go away on its own, most doctors use antibiotics to treat it. You may have been prescribed pills or vaginal cream. With treatment, bacterial vaginosis usually clears up in 5 to 7 days. Follow-up care is a key part of your treatment and safety. Be sure to make and go to all appointments, and call your doctor if you are having problems. It's also a good idea to know your test results and keep a list of the medicines you take. How can you care for yourself at home? · Take your antibiotics as directed. Do not stop taking them just because you feel better. You need to take the full course of antibiotics. · Do not eat or drink anything that contains alcohol if you are taking metronidazole (Flagyl). · Keep using your medicine if you start your period. Use pads instead of tampons while using a vaginal cream or suppository. Tampons can absorb the medicine. · Wear loose cotton clothing. Do not wear nylon and other materials that hold body heat and moisture close to the skin. · Do not scratch. Relieve itching with a cold pack or a cool bath. · Do not wash your vaginal area more than once a day. Use plain water or a mild, unscented soap. Do not douche. When should you call for help?   Watch closely for changes in your health, and be sure to contact your doctor if:    · You have unexpected vaginal bleeding.     · You have a fever.     · You have new or increased pain in your vagina or pelvis.     · You are not getting better after 1 week.     · Your symptoms return after you finish the course of your medicine. Where can you learn more? Go to http://giselle-tara.info/. Kd Nino in the search box to learn more about \"Bacterial Vaginosis: Care Instructions. \"  Current as of: May 14, 2018  Content Version: 11.9  © 4180-4149 [employer]. Care instructions adapted under license by [employer] (which disclaims liability or warranty for this information). If you have questions about a medical condition or this instruction, always ask your healthcare professional. [address] any warranty or liability for your use of this information.

## 2019-03-28 NOTE — ED TRIAGE NOTES
Pt started on flagyl for a vaginal bacterial infection (after IUD placement) and had reaction where her \"lady parts\" started itching and became swollen and tongue felt itchy. Taking 50 mg benadryl daily, resulting in drowsiness. Was seen by [facility]. Reports also have generalized muscle aches and fever yesterday.

## 2019-03-28 NOTE — ED PROVIDER NOTES
HPI Pt states that she was evaluated at the [place] [facility] yesterday and diagnosed with bacterial vaginosis. She took one dose of flagyl and broke out in hives and swelling. She states that she notified their office and was told to take benadryl and come to the ED. She states that she had a fever last night and this morning and is unsure if it is related. Denies cough,cold symptoms,headache, neck pain, visual changes,or focal weakness. Denies any difficulty breathing, difficulty swallowing, SOB, chest pain or abdominal pain. Denies any nausea, vomiting or diarrhea. She states that she took benadryl and tylenol around 4am. 
 
 
Past Medical History:  
Diagnosis Date  Anemia   
 receiving iron transfusions- last one 10/20/2017  Crohn's colitis ([address]  H/O  section  Herpes simplex virus (HSV) infection   
 just oral cold sores  Infertility, female   
 due to PCOS and chron's  PCOS (polycystic ovarian syndrome)  Placental abruption  Polycystic disease, ovaries  Rhesus isoimmunization affecting pregnancy Past Surgical History:  
Procedure Laterality Date   DELIVERY ONLY  2016  
  due to placental abruption;  demise  HX  SECTION    
 x 1 Family History:  
Problem Relation Age of Onset  Cancer Mother 32 Cervical  
 Migraines Mother  Psychiatric Disorder Mother  Cancer Maternal Grandmother Cervical and ovarian  Hypertension Maternal Grandmother  Psychiatric Disorder Maternal Grandmother  Diabetes Maternal Grandfather  Hypertension Maternal Grandfather  Heart Disease Maternal Grandfather  Cancer Maternal Grandfather Thyroid Social History Socioeconomic History  Marital status: SINGLE Spouse name: Not on file  Number of children: Not on file  Years of education: Not on file  Highest education level: Not on file Occupational History  Not on file Social Needs  Financial resource strain: Not on file  Food insecurity:  
  Worry: Not on file Inability: Not on file  Transportation needs:  
  Medical: Not on file Non-medical: Not on file Tobacco Use  Smoking status: Current Some Day Smoker Packs/day: 0.50 Last attempt to quit: 2017 Years since quittin.9  Smokeless tobacco: Never Used Substance and Sexual Activity  Alcohol use: Yes Comment: socially  Drug use: No  
 Sexual activity: Yes  
  Partners: Male Birth control/protection: None Lifestyle  Physical activity:  
  Days per week: Not on file Minutes per session: Not on file  Stress: Not on file Relationships  Social connections:  
  Talks on phone: Not on file Gets together: Not on file Attends [employer] service: Not on file Active member of club or organization: Not on file Attends meetings of clubs or organizations: Not on file Relationship status: Not on file  Intimate partner violence:  
  Fear of current or ex partner: Not on file Emotionally abused: Not on file Physically abused: Not on file Forced sexual activity: Not on file Other Topics Concern  Not on file Social History Narrative  Not on file ALLERGIES: Codeine and Nsaids (non-steroidal anti-inflammatory drug) Review of Systems Constitutional: Positive for fever. Negative for activity change. HENT: Negative for congestion, sore throat and trouble swallowing. Respiratory: Negative for cough and shortness of breath. Cardiovascular: Negative for chest pain, palpitations and leg swelling. Gastrointestinal: Negative for abdominal pain, nausea and vomiting. Genitourinary: Positive for vaginal discharge. Negative for dysuria. All other systems reviewed and are negative. Vitals:  
 19 8147 Pulse: (!) 112 SpO2: 98% Physical Exam  
 Constitutional: She is oriented to person, place, and time. White female, social smoker HENT:  
Right Ear: External ear normal.  
Left Ear: External ear normal.  
Nose: Nose normal.  
Mouth/Throat: Oropharynx is clear and moist.  
Neck: Normal range of motion. Neck supple. Cardiovascular: Normal rate and regular rhythm. Pulmonary/Chest: Effort normal and breath sounds normal.  
Abdominal: Soft. Bowel sounds are normal.  
Genitourinary:  
Genitourinary Comments: Pelvic exam: Normal labia, vulva without rash or lesions; white vaginal discharge; no bleeding;  no cervical motion tenderness; cervix is closed. Musculoskeletal: Normal range of motion. Lymphadenopathy:  
  She has no cervical adenopathy. Neurological: She is alert and oriented to person, place, and time. Skin: Skin is warm and dry. Rash noted. Red itchy, patchy resolving hives on inner thighs and groin area Psychiatric: She has a normal mood and affect. Nursing note and vitals reviewed. MDM Procedures Reviewed skin recommendations with  Aryan Perdomo. Follow up with the dermatologist or OB/GYN if no improvement for further evaluation. Use only unscented soaps and lotions. Patient's results and plan of care have been reviewed with her. Patient and/or family have verbally conveyed their understanding and agreement of the patient's signs, symptoms, diagnosis, treatment and prognosis and additionally agree to follow up as recommended or return to the Emergency Room should her condition change prior to follow-up. Discharge instructions have also been provided to the patient with some educational information regarding her diagnosis as well a list of reasons why she would want to return to the ER prior to her follow-up appointment should her  condition change. Matthieu Rock NP

## 2019-03-29 LAB
C TRACH DNA SPEC QL NAA+PROBE: NEGATIVE
N GONORRHOEA DNA SPEC QL NAA+PROBE: NEGATIVE
SAMPLE TYPE: NORMAL
SERVICE CMNT-IMP: NORMAL
SPECIMEN SOURCE: NORMAL

## 2019-09-04 ENCOUNTER — HOSPITAL ENCOUNTER (EMERGENCY)
Age: 24
Discharge: HOME | End: 2019-09-04
Attending: EMERGENCY MEDICINE
Payer: MEDICAID

## 2019-09-04 VITALS
BODY MASS INDEX: 25.32 KG/M2 | HEIGHT: 60 IN | TEMPERATURE: 99.3 F | RESPIRATION RATE: 18 BRPM | OXYGEN SATURATION: 99 % | HEART RATE: 88 BPM | DIASTOLIC BLOOD PRESSURE: 93 MMHG | SYSTOLIC BLOOD PRESSURE: 131 MMHG | WEIGHT: 128.97 LBS

## 2019-09-04 DIAGNOSIS — B34.9 VIRAL SYNDROME: ICD-10-CM

## 2019-09-04 DIAGNOSIS — R51.9 ACUTE NONINTRACTABLE HEADACHE, UNSPECIFIED HEADACHE TYPE: Primary | ICD-10-CM

## 2019-09-04 LAB
APPEARANCE UR: ABNORMAL
BACTERIA URNS QL MICRO: ABNORMAL /HPF
BILIRUB UR QL: NEGATIVE
COLOR UR: ABNORMAL
EPITH CASTS URNS QL MICRO: ABNORMAL /LPF
FLUAV AG NPH QL IA: NEGATIVE
FLUBV AG NOSE QL IA: NEGATIVE
GLUCOSE UR STRIP.AUTO-MCNC: NEGATIVE MG/DL
HCG UR QL: NEGATIVE
HGB UR QL STRIP: ABNORMAL
HYALINE CASTS URNS QL MICRO: ABNORMAL /LPF (ref 0–5)
KETONES UR QL STRIP.AUTO: NEGATIVE MG/DL
LEUKOCYTE ESTERASE UR QL STRIP.AUTO: NEGATIVE
NITRITE UR QL STRIP.AUTO: NEGATIVE
PH UR STRIP: 7 [PH] (ref 5–8)
PROT UR STRIP-MCNC: ABNORMAL MG/DL
RBC #/AREA URNS HPF: ABNORMAL /HPF (ref 0–5)
SP GR UR REFRACTOMETRY: 1.02 (ref 1–1.03)
UA: UC IF INDICATED,UAUC: ABNORMAL
UROBILINOGEN UR QL STRIP.AUTO: 1 EU/DL (ref 0.2–1)
WBC URNS QL MICRO: ABNORMAL /HPF (ref 0–4)

## 2019-09-04 PROCEDURE — 87186 SC STD MICRODIL/AGAR DIL: CPT

## 2019-09-04 PROCEDURE — 87086 URINE CULTURE/COLONY COUNT: CPT

## 2019-09-04 PROCEDURE — 87077 CULTURE AEROBIC IDENTIFY: CPT

## 2019-09-04 PROCEDURE — 74011250636 HC RX REV CODE- 250/636: Performed by: EMERGENCY MEDICINE

## 2019-09-04 PROCEDURE — 81025 URINE PREGNANCY TEST: CPT

## 2019-09-04 PROCEDURE — 96365 THER/PROPH/DIAG IV INF INIT: CPT

## 2019-09-04 PROCEDURE — 74011250637 HC RX REV CODE- 250/637: Performed by: EMERGENCY MEDICINE

## 2019-09-04 PROCEDURE — 87804 INFLUENZA ASSAY W/OPTIC: CPT

## 2019-09-04 PROCEDURE — 96375 TX/PRO/DX INJ NEW DRUG ADDON: CPT

## 2019-09-04 PROCEDURE — 81001 URINALYSIS AUTO W/SCOPE: CPT

## 2019-09-04 PROCEDURE — 74011000250 HC RX REV CODE- 250: Performed by: EMERGENCY MEDICINE

## 2019-09-04 PROCEDURE — 96366 THER/PROPH/DIAG IV INF ADDON: CPT

## 2019-09-04 PROCEDURE — 99283 EMERGENCY DEPT VISIT LOW MDM: CPT

## 2019-09-04 RX ORDER — KETOROLAC TROMETHAMINE 30 MG/ML
15 INJECTION, SOLUTION INTRAMUSCULAR; INTRAVENOUS
Status: COMPLETED | OUTPATIENT
Start: 2019-09-04 | End: 2019-09-04

## 2019-09-04 RX ORDER — MAGNESIUM SULFATE HEPTAHYDRATE 40 MG/ML
2 INJECTION, SOLUTION INTRAVENOUS ONCE
Status: COMPLETED | OUTPATIENT
Start: 2019-09-04 | End: 2019-09-04

## 2019-09-04 RX ORDER — METOCLOPRAMIDE HYDROCHLORIDE 5 MG/ML
5 INJECTION INTRAMUSCULAR; INTRAVENOUS
Status: COMPLETED | OUTPATIENT
Start: 2019-09-04 | End: 2019-09-04

## 2019-09-04 RX ORDER — DIPHENHYDRAMINE HYDROCHLORIDE 50 MG/ML
25 INJECTION, SOLUTION INTRAMUSCULAR; INTRAVENOUS
Status: COMPLETED | OUTPATIENT
Start: 2019-09-04 | End: 2019-09-04

## 2019-09-04 RX ORDER — ACETAMINOPHEN 500 MG
1000 TABLET ORAL ONCE
Status: COMPLETED | OUTPATIENT
Start: 2019-09-04 | End: 2019-09-04

## 2019-09-04 RX ORDER — BUTALBITAL, ACETAMINOPHEN AND CAFFEINE 300; 40; 50 MG/1; MG/1; MG/1
1 CAPSULE ORAL
Qty: 30 CAP | Refills: 0 | Status: SHIPPED | OUTPATIENT
Start: 2019-09-04 | End: 2019-10-04

## 2019-09-04 RX ADMIN — DIPHENHYDRAMINE HYDROCHLORIDE 25 MG: 50 INJECTION, SOLUTION INTRAMUSCULAR; INTRAVENOUS at 15:09

## 2019-09-04 RX ADMIN — SODIUM CHLORIDE 1000 ML: 900 INJECTION, SOLUTION INTRAVENOUS at 15:13

## 2019-09-04 RX ADMIN — ACETAMINOPHEN 1000 MG: 500 TABLET ORAL at 15:08

## 2019-09-04 RX ADMIN — MAGNESIUM SULFATE HEPTAHYDRATE 2 G: 40 INJECTION, SOLUTION INTRAVENOUS at 15:57

## 2019-09-04 RX ADMIN — KETOROLAC TROMETHAMINE 15 MG: 30 INJECTION, SOLUTION INTRAMUSCULAR at 15:09

## 2019-09-04 RX ADMIN — PROCHLORPERAZINE EDISYLATE 5 MG: 5 INJECTION INTRAMUSCULAR; INTRAVENOUS at 15:57

## 2019-09-04 RX ADMIN — METOCLOPRAMIDE 5 MG: 5 INJECTION, SOLUTION INTRAMUSCULAR; INTRAVENOUS at 15:09

## 2019-09-04 NOTE — ED PROVIDER NOTES
EMERGENCY DEPARTMENT HISTORY AND PHYSICAL EXAM      Date: 9/4/2019  Patient Name: Bisi Silva    History of Presenting Illness     Chief Complaint   Patient presents with    Headache     x four days with fever, sore throat, productive cough, nasal congestion; sent from urgent care for elevated WBC 12.2; strep neg. Took \"5 tablets\" of tylenol 0930       History Provided By: Patient    HPI: Bisi Silva, 25 y.o. female with history of Crohn's disease currently on mesalamine presents to the ED with cc of headache. Headache is described as 9 out of 10 in severity and is described as a throbbing located in the bitemporal area without radiation. Symptoms are worse with standing and with exertion. She does endorse recent fever with cold-like symptoms including nasal congestion, sneezing, rhinorrhea, sore throat over the past few days. No sick contacts. She denies any neck stiffness, neck pain. She does endorse photophobia. She does get headaches but states that she does not get regular headaches or migraines. she was seen at [facility] earlier today and sent to the emergency department because of a finding of leukocytosis. There are no other complaints, changes, or physical findings at this time. PCP: None    No current facility-administered medications on file prior to encounter. Current Outpatient Medications on File Prior to Encounter   Medication Sig Dispense Refill    traMADol (ULTRAM) 50 mg tablet Take 1 Tab by mouth every eight (8) hours as needed for Pain. Max Daily Amount: 150 mg. 12 Tab 0    predniSONE (STERAPRED) 5 mg dose pack Take 4 tabs for two days, three tabs for two days, two tabs for two days and one tab for two days. 20 Tab 0    oxyCODONE-acetaminophen (PERCOCET) 5-325 mg per tablet Take 1 Tab by mouth every four (4) hours as needed for Pain. Max Daily Amount: 6 Tabs. 20 Tab 0    pediatric multivitamin no.49 (FLINTSTONES GUMMIES) chew Take 2 Tabs by mouth daily. Indications: VITAMIN DEFICIENCY PREVENTION      PNV No12-Iron-FA-DSS-OM-3 29 mg iron-1 mg -50 mg CPKD Take 1 Tab by mouth daily. Indications: Pregnancy      pantoprazole (PROTONIX) 40 mg tablet Take 1 Tab by mouth daily. 40 Tab 2    mesalamine (LIALDA) 1.2 gram delayed release tablet Take 2 Tabs by mouth Daily (before breakfast). 36 Tab 3       Past History     Past Medical History:  Past Medical History:   Diagnosis Date    Anemia     receiving iron transfusions- last one 10/20/2017    Crohn's colitis ([facility] [address]     H/O  section     Herpes simplex virus (HSV) infection     just oral cold sores    Infertility, female     due to PCOS and chron's    PCOS (polycystic ovarian syndrome)     Placental abruption     Polycystic disease, ovaries     Rhesus isoimmunization affecting pregnancy        Past Surgical History:  Past Surgical History:   Procedure Laterality Date     DELIVERY ONLY  2016     due to placental abruption;  demise    HX  SECTION      x 1       Family History:  Family History   Problem Relation Age of Onset    Cancer Mother 32        Cervical    Migraines Mother     Psychiatric Disorder Mother     Cancer Maternal Grandmother         Cervical and ovarian    Hypertension Maternal Grandmother     Psychiatric Disorder Maternal Grandmother     Diabetes Maternal Grandfather     Hypertension Maternal Grandfather     Heart Disease Maternal Grandfather     Cancer Maternal Grandfather         Thyroid        Social History:  Social History     Tobacco Use    Smoking status: Current Some Day Smoker     Packs/day: 0.50     Last attempt to quit: 2017     Years since quittin.3    Smokeless tobacco: Never Used   Substance Use Topics    Alcohol use: Yes     Comment: socially    Drug use: No       Allergies:   Allergies   Allergen Reactions    Codeine Anaphylaxis     Tolerates oxycodone    Nsaids (Non-Steroidal Anti-Inflammatory Drug) Other (comments)     Hx crohns disease          Review of Systems   Review of Systems   Constitutional: Positive for chills, fatigue and fever. HENT: Positive for congestion, postnasal drip, rhinorrhea and sore throat. Eyes: Negative for visual disturbance. Respiratory: Negative for cough and shortness of breath. Cardiovascular: Negative for chest pain and leg swelling. Gastrointestinal: Negative for abdominal pain, nausea and vomiting. Genitourinary: Negative. Musculoskeletal: Negative for back pain and gait problem. Skin: Negative for color change and rash. Neurological: Positive for headaches. Negative for dizziness, weakness, light-headedness and numbness. Hematological: Does not bruise/bleed easily. All other systems reviewed and are negative. Physical Exam   Physical Exam   Constitutional: She is oriented to person, place, and time. She appears well-developed and well-nourished. No distress. HENT:   Head: Normocephalic and atraumatic. Eyes: Pupils are equal, round, and reactive to light. EOM are normal.   Neck: Normal range of motion. Neck supple. No JVD present. Cardiovascular: Normal rate, regular rhythm and normal heart sounds. No murmur heard. Pulmonary/Chest: Effort normal and breath sounds normal. No respiratory distress. She has no wheezes. Abdominal: Soft. She exhibits no distension. There is no tenderness. There is no rebound and no guarding. Musculoskeletal: Normal range of motion. She exhibits no edema. Neurological: She is alert and oriented to person, place, and time. Cranial nerves intact, motor 5 out of 5 throughout, sensation intact, no cerebellar deficits. Ambulatory in ED without difficulty. Skin: Skin is warm. No rash noted. She is not diaphoretic. No erythema.        Diagnostic Study Results     Labs -     Recent Results (from the past 12 hour(s))   URINALYSIS W/ REFLEX CULTURE    Collection Time: 09/04/19  2:04 PM   Result Value Ref Range    Color DARK YELLOW      Appearance TURBID (A) CLEAR      Specific gravity 1.023 1.003 - 1.030      pH (UA) 7.0 5.0 - 8.0      Protein TRACE (A) NEG mg/dL    Glucose NEGATIVE  NEG mg/dL    Ketone NEGATIVE  NEG mg/dL    Bilirubin NEGATIVE  NEG      Blood MODERATE (A) NEG      Urobilinogen 1.0 0.2 - 1.0 EU/dL    Nitrites NEGATIVE  NEG      Leukocyte Esterase NEGATIVE  NEG      WBC 0-4 0 - 4 /hpf    RBC 20-50 0 - 5 /hpf    Epithelial cells MODERATE (A) FEW /lpf    Bacteria 1+ (A) NEG /hpf    UA:UC IF INDICATED URINE CULTURE ORDERED (A) CNI      Hyaline cast 2-5 0 - 5 /lpf   HCG URINE, QL. - POC    Collection Time: 09/04/19  3:16 PM   Result Value Ref Range    Pregnancy test,urine (POC) NEGATIVE  NEG         Radiologic Studies -   No orders to display     CT Results  (Last 48 hours)    None        CXR Results  (Last 48 hours)    None          Medical Decision Making   I am the first provider for this patient. I reviewed the vital signs, available nursing notes, past medical history, past surgical history, family history and social history. Vital Signs-Reviewed the patient's vital signs. Patient Vitals for the past 12 hrs:   Temp Pulse Resp BP SpO2   09/04/19 1255 99.3 °F (37.4 °C) 88 18 (!) 131/93 99 %       Records Reviewed: Nursing Notes, Old Medical Records, Previous Radiology Studies and Previous Laboratory Studies    Provider Notes (Medical Decision Making): This is a 80-year-old female here with headache and viral syndrome with low-grade temperature in the ED. On examination she appears chronically well nontoxic. She is afebrile and vital signs are stable throughout entire ED stay. She does not have any meningitic symptoms. She appears clinically well nontoxic and does not show any meningeal signs. Urinalysis is not a source of infection. Pregnancy negative. I did offer LP however patient would like to decline at this time.   I administered migraine cocktail including Toradol, Tylenol, Reglan, Compazine, IV fluids, magnesium and she felt significantly improved with pain rated 9/10 down to 5/10 and on reexamination down to 3/10. She like to be discharged home to follow-up with PCP. She was given referral to many PCPs in the area. I will send off a flu swab. She was given strict return ED precautions. Agrees with plan as above. Questions answered and she is discharged home in stable condition. ED Course:   Initial assessment performed. The patients presenting problems have been discussed, and they are in agreement with the care plan formulated and outlined with them. I have encouraged them to ask questions as they arise throughout their visit. Critical Care Time:   0    Disposition:  Home    PLAN:  1. Current Discharge Medication List        2. Follow-up Information     Follow up With Specialties Details Why Contact Info    Primary Care Physician  Call in 1 week to establish care         Return to ED if worse     Diagnosis     Clinical Impression:   1. Acute nonintractable headache, unspecified headache type    2. Viral syndrome        Attestations:    Lyubov Wharton MD    Please note that this dictation was completed with [employer], the computer voice recognition software. Quite often unanticipated grammatical, syntax, homophones, and other interpretive errors are inadvertently transcribed by the computer software. Please disregard these errors. Please excuse any errors that have escaped final proofreading. Thank you.

## 2019-09-04 NOTE — DISCHARGE INSTRUCTIONS
You were evaluated in the emergency department for headache and viral syndrome. Your examination was reassuring as was your work-up including urinalysis. It will be important for you to follow-up with your primary care physician in 3-7 days to establish care and for further evaluation of your symptoms. If you develop worsening symptoms such as worsening fevers or headache, please return to the emergency department immediately.     Local Primary Care Physicians  [facility] [phone]  MD Shravan Garcia MD Estevan Boon, MD [facility] Doctors [phone]  Tacho Gavin, P  Johnny Salinas, MD Sharmaine Metz, MD Lisa Daley [place] [address] [phone]  MD [place] [address] [facility] [phone]  MD Domonique Ferrari, MD Griselda Cruz, MD Maria Esther Benitez MD   [facility] [phone]  KIKE RAZA , MD Rossi Israel, MD Ashley Lorenz, NP [address] [phone]  Nahum Fox, MD May Bryant, MD Luis M Maloney, MD Ernestina Isidro, MD Elizabet Osman, MD Aung Ponce, MD Yissel Prince MD   [address] [facility] monique [facility] [phone]  MD Mayuri Bo, LYLE Parker, MD Ariane Evans MD Larita Hanks, MD Carmen Riojas MD   5215 [facility] [phone]  MD Debbie Hogan, P  Gurdeep Devries, LYLE Fagan, MD Basilio Elizalde, MD Louie Good MD [facility] [phone]  MD Ree Cuadra, MD Debbie Ramirez, MD Jinny Copeland MD   [address] [phone]  [place], MD Tiffany Rodriguez [phone]  Ada Blue, MD Estella Ascencio, MD Anamika Han, MD   [facility] [phone]  Mariposa Strong, MD Liu Murdock MD Janeen Ochs, MD Marchelle Gory, MD Mardee Canter, NP  Chioma Cruz MD [address]  66   [phone]  MD Sunny Iyer MD Aileen Patches, MD   2102 [facility] [phone]  Henna Gonzales, MD Barry Oates, FNP  Orin Turcios, JAMEEL Turcios, FNP  Leonor Duet, PA-C Larri Mortimer, MD Micheline Bob, LYLE Valdes, DO             Miscellaneous:  Cicero Hodgkins, MD [facility] Departments     For adult and child immunizations, family planning, TB screening, STD testing and women's health services. [address]: [place] [phone]      [facility] 25   657 [facility]   [address]   170 [facility] 200 [facility] [phone]      [facility]          [address] [place] [address]     For primary care services, woman and child wellness, and some clinics providing specialty care. [facility]. [address] [place] [address] [phone]   411 [facility] 200 [facility] [address] [place] [address] [phone]   [address] [facility] [address] [place] [address] [phone]   [address] [facility] [address] [place] [address] 5850 [facility]  [phone]   [facility]3 [place] [address] [address] [phone]   [facility] 81 [facility] [phone]   [facility] [address] [place] [address] [phone]   [facility] huan Richter [address] [place] [address], [address] [phone]     [address] [phone]   [facility] [address]  Community  [phone]   [employer]  [address] ([email or web address]) [phone]         Sexual Health/Woman Wellness Clinics    For STD/HIV testing and treatment, pregnancy testing and services, men's health, birth control services, LGBT services, and hepatitis/HPV vaccine services. [facility] [employer] [place] [employer] [address] [facility] 75 [facility] 157 600 [address] [phone]   [facility] [address] , 5th floor [phone]   Pregnancy 3928 [facility] [address] [facility] Women [address]  [facility] [phone]         [place] [address] [facility] [address] [place] [address]   [phone]   [place]   [phone]   Women, Infant and Children's Services: [address] [phone]       [address] [place] [address]   [phone]   [address]   [facility]     [phone]   [address] Crisis   1212 [facility] [phone]

## 2019-09-06 LAB
BACTERIA SPEC CULT: ABNORMAL
BACTERIA SPEC CULT: ABNORMAL
CC UR VC: ABNORMAL
SERVICE CMNT-IMP: ABNORMAL

## 2019-09-06 RX ORDER — NITROFURANTOIN 25; 75 MG/1; MG/1
100 CAPSULE ORAL 2 TIMES DAILY
Qty: 10 CAP | Refills: 0 | Status: SHIPPED | OUTPATIENT
Start: 2019-09-06 | End: 2019-09-11

## 2019-09-06 NOTE — PROGRESS NOTES
Call patient. Verify demographics. Informed of urine and urine culture result. Rx for Macrobid sent to patient's pharmacy of choice.

## 2020-07-26 ENCOUNTER — HOSPITAL ENCOUNTER (EMERGENCY)
Age: 25
Discharge: HOME | End: 2020-07-26
Attending: EMERGENCY MEDICINE
Payer: MEDICAID

## 2020-07-26 VITALS
DIASTOLIC BLOOD PRESSURE: 61 MMHG | BODY MASS INDEX: 25.71 KG/M2 | WEIGHT: 130.95 LBS | TEMPERATURE: 98.7 F | RESPIRATION RATE: 18 BRPM | HEIGHT: 60 IN | OXYGEN SATURATION: 99 % | SYSTOLIC BLOOD PRESSURE: 118 MMHG | HEART RATE: 95 BPM

## 2020-07-26 DIAGNOSIS — N93.9 ABNORMAL UTERINE BLEEDING (AUB): Primary | ICD-10-CM

## 2020-07-26 LAB
ALBUMIN SERPL-MCNC: 4 G/DL (ref 3.5–5)
ALBUMIN/GLOB SERPL: 1 {RATIO} (ref 1.1–2.2)
ALP SERPL-CCNC: 80 U/L (ref 45–117)
ALT SERPL-CCNC: 27 U/L (ref 12–78)
ANION GAP SERPL CALC-SCNC: 5 MMOL/L (ref 5–15)
APPEARANCE UR: ABNORMAL
APTT PPP: 26 SEC (ref 22.1–32)
AST SERPL-CCNC: 23 U/L (ref 15–37)
BACTERIA URNS QL MICRO: ABNORMAL /HPF
BASOPHILS # BLD: 0 K/UL (ref 0–0.1)
BASOPHILS NFR BLD: 0 % (ref 0–1)
BILIRUB SERPL-MCNC: 1.1 MG/DL (ref 0.2–1)
BILIRUB UR QL: NEGATIVE
BUN SERPL-MCNC: 14 MG/DL (ref 6–20)
BUN/CREAT SERPL: 18 (ref 12–20)
CALCIUM SERPL-MCNC: 9 MG/DL (ref 8.5–10.1)
CHLORIDE SERPL-SCNC: 104 MMOL/L (ref 97–108)
CLUE CELLS VAG QL WET PREP: NORMAL
CO2 SERPL-SCNC: 29 MMOL/L (ref 21–32)
COLOR UR: ABNORMAL
CREAT SERPL-MCNC: 0.8 MG/DL (ref 0.55–1.02)
DIFFERENTIAL METHOD BLD: NORMAL
EOSINOPHIL # BLD: 0.1 K/UL (ref 0–0.4)
EOSINOPHIL NFR BLD: 1 % (ref 0–7)
EPITH CASTS URNS QL MICRO: ABNORMAL /LPF
ERYTHROCYTE [DISTWIDTH] IN BLOOD BY AUTOMATED COUNT: 11.8 % (ref 11.5–14.5)
GLOBULIN SER CALC-MCNC: 4 G/DL (ref 2–4)
GLUCOSE SERPL-MCNC: 91 MG/DL (ref 65–100)
GLUCOSE UR STRIP.AUTO-MCNC: NEGATIVE MG/DL
HCG UR QL: NEGATIVE
HCT VFR BLD AUTO: 43.4 % (ref 35–47)
HGB BLD-MCNC: 14.4 G/DL (ref 11.5–16)
HGB UR QL STRIP: ABNORMAL
IMM GRANULOCYTES # BLD AUTO: 0 K/UL (ref 0–0.04)
IMM GRANULOCYTES NFR BLD AUTO: 0 % (ref 0–0.5)
INR PPP: 1 (ref 0.9–1.1)
KETONES UR QL STRIP.AUTO: ABNORMAL MG/DL
KOH PREP SPEC: NORMAL
LEUKOCYTE ESTERASE UR QL STRIP.AUTO: ABNORMAL
LYMPHOCYTES # BLD: 1.8 K/UL (ref 0.8–3.5)
LYMPHOCYTES NFR BLD: 20 % (ref 12–49)
MCH RBC QN AUTO: 31.9 PG (ref 26–34)
MCHC RBC AUTO-ENTMCNC: 33.2 G/DL (ref 30–36.5)
MCV RBC AUTO: 96.2 FL (ref 80–99)
MONOCYTES # BLD: 0.5 K/UL (ref 0–1)
MONOCYTES NFR BLD: 6 % (ref 5–13)
MUCOUS THREADS URNS QL MICRO: ABNORMAL /LPF
NEUTS SEG # BLD: 6.9 K/UL (ref 1.8–8)
NEUTS SEG NFR BLD: 73 % (ref 32–75)
NITRITE UR QL STRIP.AUTO: NEGATIVE
NRBC # BLD: 0 K/UL (ref 0–0.01)
NRBC BLD-RTO: 0 PER 100 WBC
PH UR STRIP: 7.5 [PH] (ref 5–8)
PLATELET # BLD AUTO: 285 K/UL (ref 150–400)
PMV BLD AUTO: 11.3 FL (ref 8.9–12.9)
POTASSIUM SERPL-SCNC: 4.4 MMOL/L (ref 3.5–5.1)
PROT SERPL-MCNC: 8 G/DL (ref 6.4–8.2)
PROT UR STRIP-MCNC: 30 MG/DL
PROTHROMBIN TIME: 10.1 SEC (ref 9–11.1)
RBC # BLD AUTO: 4.51 M/UL (ref 3.8–5.2)
RBC #/AREA URNS HPF: >100 /HPF (ref 0–5)
SERVICE CMNT-IMP: NORMAL
SODIUM SERPL-SCNC: 138 MMOL/L (ref 136–145)
SP GR UR REFRACTOMETRY: 1.03 (ref 1–1.03)
T VAGINALIS VAG QL WET PREP: NORMAL
THERAPEUTIC RANGE,PTTT: NORMAL SECS (ref 58–77)
UA: UC IF INDICATED,UAUC: ABNORMAL
UROBILINOGEN UR QL STRIP.AUTO: 0.2 EU/DL (ref 0.2–1)
WBC # BLD AUTO: 9.3 K/UL (ref 3.6–11)
WBC URNS QL MICRO: ABNORMAL /HPF (ref 0–4)

## 2020-07-26 PROCEDURE — 85025 COMPLETE CBC W/AUTO DIFF WBC: CPT

## 2020-07-26 PROCEDURE — 87210 SMEAR WET MOUNT SALINE/INK: CPT

## 2020-07-26 PROCEDURE — 87491 CHLMYD TRACH DNA AMP PROBE: CPT

## 2020-07-26 PROCEDURE — 80053 COMPREHEN METABOLIC PANEL: CPT

## 2020-07-26 PROCEDURE — 81025 URINE PREGNANCY TEST: CPT

## 2020-07-26 PROCEDURE — 36415 COLL VENOUS BLD VENIPUNCTURE: CPT

## 2020-07-26 PROCEDURE — 99283 EMERGENCY DEPT VISIT LOW MDM: CPT

## 2020-07-26 PROCEDURE — 96374 THER/PROPH/DIAG INJ IV PUSH: CPT

## 2020-07-26 PROCEDURE — 74011250636 HC RX REV CODE- 250/636: Performed by: STUDENT IN AN ORGANIZED HEALTH CARE EDUCATION/TRAINING PROGRAM

## 2020-07-26 PROCEDURE — 81001 URINALYSIS AUTO W/SCOPE: CPT

## 2020-07-26 PROCEDURE — 74011250637 HC RX REV CODE- 250/637: Performed by: STUDENT IN AN ORGANIZED HEALTH CARE EDUCATION/TRAINING PROGRAM

## 2020-07-26 PROCEDURE — 85610 PROTHROMBIN TIME: CPT

## 2020-07-26 PROCEDURE — 85730 THROMBOPLASTIN TIME PARTIAL: CPT

## 2020-07-26 RX ORDER — ONDANSETRON 2 MG/ML
4 INJECTION INTRAMUSCULAR; INTRAVENOUS
Status: COMPLETED | OUTPATIENT
Start: 2020-07-26 | End: 2020-07-26

## 2020-07-26 RX ORDER — ACETAMINOPHEN 325 MG/1
975 TABLET ORAL
Status: COMPLETED | OUTPATIENT
Start: 2020-07-26 | End: 2020-07-26

## 2020-07-26 RX ADMIN — ACETAMINOPHEN 975 MG: 325 TABLET ORAL at 13:32

## 2020-07-26 RX ADMIN — ONDANSETRON 4 MG: 2 INJECTION INTRAMUSCULAR; INTRAVENOUS at 13:38

## 2020-07-26 NOTE — ED PROVIDER NOTES
EMERGENCY DEPARTMENT HISTORY AND PHYSICAL EXAM          Date: 7/26/2020  Patient Name: Abner Cook  Attending of Record: Denisha Abrams    History of Presenting Illness     Chief Complaint   Patient presents with    Vaginal Bleeding     pt reports soaking vaginal bleeding since this morning, 1 tampon per 30 minutes    Back Pain     pt reports back pain since start of bleeding this morning    Pelvic Pain     pt reports tearing during intercourse two weeks ago, uncertain if blood is from tear or from from menstrual cycle       History Provided By: Patient    HPI: Abner Cook is a 22 y.o. female, pmhx anemia, Crohn's, and PCOS presenting with complaint of heavy vaginal bleeding. States that she noted moderate vaginal bleeding yesterday and started using tampons. She woke up at 4 AM needing to change her tampon after only 4 hours this morning, and then again at 6 AM and then woke up at 8 AM with bloodsoaked underwear and bedding. She noted significant continued bleeding over to the bathroom. She states that she has had heavy periods in the past but they have never been this heavy. She is currently in the middle of her cycle and did not expect any bleeding at this time. She does note that 2 weeks ago she had a few days of bleeding after intercourse that she thought might be due to a small tear but she has been 10 days without bleeding since then. She often has irregular periods, she attributes this to her PCOS. She had an IUD taken out in March and restarted OCPs then. She has had intermittent bleeding midcycle since then but nothing this heavy. She denies any associated chest pain, dyspnea, fatigue, confusion, syncope. PCP: None    There are no other complaints, changes, or physical findings at this time. Current Outpatient Medications   Medication Sig Dispense Refill    mesalamine (LIALDA) 1.2 gram delayed release tablet Take 2 Tabs by mouth Daily (before breakfast).  40 Tab 3    traMADol (ULTRAM) 50 mg tablet Take 1 Tab by mouth every eight (8) hours as needed for Pain. Max Daily Amount: 150 mg. 12 Tab 0    predniSONE (STERAPRED) 5 mg dose pack Take 4 tabs for two days, three tabs for two days, two tabs for two days and one tab for two days. 20 Tab 0    oxyCODONE-acetaminophen (PERCOCET) 5-325 mg per tablet Take 1 Tab by mouth every four (4) hours as needed for Pain. Max Daily Amount: 6 Tabs. 20 Tab 0    pediatric multivitamin no.49 (FLINTSTONES GUMMIES) chew Take 2 Tabs by mouth daily. Indications: VITAMIN DEFICIENCY PREVENTION      PNV No12-Iron-FA-DSS-OM-3 29 mg iron-1 mg -50 mg CPKD Take 1 Tab by mouth daily. Indications: Pregnancy      pantoprazole (PROTONIX) 40 mg tablet Take 1 Tab by mouth daily.  36 Tab 2       Past History     Past Medical History:  Past Medical History:   Diagnosis Date    Anemia     receiving iron transfusions- last one 10/20/2017    Crohn's colitis ([facility] [address]     H/O  section     Herpes simplex virus (HSV) infection     just oral cold sores    Infertility, female     due to PCOS and chron's    PCOS (polycystic ovarian syndrome)     Placental abruption     Polycystic disease, ovaries     Rhesus isoimmunization affecting pregnancy        Past Surgical History:  Past Surgical History:   Procedure Laterality Date     DELIVERY ONLY  2016     due to placental abruption;  demise    HX  SECTION      x 1       Family History:  Family History   Problem Relation Age of Onset    Cancer Mother 32        Cervical    Migraines Mother     Psychiatric Disorder Mother     Cancer Maternal Grandmother         Cervical and ovarian    Hypertension Maternal Grandmother     Psychiatric Disorder Maternal Grandmother     Diabetes Maternal Grandfather     Hypertension Maternal Grandfather     Heart Disease Maternal Grandfather     Cancer Maternal Grandfather         Thyroid        Social History:  Social History     Tobacco Use    Smoking status: Current Some Day Smoker     Packs/day: 0.50     Last attempt to quit: 5/1/2017     Years since quitting: 3.2    Smokeless tobacco: Never Used   Substance Use Topics    Alcohol use: Yes     Comment: socially    Drug use: No       Allergies: Allergies   Allergen Reactions    Codeine Anaphylaxis     Tolerates oxycodone    Nsaids (Non-Steroidal Anti-Inflammatory Drug) Other (comments)     Hx crohns disease          Review of Systems   Review of Systems   CONSTITUTIONAL: Patient denies fevers, chills, sweats and weight changes. EYES: Patient denies any visual symptoms. EARS, NOSE, AND THROAT: No difficulties with hearing. No symptoms of rhinitis or sore throat. CARDIOVASCULAR: Patient denies chest pains, palpitations  RESPIRATORY: No dyspnea on exertion, no wheezing or cough. GI: No nausea, vomiting, diarrhea, constipation, abdominal pain, hematochezia or melena. : No dysuria   MUSCULOSKELETAL: No myalgias or arthralgias. NEUROLOGIC: No chronic headaches, no seizures. Patient denies numbness, tingling or weakness. PSYCHIATRIC: Patient denies problems with mood disturbance. No problems with anxiety. DERMATOLOGIC: Patient denies any rashes or skin changes. Physical Exam   Physical Exam   GENERAL APPEARANCE: No acute distress   HEENT: Normocephalic and atraumatic. No scleral icterus. PERRL. No conjunctival injection. Oropharynx is clear. Moist mucus membranes   NECK: Supple. Trachea is midline. CHEST: Symmetric. Nontender to palpation. LUNGS: Breath sounds are equal and clear bilaterally. No wheezes, rhonchi, or rales. HEART: Regular rate and rhythm with normal S1 and S2. No murmurs, gallops, or rubs. ABDOMEN: Soft, flat, and benign. No mass, tenderness, guarding, or rebound. EXTREMITIES: No cyanosis, clubbing, or edema. PELVIC: Normal appearing cervix with slow cervical bleeding. No discharge or odor   NEUROLOGIC: No focal sensory or motor deficits are noted.  Cranial nerves II through XII are intact. PSYCHIATRIC: The patient is awake, alert, and oriented x3. Recent and remote memory is intact. Appropriate mood and affect. SKIN: Warm, dry, and well perfused. Good turgor. No lesions, nodules or rashes are noted on exposed skin. Diagnostic Study Results     Labs -     Recent Results (from the past 12 hour(s))   URINALYSIS W/ REFLEX CULTURE    Collection Time: 07/26/20 11:47 AM    Specimen: Urine   Result Value Ref Range    Color ORANGE      Appearance CLOUDY (A) CLEAR      Specific gravity 1.026 1.003 - 1.030      pH (UA) 7.5 5.0 - 8.0      Protein 30 (A) NEG mg/dL    Glucose Negative NEG mg/dL    Ketone TRACE (A) NEG mg/dL    Bilirubin Negative NEG      Blood LARGE (A) NEG      Urobilinogen 0.2 0.2 - 1.0 EU/dL    Nitrites Negative NEG      Leukocyte Esterase SMALL (A) NEG      WBC 0-4 0 - 4 /hpf    RBC >100 (H) 0 - 5 /hpf    Epithelial cells FEW FEW /lpf    Bacteria 2+ (A) NEG /hpf    UA:UC IF INDICATED CULTURE NOT INDICATED BY UA RESULT CNI      Mucus TRACE (A) NEG /lpf   GLEN, OTHER SOURCES    Collection Time: 07/26/20 11:47 AM    Specimen: Cervix;  Other   Result Value Ref Range    Special Requests: NO SPECIAL REQUESTS      KOH NO YEAST SEEN     WET PREP    Collection Time: 07/26/20 11:47 AM    Specimen: Miscellaneous sample   Result Value Ref Range    Clue cells CLUE CELLS ABSENT      Wet prep NO TRICHOMONAS SEEN     CBC WITH AUTOMATED DIFF    Collection Time: 07/26/20  1:37 PM   Result Value Ref Range    WBC 9.3 3.6 - 11.0 K/uL    RBC 4.51 3.80 - 5.20 M/uL    HGB 14.4 11.5 - 16.0 g/dL    HCT 43.4 35.0 - 47.0 %    MCV 96.2 80.0 - 99.0 FL    MCH 31.9 26.0 - 34.0 PG    MCHC 33.2 30.0 - 36.5 g/dL    RDW 11.8 11.5 - 14.5 %    PLATELET 701 188 - 464 K/uL    MPV 11.3 8.9 - 12.9 FL    NRBC 0.0 0  WBC    ABSOLUTE NRBC 0.00 0.00 - 0.01 K/uL    NEUTROPHILS 73 32 - 75 %    LYMPHOCYTES 20 12 - 49 %    MONOCYTES 6 5 - 13 %    EOSINOPHILS 1 0 - 7 %    BASOPHILS 0 0 - 1 % IMMATURE GRANULOCYTES 0 0.0 - 0.5 %    ABS. NEUTROPHILS 6.9 1.8 - 8.0 K/UL    ABS. LYMPHOCYTES 1.8 0.8 - 3.5 K/UL    ABS. MONOCYTES 0.5 0.0 - 1.0 K/UL    ABS. EOSINOPHILS 0.1 0.0 - 0.4 K/UL    ABS. BASOPHILS 0.0 0.0 - 0.1 K/UL    ABS. IMM. GRANS. 0.0 0.00 - 0.04 K/UL    DF AUTOMATED     METABOLIC PANEL, COMPREHENSIVE    Collection Time: 07/26/20  1:37 PM   Result Value Ref Range    Sodium 138 136 - 145 mmol/L    Potassium 4.4 3.5 - 5.1 mmol/L    Chloride 104 97 - 108 mmol/L    CO2 29 21 - 32 mmol/L    Anion gap 5 5 - 15 mmol/L    Glucose 91 65 - 100 mg/dL    BUN 14 6 - 20 MG/DL    Creatinine 0.80 0.55 - 1.02 MG/DL    BUN/Creatinine ratio 18 12 - 20      GFR est AA >60 >60 ml/min/1.73m2    GFR est non-AA >60 >60 ml/min/1.73m2    Calcium 9.0 8.5 - 10.1 MG/DL    Bilirubin, total 1.1 (H) 0.2 - 1.0 MG/DL    ALT (SGPT) 27 12 - 78 U/L    AST (SGOT) 23 15 - 37 U/L    Alk. phosphatase 80 45 - 117 U/L    Protein, total 8.0 6.4 - 8.2 g/dL    Albumin 4.0 3.5 - 5.0 g/dL    Globulin 4.0 2.0 - 4.0 g/dL    A-G Ratio 1.0 (L) 1.1 - 2.2     PTT    Collection Time: 07/26/20  1:37 PM   Result Value Ref Range    aPTT 26.0 22.1 - 32.0 sec    aPTT, therapeutic range     58.0 - 77.0 SECS   PROTHROMBIN TIME + INR    Collection Time: 07/26/20  1:37 PM   Result Value Ref Range    INR 1.0 0.9 - 1.1      Prothrombin time 10.1 9.0 - 11.1 sec   HCG URINE, QL. - POC    Collection Time: 07/26/20  1:49 PM   Result Value Ref Range    Pregnancy test,urine (POC) Negative NEG         Radiologic Studies -   No orders to display     CT Results  (Last 48 hours)    None        CXR Results  (Last 48 hours)    None            Medical Decision Making   I am the first provider for this patient. I reviewed the vital signs, available nursing notes, past medical history, past surgical history, family history and social history. Vital Signs-Reviewed the patient's vital signs.   Patient Vitals for the past 12 hrs:   Temp Pulse Resp BP SpO2   07/26/20 1131 98.7 °F (37.1 °C) 95 18 119/78 100 %       Records Reviewed: Nursing Notes and Old Medical Records    Provider Notes (Medical Decision Making):   DDx: Abnormal uterine bleeding, pregnancy complication, UTI, STI, vaginal trauma    11year-old female presenting with complaint of vaginal bleeding. Physical exam notable for slow bleeding from the cervix otherwise normal pelvic exam.  Symptoms consistent with abnormal uterine bleeding and patient with known history of PCOS. Evaluating for anemia CBC, STIs with STI panel. Will request that the patient follow-up with her OB/GYN. ED Course and Progress Notes:   Initial assessment performed. The patients presenting problems have been discussed, and they are in agreement with the care plan formulated and outlined with them. I have encouraged them to ask questions as they arise throughout their visit. Diagnosis     Clinical Impression:   1. Abnormal uterine bleeding (AUB)        Disposition:  Discharge    DISCHARGE PLAN:    1. Current Discharge Medication List        2. Follow-up Information     Follow up With Specialties Details Why Contact Info    Nancy Keller DO Obstetrics & Gynecology, Gynecology, Obstetrics Schedule an appointment as soon as possible for a visit in 2 days  [address] [place] [address] [address] [place] [address]  [phone]          3.  Return to ED if worse         Resident Signature: Meek David MD

## 2020-07-26 NOTE — ED NOTES
Margarita CUETO reviewed discharge instructions with the patient. The patient verbalized understanding. All questions and concerns were addressed. The patient declined a wheelchair and is discharged ambulatory in the care of family members with instructions and prescriptions in hand. Pt is alert and oriented x 4. Respirations are clear and unlabored.

## 2020-09-06 ENCOUNTER — HOSPITAL ENCOUNTER (EMERGENCY)
Age: 25
Discharge: HOME | End: 2020-09-06
Attending: EMERGENCY MEDICINE
Payer: MEDICAID

## 2020-09-06 VITALS
HEIGHT: 60 IN | RESPIRATION RATE: 13 BRPM | DIASTOLIC BLOOD PRESSURE: 95 MMHG | HEART RATE: 107 BPM | BODY MASS INDEX: 25.88 KG/M2 | SYSTOLIC BLOOD PRESSURE: 136 MMHG | WEIGHT: 131.84 LBS | OXYGEN SATURATION: 100 % | TEMPERATURE: 99.1 F

## 2020-09-06 DIAGNOSIS — Z34.90 PREGNANCY AT EARLY STAGE: Primary | ICD-10-CM

## 2020-09-06 LAB
HCG SERPL-ACNC: 16 MIU/ML (ref 0–6)
HCG UR QL: NEGATIVE

## 2020-09-06 PROCEDURE — 81025 URINE PREGNANCY TEST: CPT

## 2020-09-06 PROCEDURE — 36415 COLL VENOUS BLD VENIPUNCTURE: CPT

## 2020-09-06 PROCEDURE — 84702 CHORIONIC GONADOTROPIN TEST: CPT

## 2020-09-06 PROCEDURE — 99284 EMERGENCY DEPT VISIT MOD MDM: CPT

## 2020-09-06 NOTE — ED PROVIDER NOTES
EMERGENCY DEPARTMENT HISTORY AND PHYSICAL EXAM      Date: 2020  Patient Name: Jesús Ramirez    History of Presenting Illness     Chief Complaint   Patient presents with    Other     LMP was , unexpected positive UPT today. Reports major complications with her last 2 pregnancies.  - she experienced a placental abruption at 34 weeks and her son passed away 1 hr after birth. Second prenancy in 2017-uterine rupture at 36 weeks during a scheduled ; her son is alive and well. Here today concerned for her unexpected/high risk pregnancy. No distress noted at this time. History Provided By: Patient    HPI: Jesús Ramirez, 22 y.o. female with significant PMHx for prior high risk pregnancies, presents by POV to the ED concerns of potential pregnancy. She reports that her LNMP was 20. She was having regular intercourse with her boyfriend between that time and 20. Then she had intercourse with an ex boyfriend on 20. She became concerned that she may be pregnant since she was late on her cycle and did a home pregnancy test this evening that was positive. She is very concerned as her prior 2 pregnancies have had complications. In her first pregnancy in  she experienced a placental abruption at 34 weeks. She had a live birth but unfortunately her son passed away 1 hour after delivery. Her second pregnancy in 2017 she was scheduled for a  at 39 weeks but during the  she had a uterine rupture. She is concerned today due to these prior pregnancies. She currently denies any pain. There is been no vaginal bleeding, or discharge. She has no urinary complaints. Her OB is Dr. Mishel Phillips at [facility]. There are no other complaints, changes, or physical findings at this time. Social Hx: Tobacco (former smoker), EtOH (denies), Illicit drug use (denies)     PCP: None    No current facility-administered medications on file prior to encounter. Current Outpatient Medications on File Prior to Encounter   Medication Sig Dispense Refill    [DISCONTINUED] traMADol (ULTRAM) 50 mg tablet Take 1 Tab by mouth every eight (8) hours as needed for Pain. Max Daily Amount: 150 mg. 12 Tab 0    [DISCONTINUED] predniSONE (STERAPRED) 5 mg dose pack Take 4 tabs for two days, three tabs for two days, two tabs for two days and one tab for two days. 20 Tab 0    [DISCONTINUED] oxyCODONE-acetaminophen (PERCOCET) 5-325 mg per tablet Take 1 Tab by mouth every four (4) hours as needed for Pain. Max Daily Amount: 6 Tabs. 20 Tab 0    pediatric multivitamin no.49 (FLINTSTONES GUMMIES) chew Take 2 Tabs by mouth daily. Indications: VITAMIN DEFICIENCY PREVENTION      PNV No12-Iron-FA-DSS-OM-3 29 mg iron-1 mg -50 mg CPKD Take 1 Tab by mouth daily. Indications: Pregnancy      pantoprazole (PROTONIX) 40 mg tablet Take 1 Tab by mouth daily. 40 Tab 2    mesalamine (LIALDA) 1.2 gram delayed release tablet Take 2 Tabs by mouth Daily (before breakfast).  36 Tab 3       Past History     Past Medical History:  Past Medical History:   Diagnosis Date    Anemia     receiving iron transfusions- last one 10/20/2017    Crohn's colitis ([facility] [address]     H/O  section     Herpes simplex virus (HSV) infection     just oral cold sores    Infertility, female     due to PCOS and chron's    PCOS (polycystic ovarian syndrome)     Placental abruption     Polycystic disease, ovaries     Rhesus isoimmunization affecting pregnancy        Past Surgical History:  Past Surgical History:   Procedure Laterality Date     DELIVERY ONLY  2016     due to placental abruption;  demise    HX  SECTION      x 1       Family History:  Family History   Problem Relation Age of Onset    Cancer Mother 32        Cervical    Migraines Mother     Psychiatric Disorder Mother     Cancer Maternal Grandmother         Cervical and ovarian    Hypertension Maternal Grandmother  Psychiatric Disorder Maternal Grandmother     Diabetes Maternal Grandfather     Hypertension Maternal Grandfather     Heart Disease Maternal Grandfather     Cancer Maternal Grandfather         Thyroid        Social History:  Social History     Tobacco Use    Smoking status: Current Some Day Smoker     Packs/day: 0.50     Last attempt to quit: 5/1/2017     Years since quitting: 3.3    Smokeless tobacco: Never Used   Substance Use Topics    Alcohol use: Yes     Comment: socially    Drug use: No       Allergies: Allergies   Allergen Reactions    Codeine Anaphylaxis     Tolerates oxycodone    Nsaids (Non-Steroidal Anti-Inflammatory Drug) Other (comments)     Hx crohns disease          Review of Systems   Review of Systems   Constitutional: Negative for chills, diaphoresis and fever. HENT: Negative for congestion, ear pain, rhinorrhea and sore throat. Respiratory: Negative for cough and shortness of breath. Cardiovascular: Negative for chest pain. Gastrointestinal: Negative for abdominal pain, constipation, diarrhea, nausea and vomiting. Genitourinary: Negative for difficulty urinating, dysuria, frequency and hematuria. Musculoskeletal: Negative for arthralgias and myalgias. Neurological: Negative for headaches. All other systems reviewed and are negative. Physical Exam   Physical Exam  Vitals signs and nursing note reviewed. Constitutional:       General: She is not in acute distress. Appearance: She is well-developed. She is not diaphoretic. Comments: 22 y.o.  female    HENT:      Head: Normocephalic and atraumatic. Eyes:      General:         Right eye: No discharge. Left eye: No discharge. Conjunctiva/sclera: Conjunctivae normal.   Neck:      Musculoskeletal: Normal range of motion and neck supple. Cardiovascular:      Rate and Rhythm: Normal rate and regular rhythm. Heart sounds: Normal heart sounds. No murmur.    Pulmonary:      Effort: Pulmonary effort is normal. No respiratory distress. Breath sounds: Normal breath sounds. Abdominal:      General: Abdomen is flat. There is no distension. Tenderness: There is no right CVA tenderness, left CVA tenderness, guarding or rebound. Skin:     General: Skin is warm and dry. Neurological:      Mental Status: She is alert and oriented to person, place, and time. Psychiatric:         Mood and Affect: Mood is anxious. Behavior: Behavior normal.         Diagnostic Study Results     Labs -     Recent Results (from the past 12 hour(s))   HCG URINE, QL. - POC    Collection Time: 09/06/20  7:29 PM   Result Value Ref Range    Pregnancy test,urine (POC) Negative NEG     BETA HCG, QT    Collection Time: 09/06/20  7:50 PM   Result Value Ref Range    Beta HCG, QT 16 (H) 0 - 6 MIU/ML       Radiologic Studies -  none    Medical Decision Making   I am the first provider for this patient. I reviewed the vital signs, available nursing notes, past medical history, past surgical history, family history and social history. Vital Signs-Reviewed the patient's vital signs. Patient Vitals for the past 12 hrs:   Temp Pulse Resp BP SpO2   09/06/20 2032     100 %   09/06/20 1914     99 %   09/06/20 1900    (!) 136/95 99 %   09/06/20 1837 99.1 °F (37.3 °C) (!) 107 13 127/82 99 %       Records Reviewed: Nursing Notes    Provider Notes (Medical Decision Making):   Patient presents to the ED without pain or complaint. She is concerned about potentially being pregnant. DDx include pregnancy and or potential complications such as ectopic or miscarriage. UPT was negative. Quant HCG was only 16. US not ordered since quant was so low. Recommended that  patient follow up with her OB in 2-3 days to have a repeat quant. ED Course:   Initial assessment performed. The patients presenting problems have been discussed, and they are in agreement with the care plan formulated and outlined with them.   I have encouraged them to ask questions as they arise throughout their visit. Critical Care Time: None    Disposition:  DISCHARGE NOTE:  8:46 PM  The pt is ready for discharge. The pt's signs, symptoms, diagnosis, and discharge instructions have been discussed and pt has conveyed their understanding. The pt is to follow up as recommended or return to ER should their symptoms worsen. Plan has been discussed and pt is in agreement. PLAN:  1. Current Discharge Medication List      CONTINUE these medications which have NOT CHANGED    Details   pediatric multivitamin no.49 (FLINTSTONES GUMMIES) chew Take 2 Tabs by mouth daily. Indications: VITAMIN DEFICIENCY PREVENTION      PNV No12-Iron-FA-DSS-OM-3 29 mg iron-1 mg -50 mg CPKD Take 1 Tab by mouth daily. Indications: Pregnancy      pantoprazole (PROTONIX) 40 mg tablet Take 1 Tab by mouth daily. Qty: 40 Tab, Refills: 2      mesalamine (LIALDA) 1.2 gram delayed release tablet Take 2 Tabs by mouth Daily (before breakfast). Qty: 40 Tab, Refills: 3           2. Follow-up Information     Follow up With Specialties Details Why Lashae  In 3 days  [address] [place] [address]        Return to ED if worse     Diagnosis     Clinical Impression:   1. Pregnancy at early stage          Please note that this dictation was completed with [employer], the computer voice recognition software. Quite often unanticipated grammatical, syntax, homophones, and other interpretive errors are inadvertently transcribed by the computer software. Please disregards these errors. Please excuse any errors that have escaped final proofreading. This note will not be viewable in [address]

## 2020-09-07 NOTE — ED NOTES
Margarita CUETO and Lina LANGE reviewed discharge instructions with the patient. The patient verbalized understanding. All questions and concerns were addressed. The patient declined a wheelchair and is discharged ambulatory in the care of family members with instructions in hand. Pt is alert and oriented x 4. Respirations are clear and unlabored.

## 2020-09-07 NOTE — DISCHARGE INSTRUCTIONS
Patient Education        Learning About Pregnancy  Your Care Instructions     Your health in the early weeks of your pregnancy is particularly important for your baby's health. Take good care of yourself. Anything you do that harms your body can also harm your baby. Make sure to go to all of your doctor appointments. Regular checkups will help keep you and your baby healthy. How can you care for yourself at home? Diet    · Eat a balanced diet. Make sure your diet includes plenty of beans, peas, and leafy green vegetables.     · Do not skip meals or go for many hours without eating. If you are nauseated, try to eat a small, healthy snack every 2 to 3 hours.     · Do not eat fish that has a high level of mercury, such as shark, swordfish, or mackerel. Do not eat more than one can of tuna each week.     · Drink plenty of fluids, enough so that your urine is light yellow or clear like water. If you have kidney, heart, or liver disease and have to limit fluids, talk with your doctor before you increase the amount of fluids you drink.     · Cut down on caffeine, such as coffee, tea, and cola.     · Do not drink alcohol, such as beer, wine, or hard liquor.     · Take a multivitamin that contains at least 400 micrograms (mcg) of folic acid to help prevent birth defects. Fortified cereal and whole wheat bread are good additional sources of folic acid.     · Increase the calcium in your diet. Try to drink a quart of skim milk each day. You may also take calcium supplements and choose foods such as cheese and yogurt. Lifestyle    · Make sure you go to your follow-up appointments.     · Get plenty of rest. You may be unusually tired while you are pregnant.     · Get at least 30 minutes of exercise on most days of the week. Walking is a good choice. If you have not exercised in the past, start out slowly. Take several short walks each day.     · Do not smoke.  If you need help quitting, talk to your doctor about stop-smoking programs. These can increase your chances of quitting for good.     · Do not touch cat feces or litter boxes. Also, wash your hands after you handle raw meat, and fully cook all meat before you eat it. Wear gloves when you work in the yard or garden, and wash your hands well when you are done. Cat feces, raw or undercooked meat, and contaminated dirt can cause an infection that may harm your baby or lead to a miscarriage.     · Do not use saunas or hot tubs. Raising your body temperature may harm your baby.     · Avoid chemical fumes, paint fumes, or poisons.     · Do not use illegal drugs or alcohol. Medicines    · Review all of your medicines with your doctor. Some of your routine medicines may need to be changed to protect your baby.     · Use acetaminophen (Tylenol) to relieve minor problems, such as a mild headache or backache or a mild fever with cold symptoms. Do not use nonsteroidal anti-inflammatory drugs (NSAIDs), such as ibuprofen (Advil, Motrin) or naproxen (Aleve), unless your doctor says it is okay.     · Do not take two or more pain medicines at the same time unless the doctor told you to. Many pain medicines have acetaminophen, which is Tylenol. Too much acetaminophen (Tylenol) can be harmful.     · Take your medicines exactly as prescribed. Call your doctor if you think you are having a problem with your medicine. To manage morning sickness    · If you feel sick when you first wake up, try eating a small snack (such as crackers) before you get out of bed. Allow some time to digest the snack, and then get out of bed slowly.     · Do not skip meals or go for long periods without eating. An empty stomach can make nausea worse.     · Eat small, frequent meals instead of three large meals each day.     · Drink plenty of fluids.  Sports drinks, such as Gatorade or Powerade, are good choices.     · Eat foods that are high in protein but low in fat.     · If you are taking iron supplements, ask your doctor if they are necessary. Iron can make nausea worse.     · Avoid any smells, such as coffee, that make you feel sick.     · Get lots of rest. Morning sickness may be worse when you are tired. Follow-up care is a key part of your treatment and safety. Be sure to make and go to all appointments, and call your doctor if you are having problems. It's also a good idea to know your test results and keep a list of the medicines you take. Where can you learn more? Go to http://giselle-tara.info/  Enter P545 in the search box to learn more about \"Learning About Pregnancy. \"  Current as of: February 11, 2020               Content Version: 12.6  © 2129-7160 [employer]. Care instructions adapted under license by [employer] (which disclaims liability or warranty for this information). If you have questions about a medical condition or this instruction, always ask your healthcare professional. [address] any warranty or liability for your use of this information.

## 2021-01-25 ENCOUNTER — HOSPITAL ENCOUNTER (EMERGENCY)
Age: 26
Discharge: HOME | End: 2021-01-25
Attending: STUDENT IN AN ORGANIZED HEALTH CARE EDUCATION/TRAINING PROGRAM
Payer: MEDICAID

## 2021-01-25 ENCOUNTER — APPOINTMENT (OUTPATIENT)
Dept: GENERAL RADIOLOGY | Age: 26
End: 2021-01-25
Attending: STUDENT IN AN ORGANIZED HEALTH CARE EDUCATION/TRAINING PROGRAM
Payer: MEDICAID

## 2021-01-25 VITALS
RESPIRATION RATE: 16 BRPM | SYSTOLIC BLOOD PRESSURE: 122 MMHG | HEIGHT: 60 IN | OXYGEN SATURATION: 100 % | BODY MASS INDEX: 24.24 KG/M2 | HEART RATE: 60 BPM | DIASTOLIC BLOOD PRESSURE: 75 MMHG | TEMPERATURE: 97.9 F | WEIGHT: 123.46 LBS

## 2021-01-25 DIAGNOSIS — M79.10 MYALGIA: Primary | ICD-10-CM

## 2021-01-25 LAB
ALBUMIN SERPL-MCNC: 3.8 G/DL (ref 3.5–5)
ALBUMIN/GLOB SERPL: 1 {RATIO} (ref 1.1–2.2)
ALP SERPL-CCNC: 105 U/L (ref 45–117)
ALT SERPL-CCNC: 23 U/L (ref 12–78)
ANION GAP SERPL CALC-SCNC: 1 MMOL/L (ref 5–15)
APPEARANCE UR: CLEAR
AST SERPL-CCNC: 15 U/L (ref 15–37)
BACTERIA URNS QL MICRO: ABNORMAL /HPF
BASOPHILS # BLD: 0 K/UL (ref 0–0.1)
BASOPHILS NFR BLD: 0 % (ref 0–1)
BILIRUB SERPL-MCNC: 0.3 MG/DL (ref 0.2–1)
BILIRUB UR QL CFM: NEGATIVE
BUN SERPL-MCNC: 14 MG/DL (ref 6–20)
BUN/CREAT SERPL: 21 (ref 12–20)
CALCIUM SERPL-MCNC: 8.7 MG/DL (ref 8.5–10.1)
CHLORIDE SERPL-SCNC: 104 MMOL/L (ref 97–108)
CO2 SERPL-SCNC: 31 MMOL/L (ref 21–32)
COLOR UR: ABNORMAL
CREAT SERPL-MCNC: 0.68 MG/DL (ref 0.55–1.02)
DIFFERENTIAL METHOD BLD: NORMAL
EOSINOPHIL # BLD: 0.2 K/UL (ref 0–0.4)
EOSINOPHIL NFR BLD: 2 % (ref 0–7)
EPITH CASTS URNS QL MICRO: ABNORMAL /LPF
ERYTHROCYTE [DISTWIDTH] IN BLOOD BY AUTOMATED COUNT: 12.4 % (ref 11.5–14.5)
GLOBULIN SER CALC-MCNC: 3.9 G/DL (ref 2–4)
GLUCOSE SERPL-MCNC: 94 MG/DL (ref 65–100)
GLUCOSE UR STRIP.AUTO-MCNC: NEGATIVE MG/DL
HCG UR QL: NEGATIVE
HCT VFR BLD AUTO: 37.5 % (ref 35–47)
HGB BLD-MCNC: 11.6 G/DL (ref 11.5–16)
HGB UR QL STRIP: NEGATIVE
IMM GRANULOCYTES # BLD AUTO: 0 K/UL (ref 0–0.04)
IMM GRANULOCYTES NFR BLD AUTO: 0 % (ref 0–0.5)
KETONES UR QL STRIP.AUTO: ABNORMAL MG/DL
LEUKOCYTE ESTERASE UR QL STRIP.AUTO: NEGATIVE
LYMPHOCYTES # BLD: 1.9 K/UL (ref 0.8–3.5)
LYMPHOCYTES NFR BLD: 26 % (ref 12–49)
MAGNESIUM SERPL-MCNC: 2.1 MG/DL (ref 1.6–2.4)
MCH RBC QN AUTO: 27.6 PG (ref 26–34)
MCHC RBC AUTO-ENTMCNC: 30.9 G/DL (ref 30–36.5)
MCV RBC AUTO: 89.3 FL (ref 80–99)
MONOCYTES # BLD: 0.4 K/UL (ref 0–1)
MONOCYTES NFR BLD: 6 % (ref 5–13)
MUCOUS THREADS URNS QL MICRO: ABNORMAL /LPF
NEUTS SEG # BLD: 4.8 K/UL (ref 1.8–8)
NEUTS SEG NFR BLD: 66 % (ref 32–75)
NITRITE UR QL STRIP.AUTO: NEGATIVE
NRBC # BLD: 0 K/UL (ref 0–0.01)
NRBC BLD-RTO: 0 PER 100 WBC
PH UR STRIP: 5 [PH] (ref 5–8)
PLATELET # BLD AUTO: 237 K/UL (ref 150–400)
PMV BLD AUTO: 11.3 FL (ref 8.9–12.9)
POTASSIUM SERPL-SCNC: 4.4 MMOL/L (ref 3.5–5.1)
PROT SERPL-MCNC: 7.7 G/DL (ref 6.4–8.2)
PROT UR STRIP-MCNC: 30 MG/DL
RBC # BLD AUTO: 4.2 M/UL (ref 3.8–5.2)
RBC #/AREA URNS HPF: ABNORMAL /HPF (ref 0–5)
SARS-COV-2, COV2: NORMAL
SODIUM SERPL-SCNC: 136 MMOL/L (ref 136–145)
SP GR UR REFRACTOMETRY: 1.03 (ref 1–1.03)
UROBILINOGEN UR QL STRIP.AUTO: 0.2 EU/DL (ref 0.2–1)
WBC # BLD AUTO: 7.4 K/UL (ref 3.6–11)
WBC URNS QL MICRO: ABNORMAL /HPF (ref 0–4)

## 2021-01-25 PROCEDURE — 83735 ASSAY OF MAGNESIUM: CPT

## 2021-01-25 PROCEDURE — U0005 INFEC AGEN DETEC AMPLI PROBE: HCPCS

## 2021-01-25 PROCEDURE — 74011250637 HC RX REV CODE- 250/637: Performed by: STUDENT IN AN ORGANIZED HEALTH CARE EDUCATION/TRAINING PROGRAM

## 2021-01-25 PROCEDURE — 71045 X-RAY EXAM CHEST 1 VIEW: CPT

## 2021-01-25 PROCEDURE — U0003 INFECTIOUS AGENT DETECTION BY NUCLEIC ACID (DNA OR RNA); SEVERE ACUTE RESPIRATORY SYNDROME CORONAVIRUS 2 (SARS-COV-2) (CORONAVIRUS DISEASE [COVID-19]), AMPLIFIED PROBE TECHNIQUE, MAKING USE OF HIGH THROUGHPUT TECHNOLOGIES AS DESCRIBED BY CMS-2020-01-R: HCPCS

## 2021-01-25 PROCEDURE — 80053 COMPREHEN METABOLIC PANEL: CPT

## 2021-01-25 PROCEDURE — 96374 THER/PROPH/DIAG INJ IV PUSH: CPT

## 2021-01-25 PROCEDURE — 36415 COLL VENOUS BLD VENIPUNCTURE: CPT

## 2021-01-25 PROCEDURE — 81001 URINALYSIS AUTO W/SCOPE: CPT

## 2021-01-25 PROCEDURE — 74011250636 HC RX REV CODE- 250/636: Performed by: STUDENT IN AN ORGANIZED HEALTH CARE EDUCATION/TRAINING PROGRAM

## 2021-01-25 PROCEDURE — 81025 URINE PREGNANCY TEST: CPT

## 2021-01-25 PROCEDURE — 85025 COMPLETE CBC W/AUTO DIFF WBC: CPT

## 2021-01-25 PROCEDURE — 99283 EMERGENCY DEPT VISIT LOW MDM: CPT

## 2021-01-25 RX ORDER — ACETAMINOPHEN 500 MG
1000 TABLET ORAL ONCE
Status: COMPLETED | OUTPATIENT
Start: 2021-01-25 | End: 2021-01-25

## 2021-01-25 RX ORDER — ONDANSETRON 2 MG/ML
4 INJECTION INTRAMUSCULAR; INTRAVENOUS
Status: COMPLETED | OUTPATIENT
Start: 2021-01-25 | End: 2021-01-25

## 2021-01-25 RX ADMIN — ONDANSETRON 4 MG: 2 INJECTION INTRAMUSCULAR; INTRAVENOUS at 17:28

## 2021-01-25 RX ADMIN — ACETAMINOPHEN 1000 MG: 500 TABLET ORAL at 17:30

## 2021-01-25 RX ADMIN — SODIUM CHLORIDE 1000 ML: 9 INJECTION, SOLUTION INTRAVENOUS at 17:31

## 2021-01-25 NOTE — Clinical Note
[address] 
[facility] EMERGENCY DEPT 
[address] [place] [address] [facility] [id number] [phone] Work/School Note Date: 1/25/2021 To Whom It May concern: 
 
Atif Hernandez was seen and treated today in the emergency room by the following provider(s): 
Attending Provider: Boris Rivas MD. Atif Hernandez is excused from work/school on 01/25/21 and 01/26/21. She is medically clear to return to work/school on 1/27/2021.   
 
 
Sincerely, 
 
 
 
 
Elena Cee MD

## 2021-01-25 NOTE — DISCHARGE INSTRUCTIONS
[facility] SYSTEMS Departments     For adult and child immunizations, family planning, TB screening, STD testing and women's health services. [facility]: [place] [phone]      [facility] 25   [address]   [address]   170 [facility]: Briseida Gonzalez [address] [phone]      [address]          [address] [address]     For primary care services, woman and child wellness, and some clinics providing specialty care. [facility] -- [facility]. 2525 [employer] [phone]   411 [facility] 200 [facility] 3614 [facility] [phone]   339 [facility] [address] [phone]   [address]  [employer] [address] [place] [address] 5850  Community  [phone]   [address] [phone]   [facility] 81 [facility] [phone]   [facility] [address] [place] [address] [phone]   [facility]: [facility] 700 Ana Rosa, ext [address] [place] [address], [address] [phone]     [address] [phone]   [facility] [address] [facility]  [phone]   [employer]  [address] ([email or web address]) [phone]         Sexual Health/Woman Wellness Clinics    For STD/HIV testing and treatment, pregnancy testing and services, men's health, birth control services, LGBT services, and hepatitis/HPV vaccine services. [facility] [place] [employer] 201 [address]. [facility] [address] [facility] [address] [facility] 1579 600 BRAYAN Small [phone]   [facility] [address], 5th floor [phone]   Pregnancy [facility] [address] for Women [address]  Charo Orozco [phone]         Specialty Service 1701 [facility]   [phone]   [place]   [phone]   Women, Infant and Children's Services: [address] [phone]       600 [facility]   [phone]   [address]   [facility]     [phone]   [address] Crisis   [address] [phone]       Local Primary Care Physicians  [facility] [phone]  MD Jaci Holt MD Ronelle Hammers, MD [facility] [phone]  Sailaja Simmons, P  MD Enmanuel Sifuentes MD Genevia Coffee, MD [address] [place] [address] [address] [phone]  MD Ralph Wilson MD [address] [facility] [phone]  MD Danica Sanchez MD Ulla Media, MD Kellie Sinclair, MD   [facility] [phone]  [facility]ACKSON , MD Adore [facility], NP [address] [place] [address] [phone]  Jan Collins, MD Yoshi Palumbo MD Shepard Ferris, MD Rock Mattes, MD Susanna Fritter, MD Brunetta Penton, MD   33 12 [facility]  Christelle Terrazas MD [facility] [phone]  MD Elana Brizuela, LYLE Boucher, MD Vincent Bautista MD Sean Minerva, MD Bennet Haley, MD   5942 [facility] [phone]  Gino Sarmiento, MD Arden Pathak, P  Diane Townsend, NP  MD Lianna Walker MD Ledora Bugler, MD Daleen Lerner, MD [facility] [phone]  Gene Goodell, MD Danny Nanas, MD Carol Mounts, MD Evetta Kelly, MD Renato Nordmann, MD   [address] [phone]  MD Peña Serrano MD [place] [phone]  Josph Meigs, MD Freddy Marion, MD Jessica Pitts Radha Tas, [address] [facility] [phone]  Edda Diane, MD Mally Davenport, MD Dwayne Zaman, MD Rafia Shen, MD Tamy Ponce, MD Naun Mckeon, NP  Dewayne Gordon MD 1619 [facility]   [phone]  MD Chayo Valero, MD Magali Rivera MD   2102 [facility] [phone]  Henna Gonzales, MD Thiago Yee, FNP  Silvana Lee, PAIMELDA Lee, FNP  Kody King, JAMEEL Fraser, MD Roly Fitch, LYLE Casey DO Miscellaneous:  Zeeshan Cordoba [phone]

## 2021-01-26 ENCOUNTER — PATIENT OUTREACH (OUTPATIENT)
Dept: CASE MANAGEMENT | Age: 26
End: 2021-01-26

## 2021-01-26 LAB
SARS-COV-2, XPLCVT: DETECTED
SOURCE, COVRS: ABNORMAL

## 2021-01-26 NOTE — ED PROVIDER NOTES
EMERGENCY DEPARTMENT HISTORY AND PHYSICAL EXAM      Date: 1/25/2021  Patient Name: Yamil Henson    History of Presenting Illness     Chief Complaint   Patient presents with    Fever     intermittent x 1 month    Abdominal Pain     intermittent x 1 month cramping. Pt reports hx of chrons concerns for infection    Sore Throat     since waking this morning. [facility] and had strep/ flu and covid test that came back in conclusive    Fatigue     x 1 month    Generalized Body Aches     x 1 month         HPI: Yamil Henson, 22 y.o. female presents to the ED with cc of body aches, subjective fevers, and intermittent abdominal pain. All of her symptoms seem to be slightly progressing over the past month, over the last day she is also noted a slightly sore throat. She reports chronic abdominal pain due to Crohn's disease, she usually has 4-5 episodes of diarrhea per day, also reports 1 episode of nonbloody emesis. Denies any change to her chronic abdominal discomfort. Does think that her urine has been slightly darker than usual lately, no dysuria. She does not currently take any medications. There are no other complaints, changes, or physical findings at this time. PCP: None    No current facility-administered medications on file prior to encounter. Current Outpatient Medications on File Prior to Encounter   Medication Sig Dispense Refill    pediatric multivitamin no.49 (FLINTSTONES GUMMIES) chew Take 2 Tabs by mouth daily. Indications: VITAMIN DEFICIENCY PREVENTION      PNV No12-Iron-FA-DSS-OM-3 29 mg iron-1 mg -50 mg CPKD Take 1 Tab by mouth daily. Indications: Pregnancy      pantoprazole (PROTONIX) 40 mg tablet Take 1 Tab by mouth daily. 40 Tab 2    mesalamine (LIALDA) 1.2 gram delayed release tablet Take 2 Tabs by mouth Daily (before breakfast).  40 Tab 3       Past History     Past Medical History:  Past Medical History:   Diagnosis Date    Anemia receiving iron transfusions- last one 10/20/2017    Crohn's colitis ([facility] [address]     H/O  section     Herpes simplex virus (HSV) infection     just oral cold sores    Infertility, female     due to PCOS and chron's    PCOS (polycystic ovarian syndrome)     Placental abruption     Polycystic disease, ovaries     Rhesus isoimmunization affecting pregnancy        Past Surgical History:  Past Surgical History:   Procedure Laterality Date     DELIVERY ONLY  2016     due to placental abruption;  demise    HX  SECTION      x 1       Family History:  Family History   Problem Relation Age of Onset    Cancer Mother 32        Cervical    Migraines Mother     Psychiatric Disorder Mother     Cancer Maternal Grandmother         Cervical and ovarian    Hypertension Maternal Grandmother     Psychiatric Disorder Maternal Grandmother     Diabetes Maternal Grandfather     Hypertension Maternal Grandfather     Heart Disease Maternal Grandfather     Cancer Maternal Grandfather         Thyroid        Social History:  Social History     Tobacco Use    Smoking status: Current Some Day Smoker     Packs/day: 0.50     Last attempt to quit: 2017     Years since quitting: 3.7    Smokeless tobacco: Never Used   Substance Use Topics    Alcohol use: Yes     Comment: socially    Drug use: No       Allergies: Allergies   Allergen Reactions    Codeine Anaphylaxis     Tolerates oxycodone    Nsaids (Non-Steroidal Anti-Inflammatory Drug) Other (comments)     Hx crohns disease          Review of Systems   Reports subjective fever  No eye pain  No ear pain  no shortness of breath  no chest pain  Reports chronic abdominal pain  no dysuria  Reports body aches  No rash  No lymphadenopathy  No weight loss    Physical Exam   Physical Exam  Constitutional:       General: She is not in acute distress. HENT:      Head: Normocephalic and atraumatic.    Eyes:      Extraocular Movements: Extraocular movements intact. Cardiovascular:      Rate and Rhythm: Normal rate and regular rhythm. Pulmonary:      Effort: Pulmonary effort is normal.      Breath sounds: Normal breath sounds. Abdominal:      General: Abdomen is flat. Palpations: Abdomen is soft. Tenderness: There is no abdominal tenderness. Musculoskeletal:         General: No swelling or tenderness. Skin:     General: Skin is warm and dry. Neurological:      General: No focal deficit present. Mental Status: She is alert and oriented to person, place, and time. Psychiatric:         Mood and Affect: Mood normal.         Diagnostic Study Results     Labs -     Recent Results (from the past 24 hour(s))   CBC WITH AUTOMATED DIFF    Collection Time: 01/25/21  4:57 PM   Result Value Ref Range    WBC 7.4 3.6 - 11.0 K/uL    RBC 4.20 3.80 - 5.20 M/uL    HGB 11.6 11.5 - 16.0 g/dL    HCT 37.5 35.0 - 47.0 %    MCV 89.3 80.0 - 99.0 FL    MCH 27.6 26.0 - 34.0 PG    MCHC 30.9 30.0 - 36.5 g/dL    RDW 12.4 11.5 - 14.5 %    PLATELET 417 528 - 820 K/uL    MPV 11.3 8.9 - 12.9 FL    NRBC 0.0 0  WBC    ABSOLUTE NRBC 0.00 0.00 - 0.01 K/uL    NEUTROPHILS 66 32 - 75 %    LYMPHOCYTES 26 12 - 49 %    MONOCYTES 6 5 - 13 %    EOSINOPHILS 2 0 - 7 %    BASOPHILS 0 0 - 1 %    IMMATURE GRANULOCYTES 0 0.0 - 0.5 %    ABS. NEUTROPHILS 4.8 1.8 - 8.0 K/UL    ABS. LYMPHOCYTES 1.9 0.8 - 3.5 K/UL    ABS. MONOCYTES 0.4 0.0 - 1.0 K/UL    ABS. EOSINOPHILS 0.2 0.0 - 0.4 K/UL    ABS. BASOPHILS 0.0 0.0 - 0.1 K/UL    ABS. IMM.  GRANS. 0.0 0.00 - 0.04 K/UL    DF AUTOMATED     METABOLIC PANEL, COMPREHENSIVE    Collection Time: 01/25/21  4:57 PM   Result Value Ref Range    Sodium 136 136 - 145 mmol/L    Potassium 4.4 3.5 - 5.1 mmol/L    Chloride 104 97 - 108 mmol/L    CO2 31 21 - 32 mmol/L    Anion gap 1 (L) 5 - 15 mmol/L    Glucose 94 65 - 100 mg/dL    BUN 14 6 - 20 MG/DL    Creatinine 0.68 0.55 - 1.02 MG/DL    BUN/Creatinine ratio 21 (H) 12 - 20      GFR est AA >60 >60 ml/min/1.73m2    GFR est non-AA >60 >60 ml/min/1.73m2    Calcium 8.7 8.5 - 10.1 MG/DL    Bilirubin, total 0.3 0.2 - 1.0 MG/DL    ALT (SGPT) 23 12 - 78 U/L    AST (SGOT) 15 15 - 37 U/L    Alk. phosphatase 105 45 - 117 U/L    Protein, total 7.7 6.4 - 8.2 g/dL    Albumin 3.8 3.5 - 5.0 g/dL    Globulin 3.9 2.0 - 4.0 g/dL    A-G Ratio 1.0 (L) 1.1 - 2.2     MAGNESIUM    Collection Time: 01/25/21  4:57 PM   Result Value Ref Range    Magnesium 2.1 1.6 - 2.4 mg/dL   URINALYSIS W/ RFLX MICROSCOPIC    Collection Time: 01/25/21  4:57 PM   Result Value Ref Range    Color DARK YELLOW      Appearance CLEAR CLEAR      Specific gravity 1.030 1.003 - 1.030      pH (UA) 5.0 5.0 - 8.0      Protein 30 (A) NEG mg/dL    Glucose Negative NEG mg/dL    Ketone TRACE (A) NEG mg/dL    Blood Negative NEG      Urobilinogen 0.2 0.2 - 1.0 EU/dL    Nitrites Negative NEG      Leukocyte Esterase Negative NEG      WBC 0-4 0 - 4 /hpf    RBC 0-5 0 - 5 /hpf    Epithelial cells FEW FEW /lpf    Bacteria 1+ (A) NEG /hpf    Mucus 3+ (A) NEG /lpf   BILIRUBIN, CONFIRM    Collection Time: 01/25/21  4:57 PM   Result Value Ref Range    Bilirubin UA, confirm Negative NEG     HCG URINE, QL. - POC    Collection Time: 01/25/21  5:04 PM   Result Value Ref Range    Pregnancy test,urine (POC) Negative NEG     SARS-COV-2    Collection Time: 01/25/21  6:02 PM   Result Value Ref Range    SARS-CoV-2 Please find results under separate order         Radiologic Studies -   XR CHEST PORT   Final Result   No acute disease radiographically confirmed. Metallic densities projecting over   the left lung base may lie in the patient's clothing. .  . CT Results  (Last 48 hours)    None        CXR Results  (Last 48 hours)               01/25/21 1634  XR CHEST PORT Final result    Impression:  No acute disease radiographically confirmed. Metallic densities projecting over   the left lung base may lie in the patient's clothing. .  .            Narrative:  INDICATION:  cough EXAM: Chest single view. COMPARISON: 2/26/2010. FINDINGS: A single frontal view of the chest at 1626 hours shows 2 metallic   densities projecting over the left lung base, which may lie in the patient's   clothing. There is no acute infiltrate or pleural effusion confirmed. .  The   heart, mediastinum and pulmonary vasculature are stable . The bony thorax is   unremarkable for age. .                   Medical Decision Making   I am the first provider for this patient. I reviewed the vital signs, available nursing notes, past medical history, past surgical history, family history and social history. Vital Signs-Reviewed the patient's vital signs. Patient Vitals for the past 24 hrs:   Temp Pulse Resp BP SpO2   01/25/21 1423 97.9 °F (36.6 °C) 60 16 122/75 100 %         Provider Notes (Medical Decision Making):   70-year-old presenting with myalgias, fevers, vomiting. Symptoms are consistent with a viral syndrome. Will test for Covid as well, chest x-ray to assess for pneumonia, she denies any purulent sputum. Will assess as well for possible UTI, electrolyte/metabolic abnormalities. Her abdominal exam is benign, she is afebrile and nontoxic-appearing. ED Course:     Initial assessment performed. The patients presenting problems have been discussed, and they are in agreement with the care plan formulated and outlined with them. I have encouraged them to ask questions as they arise throughout their visit. CBC negative for leukocytosis or anemia, UA negative for UTI, pregnancy test negative basic metabolic panel unremarkable, chest x-ray shows no acute infiltrates. On reevaluation, patient is resting comfortably and states that they feel improved. Vitals continue to be normal.  Patient is counseled on supportive care and return precautions. Will return to the ED for any worsening abdominal pain, intractable nausea vomiting, chest pain or shortness of breath.  Will followup with primary care doctor within 7 days. Critical Care Time:         Disposition:  Home    PLAN:  1. Discharge Medication List as of 1/25/2021  5:40 PM        2.    Follow-up Information    None       Return to ED if worse     Diagnosis     Clinical Impression: Myalgias and fevers likely viral syndrome

## 2021-01-26 NOTE — PROGRESS NOTES
Patient contacted regarding Job Shinsandrine. Discussed COVID-19 related testing which was pending at this time. Test results were pending. Patient informed of results, if available? no     Care Transition Nurse/ Ambulatory Care Manager contacted the patient by telephone to perform post discharge assessment. Call within 2 business days of discharge: Yes Verified name and  with patient as identifiers. Provided introduction to self, and explanation of the CTN/ACM role, and reason for call due to risk factors for infection and/or exposure to COVID-19. Symptoms reviewed with patient who verbalized the following symptoms: fatigue      Due to no new or worsening symptoms encounter was not routed to provider for escalation. Discussed follow-up appointments. If no appointment was previously scheduled, appointment scheduling offered:  yes   [facility] follow up appointment(s): No future appointments. [facility] follow up appointment(s): n/a     Advance Care Planning:   Does patient have an Advance Directive:  not on file. Patient has following risk factors of: immunocompromised and Crohns. CTN/ACM reviewed discharge instructions, medical action plan and red flags such as increased shortness of breath, increasing fever and signs of decompensation with patient who verbalized understanding. Discussed exposure protocols and quarantine with CDC Guidelines What to do if you are sick with coronavirus disease .  Patient was given an opportunity for questions and concerns. The patient agrees to contact the [facility] exposure line [phone], [facility] [address]  ([phone] and PCP office for questions related to their healthcare. CTN/ACM provided contact information for future needs.     Reviewed and educated patient on any new and changed medications related to discharge diagnosis     Patient/family/caregiver given information for GetWell Loop and agrees to enroll yes  Patient's preferred e-mail: [email or web address]   Patient's preferred phone number: [phone]  Based on Loop alert triggers, patient will be contacted by nurse care manager for worsening symptoms. Pt will be further monitored by COVID Loop Team based on severity of symptoms and risk factors.

## 2021-10-05 ENCOUNTER — HOSPITAL ENCOUNTER (OUTPATIENT)
Dept: PREADMISSION TESTING | Age: 26
Discharge: HOME | End: 2021-10-05
Payer: SELF-PAY

## 2021-10-05 VITALS
WEIGHT: 125.88 LBS | HEART RATE: 56 BPM | HEIGHT: 60 IN | DIASTOLIC BLOOD PRESSURE: 60 MMHG | SYSTOLIC BLOOD PRESSURE: 99 MMHG | BODY MASS INDEX: 24.71 KG/M2 | TEMPERATURE: 98.2 F | OXYGEN SATURATION: 100 %

## 2021-10-05 LAB
ANION GAP SERPL CALC-SCNC: 4 MMOL/L (ref 5–15)
BASOPHILS # BLD: 0 K/UL (ref 0–0.1)
BASOPHILS NFR BLD: 0 % (ref 0–1)
BUN SERPL-MCNC: 10 MG/DL (ref 6–20)
BUN/CREAT SERPL: 14 (ref 12–20)
CALCIUM SERPL-MCNC: 9 MG/DL (ref 8.5–10.1)
CHLORIDE SERPL-SCNC: 103 MMOL/L (ref 97–108)
CO2 SERPL-SCNC: 32 MMOL/L (ref 21–32)
CREAT SERPL-MCNC: 0.71 MG/DL (ref 0.55–1.02)
DIFFERENTIAL METHOD BLD: ABNORMAL
EOSINOPHIL # BLD: 0.4 K/UL (ref 0–0.4)
EOSINOPHIL NFR BLD: 5 % (ref 0–7)
ERYTHROCYTE [DISTWIDTH] IN BLOOD BY AUTOMATED COUNT: 13.2 % (ref 11.5–14.5)
GLUCOSE SERPL-MCNC: 65 MG/DL (ref 65–100)
HCT VFR BLD AUTO: 32.1 % (ref 35–47)
HGB BLD-MCNC: 9.8 G/DL (ref 11.5–16)
IMM GRANULOCYTES # BLD AUTO: 0 K/UL (ref 0–0.04)
IMM GRANULOCYTES NFR BLD AUTO: 0 % (ref 0–0.5)
LYMPHOCYTES # BLD: 2 K/UL (ref 0.8–3.5)
LYMPHOCYTES NFR BLD: 23 % (ref 12–49)
MCH RBC QN AUTO: 26.8 PG (ref 26–34)
MCHC RBC AUTO-ENTMCNC: 30.5 G/DL (ref 30–36.5)
MCV RBC AUTO: 87.9 FL (ref 80–99)
MONOCYTES # BLD: 0.6 K/UL (ref 0–1)
MONOCYTES NFR BLD: 7 % (ref 5–13)
NEUTS SEG # BLD: 5.6 K/UL (ref 1.8–8)
NEUTS SEG NFR BLD: 65 % (ref 32–75)
NRBC # BLD: 0 K/UL (ref 0–0.01)
NRBC BLD-RTO: 0 PER 100 WBC
PLATELET # BLD AUTO: 372 K/UL (ref 150–400)
PMV BLD AUTO: 11.8 FL (ref 8.9–12.9)
POTASSIUM SERPL-SCNC: 4.3 MMOL/L (ref 3.5–5.1)
RBC # BLD AUTO: 3.65 M/UL (ref 3.8–5.2)
SODIUM SERPL-SCNC: 139 MMOL/L (ref 136–145)
WBC # BLD AUTO: 8.6 K/UL (ref 3.6–11)

## 2021-10-05 PROCEDURE — 36415 COLL VENOUS BLD VENIPUNCTURE: CPT

## 2021-10-05 PROCEDURE — 85025 COMPLETE CBC W/AUTO DIFF WBC: CPT

## 2021-10-05 PROCEDURE — 80048 BASIC METABOLIC PNL TOTAL CA: CPT

## 2021-10-08 ENCOUNTER — TRANSCRIBE ORDER (OUTPATIENT)
Dept: REGISTRATION | Age: 26
End: 2021-10-08

## 2021-10-08 DIAGNOSIS — Z01.812 PRE-PROCEDURE LAB EXAM: Primary | ICD-10-CM

## 2021-10-21 ENCOUNTER — HOSPITAL ENCOUNTER (OUTPATIENT)
Dept: PREADMISSION TESTING | Age: 26
Discharge: HOME | End: 2021-10-21
Payer: SELF-PAY

## 2021-10-21 DIAGNOSIS — Z01.812 PRE-PROCEDURE LAB EXAM: ICD-10-CM

## 2021-10-21 PROCEDURE — U0003 INFECTIOUS AGENT DETECTION BY NUCLEIC ACID (DNA OR RNA); SEVERE ACUTE RESPIRATORY SYNDROME CORONAVIRUS 2 (SARS-COV-2) (CORONAVIRUS DISEASE [COVID-19]), AMPLIFIED PROBE TECHNIQUE, MAKING USE OF HIGH THROUGHPUT TECHNOLOGIES AS DESCRIBED BY CMS-2020-01-R: HCPCS

## 2021-10-22 ENCOUNTER — ANESTHESIA EVENT (OUTPATIENT)
Dept: MEDSURG UNIT | Age: 26
End: 2021-10-22
Payer: SELF-PAY

## 2021-10-22 LAB
SARS-COV-2, XPLCVT: NOT DETECTED
SOURCE, COVRS: NORMAL

## 2021-10-25 PROBLEM — Z41.1 ENCOUNTER FOR BREAST AUGMENTATION: Status: ACTIVE | Noted: 2021-10-25

## 2021-10-26 ENCOUNTER — HOSPITAL ENCOUNTER (OUTPATIENT)
Age: 26
Setting detail: OUTPATIENT SURGERY
Discharge: HOME | End: 2021-10-26
Attending: SURGERY | Admitting: SURGERY
Payer: SELF-PAY

## 2021-10-26 ENCOUNTER — ANESTHESIA (OUTPATIENT)
Dept: MEDSURG UNIT | Age: 26
End: 2021-10-26
Payer: SELF-PAY

## 2021-10-26 ENCOUNTER — APPOINTMENT (OUTPATIENT)
Dept: GENERAL RADIOLOGY | Age: 26
End: 2021-10-26
Attending: ANESTHESIOLOGY
Payer: SELF-PAY

## 2021-10-26 VITALS
WEIGHT: 122 LBS | HEART RATE: 54 BPM | BODY MASS INDEX: 23.83 KG/M2 | OXYGEN SATURATION: 100 % | SYSTOLIC BLOOD PRESSURE: 120 MMHG | TEMPERATURE: 98.6 F | RESPIRATION RATE: 16 BRPM | DIASTOLIC BLOOD PRESSURE: 74 MMHG

## 2021-10-26 LAB — HCG UR QL: NEGATIVE

## 2021-10-26 PROCEDURE — 71045 X-RAY EXAM CHEST 1 VIEW: CPT

## 2021-10-26 PROCEDURE — 74011250636 HC RX REV CODE- 250/636: Performed by: NURSE ANESTHETIST, CERTIFIED REGISTERED

## 2021-10-26 PROCEDURE — 77030026438 HC STYL ET INTUB CARD -A: Performed by: ANESTHESIOLOGY

## 2021-10-26 PROCEDURE — 81025 URINE PREGNANCY TEST: CPT

## 2021-10-26 PROCEDURE — 74011250636 HC RX REV CODE- 250/636: Performed by: SURGERY

## 2021-10-26 PROCEDURE — 74011000250 HC RX REV CODE- 250: Performed by: SURGERY

## 2021-10-26 PROCEDURE — 77030031139 HC SUT VCRL2 J&J -A: Performed by: SURGERY

## 2021-10-26 PROCEDURE — 76210000056 HC AMBSU PH II REC 1.5 TO 2 HR: Performed by: SURGERY

## 2021-10-26 PROCEDURE — C1789 PROSTHESIS, BREAST, IMP: HCPCS | Performed by: SURGERY

## 2021-10-26 PROCEDURE — 77030041680 HC PNCL ELECSURG SMK EVAC CNMD -B: Performed by: SURGERY

## 2021-10-26 PROCEDURE — 2709999900 HC NON-CHARGEABLE SUPPLY: Performed by: SURGERY

## 2021-10-26 PROCEDURE — 74011250636 HC RX REV CODE- 250/636: Performed by: ANESTHESIOLOGY

## 2021-10-26 PROCEDURE — 76030000003 HC AMB SURG OR TIME 1.5 TO 2: Performed by: SURGERY

## 2021-10-26 PROCEDURE — 77030040361 HC SLV COMPR DVT MDII -B: Performed by: SURGERY

## 2021-10-26 PROCEDURE — 77030002933 HC SUT MCRYL J&J -A: Performed by: SURGERY

## 2021-10-26 PROCEDURE — 77030026227 HC FUNL KELLR 2 PCH ALGN -C: Performed by: SURGERY

## 2021-10-26 PROCEDURE — 74011250637 HC RX REV CODE- 250/637: Performed by: ANESTHESIOLOGY

## 2021-10-26 PROCEDURE — 77030038692 HC WND DEB SYS IRMX -B: Performed by: SURGERY

## 2021-10-26 PROCEDURE — 2709999900 HC NON-CHARGEABLE SUPPLY

## 2021-10-26 PROCEDURE — 76060000063 HC AMB SURG ANES 1.5 TO 2 HR: Performed by: SURGERY

## 2021-10-26 PROCEDURE — 77030008684 HC TU ET CUF COVD -B: Performed by: ANESTHESIOLOGY

## 2021-10-26 PROCEDURE — 77030040922 HC BLNKT HYPOTHRM STRY -A

## 2021-10-26 PROCEDURE — 74011000250 HC RX REV CODE- 250: Performed by: NURSE ANESTHETIST, CERTIFIED REGISTERED

## 2021-10-26 PROCEDURE — 76210000036 HC AMBSU PH I REC 1.5 TO 2 HR: Performed by: SURGERY

## 2021-10-26 DEVICE — SMOOTH ROUND ULTRA HIGH PROFILE SILICONE GEL-FILLED BREAST IMPLANT, 650CC  SMOOTH ROUND SILICONE
Type: IMPLANTABLE DEVICE | Site: BREAST | Status: FUNCTIONAL
Brand: MENTOR MEMORYGEL BREAST IMPLANT

## 2021-10-26 RX ORDER — HYDROMORPHONE HYDROCHLORIDE 2 MG/ML
INJECTION, SOLUTION INTRAMUSCULAR; INTRAVENOUS; SUBCUTANEOUS AS NEEDED
Status: DISCONTINUED | OUTPATIENT
Start: 2021-10-26 | End: 2021-10-28 | Stop reason: HOSPADM

## 2021-10-26 RX ORDER — MIDAZOLAM HYDROCHLORIDE 1 MG/ML
1 INJECTION, SOLUTION INTRAMUSCULAR; INTRAVENOUS AS NEEDED
Status: DISCONTINUED | OUTPATIENT
Start: 2021-10-26 | End: 2021-10-26 | Stop reason: HOSPADM

## 2021-10-26 RX ORDER — ACETAMINOPHEN 325 MG/1
650 TABLET ORAL ONCE
Status: COMPLETED | OUTPATIENT
Start: 2021-10-26 | End: 2021-10-26

## 2021-10-26 RX ORDER — SODIUM CHLORIDE 0.9 % (FLUSH) 0.9 %
5-40 SYRINGE (ML) INJECTION AS NEEDED
Status: DISCONTINUED | OUTPATIENT
Start: 2021-10-26 | End: 2021-10-26 | Stop reason: HOSPADM

## 2021-10-26 RX ORDER — SODIUM CHLORIDE, SODIUM LACTATE, POTASSIUM CHLORIDE, CALCIUM CHLORIDE 600; 310; 30; 20 MG/100ML; MG/100ML; MG/100ML; MG/100ML
25 INJECTION, SOLUTION INTRAVENOUS CONTINUOUS
Status: DISCONTINUED | OUTPATIENT
Start: 2021-10-26 | End: 2021-10-26 | Stop reason: HOSPADM

## 2021-10-26 RX ORDER — SODIUM CHLORIDE, SODIUM LACTATE, POTASSIUM CHLORIDE, CALCIUM CHLORIDE 600; 310; 30; 20 MG/100ML; MG/100ML; MG/100ML; MG/100ML
INJECTION, SOLUTION INTRAVENOUS
Status: DISCONTINUED | OUTPATIENT
Start: 2021-10-26 | End: 2021-10-28 | Stop reason: HOSPADM

## 2021-10-26 RX ORDER — SUCCINYLCHOLINE CHLORIDE 20 MG/ML
INJECTION INTRAMUSCULAR; INTRAVENOUS AS NEEDED
Status: DISCONTINUED | OUTPATIENT
Start: 2021-10-26 | End: 2021-10-28 | Stop reason: HOSPADM

## 2021-10-26 RX ORDER — PROPOFOL 10 MG/ML
INJECTION, EMULSION INTRAVENOUS AS NEEDED
Status: DISCONTINUED | OUTPATIENT
Start: 2021-10-26 | End: 2021-10-28 | Stop reason: HOSPADM

## 2021-10-26 RX ORDER — FENTANYL CITRATE 50 UG/ML
25 INJECTION, SOLUTION INTRAMUSCULAR; INTRAVENOUS
Status: DISCONTINUED | OUTPATIENT
Start: 2021-10-26 | End: 2021-10-26 | Stop reason: HOSPADM

## 2021-10-26 RX ORDER — NEOSTIGMINE METHYLSULFATE 1 MG/ML
INJECTION, SOLUTION INTRAVENOUS AS NEEDED
Status: DISCONTINUED | OUTPATIENT
Start: 2021-10-26 | End: 2021-10-28 | Stop reason: HOSPADM

## 2021-10-26 RX ORDER — GLYCOPYRROLATE 0.2 MG/ML
INJECTION INTRAMUSCULAR; INTRAVENOUS AS NEEDED
Status: DISCONTINUED | OUTPATIENT
Start: 2021-10-26 | End: 2021-10-28 | Stop reason: HOSPADM

## 2021-10-26 RX ORDER — MORPHINE SULFATE 2 MG/ML
2 INJECTION, SOLUTION INTRAMUSCULAR; INTRAVENOUS
Status: DISCONTINUED | OUTPATIENT
Start: 2021-10-26 | End: 2021-10-26 | Stop reason: HOSPADM

## 2021-10-26 RX ORDER — DIPHENHYDRAMINE HYDROCHLORIDE 50 MG/ML
12.5 INJECTION, SOLUTION INTRAMUSCULAR; INTRAVENOUS AS NEEDED
Status: DISCONTINUED | OUTPATIENT
Start: 2021-10-26 | End: 2021-10-26 | Stop reason: HOSPADM

## 2021-10-26 RX ORDER — OXYCODONE HYDROCHLORIDE 5 MG/1
5 TABLET ORAL AS NEEDED
Status: DISCONTINUED | OUTPATIENT
Start: 2021-10-26 | End: 2021-10-26 | Stop reason: HOSPADM

## 2021-10-26 RX ORDER — ONDANSETRON 2 MG/ML
INJECTION INTRAMUSCULAR; INTRAVENOUS AS NEEDED
Status: DISCONTINUED | OUTPATIENT
Start: 2021-10-26 | End: 2021-10-28 | Stop reason: HOSPADM

## 2021-10-26 RX ORDER — ONDANSETRON 2 MG/ML
4 INJECTION INTRAMUSCULAR; INTRAVENOUS AS NEEDED
Status: DISCONTINUED | OUTPATIENT
Start: 2021-10-26 | End: 2021-10-26 | Stop reason: HOSPADM

## 2021-10-26 RX ORDER — SODIUM CHLORIDE 0.9 % (FLUSH) 0.9 %
5-40 SYRINGE (ML) INJECTION EVERY 8 HOURS
Status: DISCONTINUED | OUTPATIENT
Start: 2021-10-26 | End: 2021-10-26 | Stop reason: HOSPADM

## 2021-10-26 RX ORDER — ROPIVACAINE HYDROCHLORIDE 5 MG/ML
30 INJECTION, SOLUTION EPIDURAL; INFILTRATION; PERINEURAL AS NEEDED
Status: DISCONTINUED | OUTPATIENT
Start: 2021-10-26 | End: 2021-10-26 | Stop reason: HOSPADM

## 2021-10-26 RX ORDER — DEXAMETHASONE SODIUM PHOSPHATE 4 MG/ML
INJECTION, SOLUTION INTRA-ARTICULAR; INTRALESIONAL; INTRAMUSCULAR; INTRAVENOUS; SOFT TISSUE AS NEEDED
Status: DISCONTINUED | OUTPATIENT
Start: 2021-10-26 | End: 2021-10-28 | Stop reason: HOSPADM

## 2021-10-26 RX ORDER — FENTANYL CITRATE 50 UG/ML
INJECTION, SOLUTION INTRAMUSCULAR; INTRAVENOUS AS NEEDED
Status: DISCONTINUED | OUTPATIENT
Start: 2021-10-26 | End: 2021-10-28 | Stop reason: HOSPADM

## 2021-10-26 RX ORDER — ROCURONIUM BROMIDE 10 MG/ML
INJECTION, SOLUTION INTRAVENOUS AS NEEDED
Status: DISCONTINUED | OUTPATIENT
Start: 2021-10-26 | End: 2021-10-28 | Stop reason: HOSPADM

## 2021-10-26 RX ORDER — FENTANYL CITRATE 50 UG/ML
50 INJECTION, SOLUTION INTRAMUSCULAR; INTRAVENOUS AS NEEDED
Status: DISCONTINUED | OUTPATIENT
Start: 2021-10-26 | End: 2021-10-26 | Stop reason: HOSPADM

## 2021-10-26 RX ORDER — LIDOCAINE HYDROCHLORIDE 10 MG/ML
0.1 INJECTION, SOLUTION EPIDURAL; INFILTRATION; INTRACAUDAL; PERINEURAL AS NEEDED
Status: DISCONTINUED | OUTPATIENT
Start: 2021-10-26 | End: 2021-10-26 | Stop reason: HOSPADM

## 2021-10-26 RX ORDER — HYDROMORPHONE HYDROCHLORIDE 1 MG/ML
0.2 INJECTION, SOLUTION INTRAMUSCULAR; INTRAVENOUS; SUBCUTANEOUS
Status: DISCONTINUED | OUTPATIENT
Start: 2021-10-26 | End: 2021-10-26 | Stop reason: HOSPADM

## 2021-10-26 RX ORDER — SODIUM CHLORIDE 9 MG/ML
25 INJECTION, SOLUTION INTRAVENOUS CONTINUOUS
Status: DISCONTINUED | OUTPATIENT
Start: 2021-10-26 | End: 2021-10-26 | Stop reason: HOSPADM

## 2021-10-26 RX ORDER — LIDOCAINE HYDROCHLORIDE 20 MG/ML
INJECTION, SOLUTION EPIDURAL; INFILTRATION; INTRACAUDAL; PERINEURAL AS NEEDED
Status: DISCONTINUED | OUTPATIENT
Start: 2021-10-26 | End: 2021-10-28 | Stop reason: HOSPADM

## 2021-10-26 RX ORDER — SODIUM CHLORIDE, SODIUM LACTATE, POTASSIUM CHLORIDE, CALCIUM CHLORIDE 600; 310; 30; 20 MG/100ML; MG/100ML; MG/100ML; MG/100ML
100 INJECTION, SOLUTION INTRAVENOUS CONTINUOUS
Status: DISCONTINUED | OUTPATIENT
Start: 2021-10-26 | End: 2021-10-26 | Stop reason: HOSPADM

## 2021-10-26 RX ORDER — MIDAZOLAM HYDROCHLORIDE 1 MG/ML
0.5 INJECTION, SOLUTION INTRAMUSCULAR; INTRAVENOUS
Status: DISCONTINUED | OUTPATIENT
Start: 2021-10-26 | End: 2021-10-26 | Stop reason: HOSPADM

## 2021-10-26 RX ADMIN — NEOSTIGMINE METHYLSULFATE 3 MG: 1 INJECTION, SOLUTION INTRAVENOUS at 13:23

## 2021-10-26 RX ADMIN — HYDROMORPHONE HYDROCHLORIDE 1 MG: 2 INJECTION, SOLUTION INTRAMUSCULAR; INTRAVENOUS; SUBCUTANEOUS at 12:34

## 2021-10-26 RX ADMIN — MIDAZOLAM 2 MG: 1 INJECTION INTRAMUSCULAR; INTRAVENOUS at 13:50

## 2021-10-26 RX ADMIN — ONDANSETRON HYDROCHLORIDE 4 MG: 2 INJECTION, SOLUTION INTRAMUSCULAR; INTRAVENOUS at 12:30

## 2021-10-26 RX ADMIN — MIDAZOLAM 2 MG: 1 INJECTION INTRAMUSCULAR; INTRAVENOUS at 14:15

## 2021-10-26 RX ADMIN — PROPOFOL 200 MG: 10 INJECTION, EMULSION INTRAVENOUS at 12:11

## 2021-10-26 RX ADMIN — WATER 2 G: 1 INJECTION INTRAMUSCULAR; INTRAVENOUS; SUBCUTANEOUS at 12:14

## 2021-10-26 RX ADMIN — HYDROMORPHONE HYDROCHLORIDE 0.5 MG: 2 INJECTION, SOLUTION INTRAMUSCULAR; INTRAVENOUS; SUBCUTANEOUS at 13:46

## 2021-10-26 RX ADMIN — LIDOCAINE HYDROCHLORIDE 60 MG: 20 INJECTION, SOLUTION EPIDURAL; INFILTRATION; INTRACAUDAL; PERINEURAL at 12:11

## 2021-10-26 RX ADMIN — SODIUM CHLORIDE, POTASSIUM CHLORIDE, SODIUM LACTATE AND CALCIUM CHLORIDE: 600; 310; 30; 20 INJECTION, SOLUTION INTRAVENOUS at 11:57

## 2021-10-26 RX ADMIN — ROCURONIUM BROMIDE 25 MG: 10 SOLUTION INTRAVENOUS at 12:20

## 2021-10-26 RX ADMIN — SODIUM CHLORIDE, POTASSIUM CHLORIDE, SODIUM LACTATE AND CALCIUM CHLORIDE 25 ML/HR: 600; 310; 30; 20 INJECTION, SOLUTION INTRAVENOUS at 11:36

## 2021-10-26 RX ADMIN — ROCURONIUM BROMIDE 5 MG: 10 SOLUTION INTRAVENOUS at 12:11

## 2021-10-26 RX ADMIN — OXYCODONE 5 MG: 5 TABLET ORAL at 16:04

## 2021-10-26 RX ADMIN — HYDROMORPHONE HYDROCHLORIDE 0.5 MG: 2 INJECTION, SOLUTION INTRAMUSCULAR; INTRAVENOUS; SUBCUTANEOUS at 12:50

## 2021-10-26 RX ADMIN — DEXAMETHASONE SODIUM PHOSPHATE 4 MG: 4 INJECTION, SOLUTION INTRAMUSCULAR; INTRAVENOUS at 12:30

## 2021-10-26 RX ADMIN — MIDAZOLAM 2 MG: 1 INJECTION INTRAMUSCULAR; INTRAVENOUS at 12:00

## 2021-10-26 RX ADMIN — MIDAZOLAM 2 MG: 1 INJECTION INTRAMUSCULAR; INTRAVENOUS at 13:46

## 2021-10-26 RX ADMIN — MIDAZOLAM 2 MG: 1 INJECTION INTRAMUSCULAR; INTRAVENOUS at 14:05

## 2021-10-26 RX ADMIN — ACETAMINOPHEN 650 MG: 325 TABLET ORAL at 11:32

## 2021-10-26 RX ADMIN — GLYCOPYRROLATE 0.4 MG: 0.2 INJECTION, SOLUTION INTRAMUSCULAR; INTRAVENOUS at 13:23

## 2021-10-26 RX ADMIN — SUCCINYLCHOLINE CHLORIDE 120 MG: 20 INJECTION, SOLUTION INTRAMUSCULAR; INTRAVENOUS at 12:11

## 2021-10-26 RX ADMIN — SODIUM CHLORIDE, POTASSIUM CHLORIDE, SODIUM LACTATE AND CALCIUM CHLORIDE: 600; 310; 30; 20 INJECTION, SOLUTION INTRAVENOUS at 13:25

## 2021-10-26 RX ADMIN — FENTANYL CITRATE 100 MCG: 50 INJECTION, SOLUTION INTRAMUSCULAR; INTRAVENOUS at 12:11

## 2021-10-26 NOTE — H&P
Monica Garcia  : 1995  DOS: 10/19/2021    Chief Complaint: consultation       Allergies: Flagyl , codeine       Medications: None Indicated       Medical History: Height: 5' 0\", Weight: 130 lbs    Pulmonary System: Negative  Cardiac System: Negative  Blood and Liver Systems: Anemia  Neurologic and Endocrine Systems: Negative  GI,  and Reproductive Systems: Crohn's disease  Cancer: Negative   Surgical History: 2 c-sections       Family History: Cancer Maternal Grandmother       Social History: Smoking Status  2 Current some day smoker    Pregnancy Number of pregnancies:2      Number of children:1          Ms. Scott Prince is a pleasant 59-year-old female who presents today for a cosmetic consultation regarding breast augmentation. She was evaluated in 2018 for the same procedure but elected not to go forward. She returns today for another discussion regarding her desire to undergo elective breast surgery and is hoping to avoid mastopexy. She has 2 children delivered by  and breast-fed both children. Her breast cancer history is negative. She is interested in establishing or restoring pre-pregnancy volume and tone by means of augmentation. She is hoping to not require a mastopexy and is willing to choose a larger breast implant volume to avoid that. Review of systems reveals normal heart and lung sounds. Examination demonstrates what appears to be postpartum involution and loss of upper pole fullness. She has the beginning of pseudoptosis as there is a small amount of tissue at or slightly below the inframammary fold, but the nipple areolar complex is well above the fold. Her sternal notch to nipple distance is about 21 cm on the right, 21.5 cm on the left, and her ideal nipple position is around 18.5 cm. The nipple to fold distance is about 6 cm on the right and 5.5 cm on the left, but the base diameter of around 13 cm.     I had a lengthy discussion with Roney Cochran, regarding breast augmentation mammoplasty. Shabnam Harrison understands this is performed under a general anesthetic on an outpatient basis. I diagrammed on paper and patients right breast where the incisions are made and we discussed the differences between saline and silicone implants, smooth versus textured, round versus anatomic, submuscular versus subglandular, and the various incisions. In most cases an implant is placed in a submuscular plane and the incisions are closed in layers with dissolvable sutures. A compression Ace wrap is used for 2 days then converted to a loose surgical bra or no bra at all for 4 weeks. There is no strenuous exercise for 4 weeks. The risks and complications include but are not limited to infection, pain, bleeding, hematoma's requiring re-operation, skin sensitivity changes, vascular compromise to tissues resulting in partial or complete loss of tissues, resultant open wounds, the need for long term wound care and dressing changes and scarring, irregularities and asymmetries requiring touch-up and revision surgery, an unacceptable cosmetic result, and other things including cardiac and pulmonary risks that include death. Implant specific problems include capsular contracture, scalloping or rippling, implant malposition, implant failure, and implant infection. Because of her degree of laxity, my concern is an implant alone may not provide her with the most ideal result. She did state however that 1 of her goals is to avoid any mastopexy, and she is willing to undergo augmentation only with as large an implant as possible that will still allow her to look natural.    She was sized today by [facility] and selected around a 450-475 cc implant volume. Her questions were answered, and updated pictures were taken. She will meet with Nancy to discuss the fees for augmentation alone.     The patient was counseled about the risks of amanuel Covid-19 during their perioperative period and any recovery window from their procedure. The patient was made aware that amanuel Covid-19 may worsen their prognosis for recovering from their procedure and lend to a higher morbidity and/or mortality risk. All material risks, benefits, and reasonable alternatives including postponing the procedure were discussed. The patient DOES wish to proceed with their procedure at this time. Josette Roche M.D.  FACS

## 2021-10-26 NOTE — ANESTHESIA PREPROCEDURE EVALUATION
Relevant Problems   No relevant active problems       Anesthetic History     PONV          Review of Systems / Medical History  Patient summary reviewed, nursing notes reviewed and pertinent labs reviewed    Pulmonary          Smoker         Neuro/Psych   Within defined limits           Cardiovascular  Within defined limits                     GI/Hepatic/Renal     GERD      PUD     Endo/Other        Arthritis, cancer and anemia     Other Findings   Comments: Crohn's         Physical Exam    Airway  Mallampati: II  TM Distance: > 6 cm  Neck ROM: normal range of motion   Mouth opening: Normal     Cardiovascular  Regular rate and rhythm,  S1 and S2 normal,  no murmur, click, rub, or gallop             Dental  No notable dental hx       Pulmonary  Breath sounds clear to auscultation               Abdominal  GI exam deferred       Other Findings            Anesthetic Plan    ASA: 2  Anesthesia type: general          Induction: Intravenous  Anesthetic plan and risks discussed with: Patient

## 2021-10-26 NOTE — ROUTINE PROCESS
Patient: Vijaya Chanel MRN: [id number]  SSN: [id number]   YOB: 1995  Age: 32 y.o. Sex: female     Patient is status post Procedure(s):  BILATERAL BREAST AUGMENTATION. Surgeon(s) and Role:     * Concha Mckoen MD - Primary    Local/Dose/Irrigation:  SEE MAR                  Peripheral IV 10/26/21 Left;Posterior Hand (Active)   Site Assessment Clean, dry, & intact 10/26/21 1136   Phlebitis Assessment 0 10/26/21 1136   Infiltration Assessment 0 10/26/21 1136   Dressing Status Clean, dry, & intact 10/26/21 1136   Dressing Type Transparent;Tape 10/26/21 1136   Hub Color/Line Status Blue; Infusing 10/26/21 1136            Airway - Endotracheal Tube 10/26/21 Oral (Active)                   Dressing/Packing:  Incision 10/26/21 Breast-Dressing/Treatment: Steri-strips;Xeroform;Gauze dressing/dressing sponge;Surgical bra (10/26/21 1235)    Splint/Cast:  ]    Other:

## 2021-10-26 NOTE — INTERVAL H&P NOTE
Update History & Physical    The Patient's History and Physical of October 15,   2021 was reviewed with the patient and I examined the patient. There was no change. The surgical site was confirmed by the patient and me. Plan:  The risk, benefits, expected outcome, and alternative to the recommended procedure have been discussed with the patient. Patient understands and wants to proceed with the procedure.     Electronically signed by Juan M Shields MD on 10/26/2021 at 11:56 AM

## 2021-10-26 NOTE — DISCHARGE INSTRUCTIONS
Leave the surgical garment and strap and dressings ON and DRY for 48 hours then may remove for shower. Resume any important pre op medications and diet but use sport drinks like gatorade or power aid instead of water for 2-3 days and extra protein in diet helps wounds heal.  Keep head elevated at night when sleeping about 30 degrees, do not lay flat for about 2 weeks  Walk every 1-2 hours during the day in house while awake for 5-10 minutes during the first 2 weeks  Use stool softeners to prevent constipation  Do not take pain medications on an empty stomach  May tub bathe only during the first 48 hours  NO strenuous activity or exercise for 4 weeks  When you shower, remove the surgical garment and the white implant strap with the safety pin from the back/bottom of the bra. Discard any lose gauze, leave the white tapes over the incision area ON until they fall off naturally, shower like you normally would, place clean gauze over the incision area and the white tapes, and attach the white elastic implant with the safety pin to the back/bottom of the bra like you found it, and place the second garment on as you found the first one. You may now repeat daily or every other day, and launder the garments in between use. Call DR. Iliana Peterson at [phone] (cell) for questions  Anticipate a phone call from Dr. Doyle Singer from Nurse    PATIENT INSTRUCTIONS:    After general anesthesia or intravenous sedation, for 24 hours or while taking prescription Narcotics:  · Limit your activities  · Do not drive and operate hazardous machinery  · Do not make important personal or business decisions  · Do  not drink alcoholic beverages  · If you have not urinated within 8 hours after discharge, please contact your surgeon on call.     Report the following to your surgeon:  · Excessive pain, swelling, redness or odor of or around the surgical area  · Temperature over 100.5  · Nausea and vomiting lasting longer than 4 hours or if unable to take medications  · Any signs of decreased circulation or nerve impairment to extremity: change in color, persistent  numbness, tingling, coldness or increase pain  · Any questions    What to do at Home:  Recommended activity:  See surgical instructions    If you experience any of the following symptoms as noted above, please follow up with Dr. Jose C Kenyon. *  Please give a list of your current medications to your Primary Care Provider. *  Please update this list whenever your medications are discontinued, doses are      changed, or new medications (including over-the-counter products) are added. *  Please carry medication information at all times in case of emergency situations. These are general instructions for a healthy lifestyle:    No smoking/ No tobacco products/ Avoid exposure to second hand smoke  Surgeon General's Warning:  Quitting smoking now greatly reduces serious risk to your health. Obesity, smoking, and sedentary lifestyle greatly increases your risk for illness    A healthy diet, regular physical exercise & weight monitoring are important for maintaining a healthy lifestyle    You may be retaining fluid if you have a history of heart failure or if you experience any of the following symptoms:  Weight gain of 3 pounds or more overnight or 5 pounds in a week, increased swelling in our hands or feet or shortness of breath while lying flat in bed. Please call your doctor as soon as you notice any of these symptoms; do not wait until your next office visit. The discharge information has been reviewed with the patient. The patient verbalized understanding. Discharge medications reviewed with the patient and appropriate educational materials and side effects teaching were provided.   ___________________________________________________________________________________________________________________________________

## 2021-10-26 NOTE — PROGRESS NOTES
1545- Discharge instructions reviewed with patient's friend Suzi Joshi. She verbalized understanding and states that she has no questions. Suzi Joshi will not be able to pick her up until 445pm and she doesn't know of anyone else that can come get her before then. Patient sleeping in phase 2.     1600- Patient complaining of pain 10/10 and crying. Ok to give roxicodone in phase 2 and monitor. Chest xray results to Dr. Chamorro Prom. Per Dr. Chamorro Prom, patient ok for discharge after monitoring from roxicodone.

## 2021-10-26 NOTE — PROGRESS NOTES
1350: Patient arrived to PACU combative, agitated, and unable to be reoriented and for duration of time in phase 1. Four nurses and anesthesia, Dr. Atul Barlow at bedside. Bumper pads in place. Patient on cardiac monitor. Patient kept safe. Will continue to monitor. 1445: Verbal orders by Dr. Atul Barlow for portable CXR to rule out pneumothorax.

## 2021-10-26 NOTE — BRIEF OP NOTE
Brief Postoperative Note    Patient: Yamil Henson  YOB: 1995  MRN: [id number]    Date of Procedure: 10/26/2021     Pre-Op Diagnosis: COSMETIC    Post-Op Diagnosis: Same as preoperative diagnosis. Procedure(s):  BILATERAL BREAST AUGMENTATION    Surgeon(s):  Kemal Koo MD    Surgical Assistant: Surg Asst-1: Roman Lopez    Anesthesia: General     Estimated Blood Loss (mL): less than 50     Complications: None    Specimens: * No specimens in log *     Implants:   Implant Name Type Inv.  Item Serial No.  Lot No. LRB No. Used Action   IMPLANT BRST 650CC DIA12.9CM P6.4CM LETY [employer] RND HI - [id number]  IMPLANT BRST 650CC DIA12.9CM P6.4CM LETY G[employer]OTH RND HI [id number] [employer] [id number] Right 1 Implanted   IMPLANT BRST 650CC DIA12.9CM P6.4CM LETY G[employer]OTH RND HI - [id number]  IMPLANT BRST 650CC DIA12.9CM P6.4CM LETY G[employer]OTH RND [place] [id number] [employer] [id number] Left 1 Implanted       Drains: * No LDAs found *    Findings: normal for age and history    Electronically Signed by Rukhsana Wood MD on 10/26/2021 at 1:45 PM

## 2021-10-27 RX ORDER — MIDAZOLAM HYDROCHLORIDE 1 MG/ML
INJECTION, SOLUTION INTRAMUSCULAR; INTRAVENOUS AS NEEDED
Status: DISCONTINUED | OUTPATIENT
Start: 2021-10-26 | End: 2021-10-28 | Stop reason: HOSPADM

## 2021-10-27 NOTE — OP NOTES
[address] [facility]  OPERATIVE REPORT    Name:  Jose Alba  MR#:  [id number]  :  1995  ACCOUNT #:  [de-identified]  DATE OF SERVICE:  10/26/2021    PREOPERATIVE DIAGNOSES:  Mammary hypoplasia, postpartum involution, desires breast augmentation mammoplasty. POSTOPERATIVE DIAGNOSES:  Mammary hypoplasia, postpartum involution, desires breast augmentation mammoplasty. PROCEDURE PERFORMED:  Bilateral submuscular cohesive silicone gel breast augmentation mammoplasty. SURGEON:  Micheal Martinez MD    ASSISTANT:  Finn Weaver. STAFF:  OR staff, room #3, 7th Floor, [facility] Av:  General.    COMPLICATIONS:  None. SPECIMENS REMOVED:  None. IMPLANTS:  650 mL smooth round cohesive gel device, previously described, bilaterally. ESTIMATED BLOOD LOSS:  Approximately 25 mL. IV FLUIDS:  1100 mL. DRAINS:  None. FINDINGS:  Normal for age and history. INDICATIONS FOR PROCEDURE:  The patient is a 78-year-old female who was seen in the plastic surgery clinic with a desire to undergo breast augmentation mammoplasty. She is 5 feet 0 inches tall, 130 pounds. She has 1 live children, been pregnant twice and has appreciated involutional changes and loss of upper pole fullness secondary to her history. She was interested in reestablishing and adding volume to her breast and improvement in tone and balance and symmetry by means of breast augmentation mammoplasty. Preoperatively, she selected a 650 mL smooth round cohesive gel device through an inframammary incision in a submuscular plane, and comes in today for that procedure. PROCEDURE:  After appropriate consent was obtained, preoperative markings were placed. She was taken to the operating room and placed on the operating table in supine position. Satisfactory general endotracheal anesthesia was obtained. SCD devices were placed per routine.   A local anesthesia solution was injected around each breast based on our preoperative markings. Her chest was sterilely prepped and draped. We began on the patient's right side, performing the identical procedure bilaterally, making a 4-cm inframammary incision with a #10 scalpel blade based on our preoperative markings and the known diameter of the implant of 12.9 cm. The electrocautery was used to dissect through the skin and the subcutaneous tissue to the pectoralis major muscle. A submuscular subpectoral pocket was then dissected in standard fashion under direct lighted visualization releasing the inferomedial fibers of the pectoralis major muscle from their sternal and costal attachments. The lateral soft tissues were also released based on our preoperative markings with electrocautery. Once each pocket was dissected, they were inspected for symmetry and hemostasis. Each pocket was then irrigated with sterile saline and Irrisept irrigation solution. At this point, a [place] reference number [id number] smooth round, ultra-high profile 770 mL cohesive silicone gel breast implant was placed in each pocket. On the right side, it was lot number [id number]. On the left side, it was lot number [id number]. She was sat upright to inspect shape, size, and symmetry. This was felt to be acceptable. She was then placed back in the supine position and the inframammary incision wounds were closed in 3 layers reapproximating the breast capsule with 3-0 Vicryl as an interrupted simple stitch, the dermis with 3-0 Vicryl, and the skin with 4-0 Monocryl in a running subcuticular fashion. Sterile dressings of half inch Steri-Strips and Mastisol with Xeroform gauze, 4x4 gauze, and a surgical bra with a wide implant strap were placed. At the end of bilateral procedures, sponge and needle counts were reported as correct. She was awakened, extubated, and transferred to the recovery room in satisfactory and stable condition.   She will be discharged home today in care of her family with prescriptions and instructions and will follow up with me within 2 weeks.         Tiffanie Bustillos MD      [email or web address]  D:  10/26/2021 13:59  T:  10/26/2021 20:23  JOB #:  [id number]  CC:  Ian Almanzar MD

## 2021-10-28 NOTE — ANESTHESIA POSTPROCEDURE EVALUATION
Post-Anesthesia Evaluation and Assessment    Patient: Wandy Patel MRN: [id number]  SSN: [id number]    YOB: 1995  Age: 32 y.o. Sex: female      I have evaluated the patient and they are stable and ready for discharge from the PACU. Cardiovascular Function/Vital Signs  Visit Vitals  /74 (BP 1 Location: Left arm, BP Patient Position: At rest)   Pulse (!) 54   Temp 37 °C (98.6 °F)   Resp 16   Wt 55.3 kg (122 lb)   SpO2 100%   BMI 23.83 kg/m²       Patient is status post General anesthesia for Procedure(s):  BILATERAL BREAST AUGMENTATION. Nausea/Vomiting: None    Postoperative hydration reviewed and adequate. Pain:  Pain Scale 1: Numeric (0 - 10) (10/26/21 1608)  Pain Intensity 1: 10 (10/26/21 1608)   Managed    Neurological Status:   Neuro (WDL): Within Defined Limits (10/26/21 1608)  Neuro  Neurologic State: Appropriate for age; Alert (10/26/21 1608)  LUE Motor Response: Purposeful (10/26/21 1608)  LLE Motor Response: Purposeful (10/26/21 1608)  RUE Motor Response: Purposeful (10/26/21 1608)  RLE Motor Response: Purposeful (10/26/21 1608)   At baseline    Mental Status, Level of Consciousness: Alert and  oriented to person, place, and time    Pulmonary Status:   O2 Device: None (Room air) (10/26/21 1608)   Adequate oxygenation and airway patent    Complications related to anesthesia: Postop confusion resolved, pt reoriented    Post-anesthesia assessment completed. No concerns    Signed By: Melina Pereira MD     October 28, 2021              Procedure(s):  BILATERAL BREAST AUGMENTATION. general    <BSHSIANPOST>    INITIAL Post-op Vital signs:   Vitals Value Taken Time   /74 10/26/21 1515   Temp 37 °C (98.6 °F) 10/26/21 1500   Pulse 48 10/26/21 1520   Resp 17 10/26/21 1520   SpO2 97 % 10/26/21 1520   Vitals shown include unvalidated device data.

## 2022-03-18 PROBLEM — O47.00 PRETERM CONTRACTIONS: Status: ACTIVE | Noted: 2017-09-24

## 2022-03-18 PROBLEM — Z34.90 PREGNANCY: Status: ACTIVE | Noted: 2017-09-24

## 2022-03-18 PROBLEM — Z41.1 ENCOUNTER FOR BREAST AUGMENTATION: Status: ACTIVE | Noted: 2021-10-25

## 2022-03-19 PROBLEM — O21.0 HYPEREMESIS ARISING DURING PREGNANCY: Status: ACTIVE | Noted: 2017-07-14

## 2022-03-19 PROBLEM — K51.011 ULCERATIVE PANCOLITIS WITH RECTAL BLEEDING (HCC): Status: ACTIVE | Noted: 2017-07-14

## 2022-03-20 PROBLEM — Z98.891 PREVIOUS CESAREAN SECTION: Status: ACTIVE | Noted: 2017-07-14

## 2023-03-20 ENCOUNTER — HOSPITAL ENCOUNTER (OUTPATIENT)
Age: 28
Setting detail: OBSERVATION
Discharge: HOME | End: 2023-03-21
Attending: EMERGENCY MEDICINE | Admitting: STUDENT IN AN ORGANIZED HEALTH CARE EDUCATION/TRAINING PROGRAM
Payer: MEDICAID

## 2023-03-20 ENCOUNTER — APPOINTMENT (OUTPATIENT)
Dept: CT IMAGING | Age: 28
End: 2023-03-20
Attending: EMERGENCY MEDICINE
Payer: MEDICAID

## 2023-03-20 DIAGNOSIS — K51.00 ULCERATIVE PANCOLITIS WITHOUT COMPLICATION (HCC): Primary | ICD-10-CM

## 2023-03-20 DIAGNOSIS — R11.2 INTRACTABLE NAUSEA AND VOMITING: ICD-10-CM

## 2023-03-20 DIAGNOSIS — E86.0 MILD DEHYDRATION: ICD-10-CM

## 2023-03-20 LAB
ALBUMIN SERPL-MCNC: 3 G/DL (ref 3.5–5)
ALBUMIN SERPL-MCNC: 3.3 G/DL (ref 3.5–5)
ALBUMIN/GLOB SERPL: 0.7 (ref 1.1–2.2)
ALBUMIN/GLOB SERPL: 0.7 (ref 1.1–2.2)
ALP SERPL-CCNC: 84 U/L (ref 45–117)
ALP SERPL-CCNC: 91 U/L (ref 45–117)
ALT SERPL-CCNC: 14 U/L (ref 12–78)
ALT SERPL-CCNC: 15 U/L (ref 12–78)
ANION GAP BLD CALC-SCNC: 8 (ref 10–20)
ANION GAP SERPL CALC-SCNC: 4 MMOL/L (ref 5–15)
ANION GAP SERPL CALC-SCNC: 4 MMOL/L (ref 5–15)
APPEARANCE UR: ABNORMAL
AST SERPL-CCNC: 12 U/L (ref 15–37)
AST SERPL-CCNC: 18 U/L (ref 15–37)
BACTERIA URNS QL MICRO: NEGATIVE /HPF
BASE EXCESS BLD CALC-SCNC: 3.3 MMOL/L
BASOPHILS # BLD: 0 K/UL (ref 0–0.1)
BASOPHILS NFR BLD: 0 % (ref 0–1)
BILIRUB SERPL-MCNC: 0.2 MG/DL (ref 0.2–1)
BILIRUB SERPL-MCNC: 0.7 MG/DL (ref 0.2–1)
BILIRUB UR QL CFM: NEGATIVE
BUN SERPL-MCNC: 9 MG/DL (ref 6–20)
BUN SERPL-MCNC: 9 MG/DL (ref 6–20)
BUN/CREAT SERPL: 14 (ref 12–20)
BUN/CREAT SERPL: 16 (ref 12–20)
CA-I BLD-MCNC: 1.16 MMOL/L (ref 1.12–1.32)
CALCIUM SERPL-MCNC: 8.3 MG/DL (ref 8.5–10.1)
CALCIUM SERPL-MCNC: 8.7 MG/DL (ref 8.5–10.1)
CHLORIDE BLD-SCNC: 102 MMOL/L (ref 100–108)
CHLORIDE SERPL-SCNC: 105 MMOL/L (ref 97–108)
CHLORIDE SERPL-SCNC: 106 MMOL/L (ref 97–108)
CO2 BLD-SCNC: 29 MMOL/L (ref 19–24)
CO2 SERPL-SCNC: 28 MMOL/L (ref 21–32)
CO2 SERPL-SCNC: 28 MMOL/L (ref 21–32)
COLOR UR: ABNORMAL
CREAT SERPL-MCNC: 0.58 MG/DL (ref 0.55–1.02)
CREAT SERPL-MCNC: 0.64 MG/DL (ref 0.55–1.02)
CREAT UR-MCNC: 0.4 MG/DL (ref 0.6–1.3)
DIFFERENTIAL METHOD BLD: ABNORMAL
EOSINOPHIL # BLD: 0 K/UL (ref 0–0.4)
EOSINOPHIL NFR BLD: 0 % (ref 0–7)
EPITH CASTS URNS QL MICRO: ABNORMAL /LPF
ERYTHROCYTE [DISTWIDTH] IN BLOOD BY AUTOMATED COUNT: 15 % (ref 11.5–14.5)
GLOBULIN SER CALC-MCNC: 4.2 G/DL (ref 2–4)
GLOBULIN SER CALC-MCNC: 4.6 G/DL (ref 2–4)
GLUCOSE BLD STRIP.AUTO-MCNC: 88 MG/DL (ref 74–106)
GLUCOSE SERPL-MCNC: 89 MG/DL (ref 65–100)
GLUCOSE SERPL-MCNC: 95 MG/DL (ref 65–100)
GLUCOSE UR STRIP.AUTO-MCNC: NEGATIVE MG/DL
HCG UR QL: NEGATIVE
HCO3 BLDA-SCNC: 30 MMOL/L
HCT VFR BLD AUTO: 31 % (ref 35–47)
HGB BLD-MCNC: 9.7 G/DL (ref 11.5–16)
HGB UR QL STRIP: NEGATIVE
IMM GRANULOCYTES # BLD AUTO: 0 K/UL (ref 0–0.04)
IMM GRANULOCYTES NFR BLD AUTO: 0 % (ref 0–0.5)
KETONES UR QL STRIP.AUTO: 15 MG/DL
LACTATE BLD-SCNC: 0.77 MMOL/L (ref 0.4–2)
LEUKOCYTE ESTERASE UR QL STRIP.AUTO: NEGATIVE
LIPASE SERPL-CCNC: 27 U/L (ref 73–393)
LYMPHOCYTES # BLD: 1.2 K/UL (ref 0.8–3.5)
LYMPHOCYTES NFR BLD: 8 % (ref 12–49)
MCH RBC QN AUTO: 25.5 PG (ref 26–34)
MCHC RBC AUTO-ENTMCNC: 31.3 G/DL (ref 30–36.5)
MCV RBC AUTO: 81.6 FL (ref 80–99)
MONOCYTES # BLD: 0.8 K/UL (ref 0–1)
MONOCYTES NFR BLD: 6 % (ref 5–13)
NEUTS SEG # BLD: 12 K/UL (ref 1.8–8)
NEUTS SEG NFR BLD: 86 % (ref 32–75)
NITRITE UR QL STRIP.AUTO: NEGATIVE
NRBC # BLD: 0 K/UL (ref 0–0.01)
NRBC BLD-RTO: 0 PER 100 WBC
PCO2 BLDV: 53.1 MMHG (ref 41–51)
PH BLDV: 7.36 (ref 7.32–7.42)
PH UR STRIP: 5 (ref 5–8)
PLATELET # BLD AUTO: 398 K/UL (ref 150–400)
PMV BLD AUTO: 9.4 FL (ref 8.9–12.9)
PO2 BLDV: 38 MMHG (ref 25–40)
POTASSIUM BLD-SCNC: 4.1 MMOL/L (ref 3.5–5.5)
POTASSIUM SERPL-SCNC: 3.4 MMOL/L (ref 3.5–5.1)
POTASSIUM SERPL-SCNC: 3.5 MMOL/L (ref 3.5–5.1)
PROCALCITONIN SERPL-MCNC: 17.64 NG/ML
PROT SERPL-MCNC: 7.2 G/DL (ref 6.4–8.2)
PROT SERPL-MCNC: 7.9 G/DL (ref 6.4–8.2)
PROT UR STRIP-MCNC: 30 MG/DL
RBC # BLD AUTO: 3.8 M/UL (ref 3.8–5.2)
RBC #/AREA URNS HPF: ABNORMAL /HPF (ref 0–5)
SAO2 % BLD: 68 %
SODIUM BLD-SCNC: 139 MMOL/L (ref 136–145)
SODIUM SERPL-SCNC: 137 MMOL/L (ref 136–145)
SODIUM SERPL-SCNC: 138 MMOL/L (ref 136–145)
SP GR UR REFRACTOMETRY: >1.03 (ref 1–1.03)
SPECIMEN SITE: ABNORMAL
UA: UC IF INDICATED,UAUC: ABNORMAL
UROBILINOGEN UR QL STRIP.AUTO: 1 EU/DL (ref 0.2–1)
WBC # BLD AUTO: 14 K/UL (ref 3.6–11)
WBC URNS QL MICRO: ABNORMAL /HPF (ref 0–4)

## 2023-03-20 PROCEDURE — 96376 TX/PRO/DX INJ SAME DRUG ADON: CPT

## 2023-03-20 PROCEDURE — 74011000636 HC RX REV CODE- 636: Performed by: EMERGENCY MEDICINE

## 2023-03-20 PROCEDURE — 82947 ASSAY GLUCOSE BLOOD QUANT: CPT

## 2023-03-20 PROCEDURE — 74177 CT ABD & PELVIS W/CONTRAST: CPT

## 2023-03-20 PROCEDURE — 96372 THER/PROPH/DIAG INJ SC/IM: CPT

## 2023-03-20 PROCEDURE — 74011250636 HC RX REV CODE- 250/636: Performed by: EMERGENCY MEDICINE

## 2023-03-20 PROCEDURE — 96365 THER/PROPH/DIAG IV INF INIT: CPT

## 2023-03-20 PROCEDURE — 80053 COMPREHEN METABOLIC PANEL: CPT

## 2023-03-20 PROCEDURE — 85025 COMPLETE CBC W/AUTO DIFF WBC: CPT

## 2023-03-20 PROCEDURE — 84145 PROCALCITONIN (PCT): CPT

## 2023-03-20 PROCEDURE — 36415 COLL VENOUS BLD VENIPUNCTURE: CPT

## 2023-03-20 PROCEDURE — 81001 URINALYSIS AUTO W/SCOPE: CPT

## 2023-03-20 PROCEDURE — 74011000636 HC RX REV CODE- 636

## 2023-03-20 PROCEDURE — 96375 TX/PRO/DX INJ NEW DRUG ADDON: CPT

## 2023-03-20 PROCEDURE — 99285 EMERGENCY DEPT VISIT HI MDM: CPT

## 2023-03-20 PROCEDURE — 81025 URINE PREGNANCY TEST: CPT

## 2023-03-20 PROCEDURE — 83690 ASSAY OF LIPASE: CPT

## 2023-03-20 RX ORDER — METOCLOPRAMIDE HYDROCHLORIDE 5 MG/ML
10 INJECTION INTRAMUSCULAR; INTRAVENOUS
Status: COMPLETED | OUTPATIENT
Start: 2023-03-20 | End: 2023-03-20

## 2023-03-20 RX ORDER — MORPHINE SULFATE 2 MG/ML
6 INJECTION, SOLUTION INTRAMUSCULAR; INTRAVENOUS
Status: COMPLETED | OUTPATIENT
Start: 2023-03-20 | End: 2023-03-20

## 2023-03-20 RX ORDER — ONDANSETRON 2 MG/ML
4 INJECTION INTRAMUSCULAR; INTRAVENOUS
Status: COMPLETED | OUTPATIENT
Start: 2023-03-20 | End: 2023-03-20

## 2023-03-20 RX ORDER — SODIUM CHLORIDE 9 MG/ML
1000 INJECTION, SOLUTION INTRAVENOUS ONCE
Status: COMPLETED | OUTPATIENT
Start: 2023-03-20 | End: 2023-03-21

## 2023-03-20 RX ADMIN — METOCLOPRAMIDE 10 MG: 5 INJECTION, SOLUTION INTRAMUSCULAR; INTRAVENOUS at 22:50

## 2023-03-20 RX ADMIN — IOMEPROL INJECTION 100 ML: 714 INJECTION, SOLUTION INTRAVASCULAR at 23:54

## 2023-03-20 RX ADMIN — DIATRIZOATE MEGLUMINE AND DIATRIZOATE SODIUM 100 ML: 660; 100 LIQUID ORAL; RECTAL at 22:50

## 2023-03-20 RX ADMIN — ONDANSETRON 4 MG: 2 INJECTION INTRAMUSCULAR; INTRAVENOUS at 21:44

## 2023-03-20 RX ADMIN — MORPHINE SULFATE 6 MG: 2 INJECTION, SOLUTION INTRAMUSCULAR; INTRAVENOUS at 21:47

## 2023-03-20 RX ADMIN — SODIUM CHLORIDE 1000 ML: 9 INJECTION, SOLUTION INTRAVENOUS at 21:52

## 2023-03-21 VITALS
RESPIRATION RATE: 18 BRPM | TEMPERATURE: 97.9 F | DIASTOLIC BLOOD PRESSURE: 78 MMHG | HEART RATE: 86 BPM | SYSTOLIC BLOOD PRESSURE: 112 MMHG | WEIGHT: 144.4 LBS | OXYGEN SATURATION: 98 % | HEIGHT: 61 IN | BODY MASS INDEX: 27.26 KG/M2

## 2023-03-21 PROBLEM — K51.90 ULCERATIVE COLITIS (HCC): Status: ACTIVE | Noted: 2023-03-21

## 2023-03-21 LAB
ANION GAP SERPL CALC-SCNC: 4 MMOL/L (ref 5–15)
B PERT DNA SPEC QL NAA+PROBE: NOT DETECTED
BORDETELLA PARAPERTUSSIS PCR, BORPAR: NOT DETECTED
BUN SERPL-MCNC: 7 MG/DL (ref 6–20)
BUN/CREAT SERPL: 11 (ref 12–20)
C PNEUM DNA SPEC QL NAA+PROBE: NOT DETECTED
CALCIUM SERPL-MCNC: 7.8 MG/DL (ref 8.5–10.1)
CHLORIDE SERPL-SCNC: 107 MMOL/L (ref 97–108)
CO2 SERPL-SCNC: 27 MMOL/L (ref 21–32)
CREAT SERPL-MCNC: 0.66 MG/DL (ref 0.55–1.02)
CRP SERPL-MCNC: 7.75 MG/DL (ref 0–0.6)
ERYTHROCYTE [DISTWIDTH] IN BLOOD BY AUTOMATED COUNT: 15 % (ref 11.5–14.5)
ERYTHROCYTE [SEDIMENTATION RATE] IN BLOOD: 34 MM/HR (ref 0–20)
FLUAV SUBTYP SPEC NAA+PROBE: NOT DETECTED
FLUBV RNA SPEC QL NAA+PROBE: NOT DETECTED
GLUCOSE SERPL-MCNC: 106 MG/DL (ref 65–100)
HADV DNA SPEC QL NAA+PROBE: NOT DETECTED
HCOV 229E RNA SPEC QL NAA+PROBE: NOT DETECTED
HCOV HKU1 RNA SPEC QL NAA+PROBE: NOT DETECTED
HCOV NL63 RNA SPEC QL NAA+PROBE: NOT DETECTED
HCOV OC43 RNA SPEC QL NAA+PROBE: NOT DETECTED
HCT VFR BLD AUTO: 29 % (ref 35–47)
HGB BLD-MCNC: 8.9 G/DL (ref 11.5–16)
HMPV RNA SPEC QL NAA+PROBE: NOT DETECTED
HPIV1 RNA SPEC QL NAA+PROBE: NOT DETECTED
HPIV2 RNA SPEC QL NAA+PROBE: NOT DETECTED
HPIV3 RNA SPEC QL NAA+PROBE: NOT DETECTED
HPIV4 RNA SPEC QL NAA+PROBE: NOT DETECTED
M PNEUMO DNA SPEC QL NAA+PROBE: NOT DETECTED
MCH RBC QN AUTO: 24.9 PG (ref 26–34)
MCHC RBC AUTO-ENTMCNC: 30.7 G/DL (ref 30–36.5)
MCV RBC AUTO: 81.2 FL (ref 80–99)
NRBC # BLD: 0 K/UL (ref 0–0.01)
NRBC BLD-RTO: 0 PER 100 WBC
PLATELET # BLD AUTO: 384 K/UL (ref 150–400)
PMV BLD AUTO: 9.2 FL (ref 8.9–12.9)
POTASSIUM SERPL-SCNC: 3.2 MMOL/L (ref 3.5–5.1)
RBC # BLD AUTO: 3.57 M/UL (ref 3.8–5.2)
RSV RNA SPEC QL NAA+PROBE: NOT DETECTED
RV+EV RNA SPEC QL NAA+PROBE: NOT DETECTED
SARS-COV-2 RNA RESP QL NAA+PROBE: NOT DETECTED
SODIUM SERPL-SCNC: 138 MMOL/L (ref 136–145)
WBC # BLD AUTO: 15.8 K/UL (ref 3.6–11)

## 2023-03-21 PROCEDURE — 0202U NFCT DS 22 TRGT SARS-COV-2: CPT

## 2023-03-21 PROCEDURE — 74011250636 HC RX REV CODE- 250/636: Performed by: EMERGENCY MEDICINE

## 2023-03-21 PROCEDURE — 96376 TX/PRO/DX INJ SAME DRUG ADON: CPT

## 2023-03-21 PROCEDURE — 74011000250 HC RX REV CODE- 250: Performed by: STUDENT IN AN ORGANIZED HEALTH CARE EDUCATION/TRAINING PROGRAM

## 2023-03-21 PROCEDURE — 74011000258 HC RX REV CODE- 258: Performed by: EMERGENCY MEDICINE

## 2023-03-21 PROCEDURE — 87040 BLOOD CULTURE FOR BACTERIA: CPT

## 2023-03-21 PROCEDURE — G0378 HOSPITAL OBSERVATION PER HR: HCPCS

## 2023-03-21 PROCEDURE — C9113 INJ PANTOPRAZOLE SODIUM, VIA: HCPCS | Performed by: STUDENT IN AN ORGANIZED HEALTH CARE EDUCATION/TRAINING PROGRAM

## 2023-03-21 PROCEDURE — 80048 BASIC METABOLIC PNL TOTAL CA: CPT

## 2023-03-21 PROCEDURE — 96375 TX/PRO/DX INJ NEW DRUG ADDON: CPT

## 2023-03-21 PROCEDURE — 85027 COMPLETE CBC AUTOMATED: CPT

## 2023-03-21 PROCEDURE — 86140 C-REACTIVE PROTEIN: CPT

## 2023-03-21 PROCEDURE — 74011250636 HC RX REV CODE- 250/636: Performed by: STUDENT IN AN ORGANIZED HEALTH CARE EDUCATION/TRAINING PROGRAM

## 2023-03-21 PROCEDURE — 96372 THER/PROPH/DIAG INJ SC/IM: CPT

## 2023-03-21 PROCEDURE — 36415 COLL VENOUS BLD VENIPUNCTURE: CPT

## 2023-03-21 PROCEDURE — 85652 RBC SED RATE AUTOMATED: CPT

## 2023-03-21 PROCEDURE — 96365 THER/PROPH/DIAG IV INF INIT: CPT

## 2023-03-21 RX ORDER — ENOXAPARIN SODIUM 100 MG/ML
40 INJECTION SUBCUTANEOUS DAILY
Status: DISCONTINUED | OUTPATIENT
Start: 2023-03-21 | End: 2023-03-22 | Stop reason: HOSPADM

## 2023-03-21 RX ORDER — SODIUM CHLORIDE 0.9 % (FLUSH) 0.9 %
5-40 SYRINGE (ML) INJECTION AS NEEDED
Status: DISCONTINUED | OUTPATIENT
Start: 2023-03-21 | End: 2023-03-22 | Stop reason: HOSPADM

## 2023-03-21 RX ORDER — SODIUM CHLORIDE 0.9 % (FLUSH) 0.9 %
5-40 SYRINGE (ML) INJECTION EVERY 8 HOURS
Status: DISCONTINUED | OUTPATIENT
Start: 2023-03-21 | End: 2023-03-22 | Stop reason: HOSPADM

## 2023-03-21 RX ORDER — MORPHINE SULFATE 2 MG/ML
2 INJECTION, SOLUTION INTRAMUSCULAR; INTRAVENOUS
Status: DISCONTINUED | OUTPATIENT
Start: 2023-03-21 | End: 2023-03-22 | Stop reason: HOSPADM

## 2023-03-21 RX ORDER — ACETAMINOPHEN 325 MG/1
650 TABLET ORAL
Status: DISCONTINUED | OUTPATIENT
Start: 2023-03-21 | End: 2023-03-22 | Stop reason: HOSPADM

## 2023-03-21 RX ORDER — PREDNISONE 20 MG/1
40 TABLET ORAL DAILY
Qty: 8 TABLET | Refills: 0 | Status: SHIPPED | OUTPATIENT
Start: 2023-03-21 | End: 2023-03-25

## 2023-03-21 RX ORDER — ONDANSETRON 2 MG/ML
4 INJECTION INTRAMUSCULAR; INTRAVENOUS
Status: DISCONTINUED | OUTPATIENT
Start: 2023-03-21 | End: 2023-03-22 | Stop reason: HOSPADM

## 2023-03-21 RX ORDER — FENTANYL CITRATE 50 UG/ML
50 INJECTION, SOLUTION INTRAMUSCULAR; INTRAVENOUS
Status: COMPLETED | OUTPATIENT
Start: 2023-03-21 | End: 2023-03-21

## 2023-03-21 RX ORDER — ACETAMINOPHEN 650 MG/1
650 SUPPOSITORY RECTAL
Status: DISCONTINUED | OUTPATIENT
Start: 2023-03-21 | End: 2023-03-22 | Stop reason: HOSPADM

## 2023-03-21 RX ORDER — ONDANSETRON 4 MG/1
4 TABLET, ORALLY DISINTEGRATING ORAL
Status: DISCONTINUED | OUTPATIENT
Start: 2023-03-21 | End: 2023-03-22 | Stop reason: HOSPADM

## 2023-03-21 RX ADMIN — ONDANSETRON 4 MG: 2 INJECTION INTRAMUSCULAR; INTRAVENOUS at 08:34

## 2023-03-21 RX ADMIN — FENTANYL CITRATE 50 MCG: 50 INJECTION, SOLUTION INTRAMUSCULAR; INTRAVENOUS at 08:34

## 2023-03-21 RX ADMIN — ONDANSETRON 4 MG: 2 INJECTION INTRAMUSCULAR; INTRAVENOUS at 03:26

## 2023-03-21 RX ADMIN — ONDANSETRON 4 MG: 2 INJECTION INTRAMUSCULAR; INTRAVENOUS at 17:44

## 2023-03-21 RX ADMIN — FENTANYL CITRATE 50 MCG: 50 INJECTION, SOLUTION INTRAMUSCULAR; INTRAVENOUS at 03:29

## 2023-03-21 RX ADMIN — METHYLPREDNISOLONE SODIUM SUCCINATE 40 MG: 40 INJECTION, POWDER, FOR SOLUTION INTRAMUSCULAR; INTRAVENOUS at 08:33

## 2023-03-21 RX ADMIN — ENOXAPARIN SODIUM 40 MG: 100 INJECTION SUBCUTANEOUS at 08:34

## 2023-03-21 RX ADMIN — MORPHINE SULFATE 2 MG: 2 INJECTION, SOLUTION INTRAMUSCULAR; INTRAVENOUS at 18:15

## 2023-03-21 RX ADMIN — SODIUM CHLORIDE 40 MG: 9 INJECTION, SOLUTION INTRAMUSCULAR; INTRAVENOUS; SUBCUTANEOUS at 08:34

## 2023-03-21 RX ADMIN — PIPERACILLIN AND TAZOBACTAM 3.38 G: 3; .375 INJECTION, POWDER, FOR SOLUTION INTRAVENOUS at 00:34

## 2023-03-21 RX ADMIN — ONDANSETRON 4 MG: 2 INJECTION INTRAMUSCULAR; INTRAVENOUS at 12:26

## 2023-03-21 RX ADMIN — MORPHINE SULFATE 2 MG: 2 INJECTION, SOLUTION INTRAMUSCULAR; INTRAVENOUS at 13:02

## 2023-03-21 NOTE — ED NOTES
Bedside shift change report given to CECIL Johnson (oncoming nurse) by [employer] (offgoing nurse). Report included the following information SBAR, Kardex and ED Summary.

## 2023-03-21 NOTE — ED PROVIDER NOTES
EMERGENCY DEPARTMENT HISTORY AND PHYSICAL EXAM    Date: 3/20/2023  Patient Name: Aldo Shay  Patient Age and Sex: 32 y.o. female  MRN:  [id number]  CSN:  [id number]    History of Present Illness     Chief Complaint   Patient presents with    Abdominal Pain     Pt arrives ambulatory to triage for generalized pain/N/V/D x1 week. Pt has hx of Crohn's and believes this is a flair up. Dental Pain     Pt advises wisdom tooth pain     Vaginal Discharge     Pt believes she is having a yeast infection and home Monistat w/o relief        History Provided By: Patient    Ability to gather history was limited by:  HPI Limitations : None    HPI: Aldo Shay, 32 y.o. female with history of ulcerative colitis, complains of severe abdominal pain especially in the upper abdomen, accompanied by nausea and vomiting and diarrhea, which are moderate severity, for approximately 1 week. No fevers. She reports that the symptoms are typical of her ulcerative colitis flare. Also complaining of throbbing severe pain in her right lower jaw where she has a large cavity in one of her wisdom teeth that is supposed to be removed. Also having some mild vaginal irritation from what she believes is a yeast infection. Her primary concern is the severe abdominal pain and vomiting. Tobacco Use      Smoking status: Former        Packs/day: 0.50        Years: 15.00        Pack years: 7.5        Types: Cigarettes      Smokeless tobacco: Never     Past History   The patient's medical, surgical, and social history were reviewed by me today. Current Medications:  No current facility-administered medications on file prior to encounter. Current Outpatient Medications on File Prior to Encounter   Medication Sig Dispense Refill    mesalamine (LIALDA) 1.2 gram delayed release tablet Take 2 Tabs by mouth Daily (before breakfast).  (Patient taking differently: Take 2.4 g by mouth daily as needed.) 40 Tab 3       Past Medical History: Diagnosis Date    Anemia     receiving iron transfusions- last one 10/20/2017    Arthritis     Cancer ([facility] [address] 2021    CERVICAL    COVID-19 virus infection 2021    H/O COVID 19 INFECTION 2021    Crohn's colitis ([facility] [address]     GERD (gastroesophageal reflux disease)     H/O  section     Herpes simplex virus (HSV) infection     just oral cold sores    Infertility, female     due to PCOS and chron's    Iron deficiency     BLOOD TRANSFUSION     PCOS (polycystic ovarian syndrome)     Placental abruption     Polycystic disease, ovaries     Rhesus isoimmunization affecting pregnancy        Past Surgical History:   Procedure Laterality Date    HX BREAST AUGMENTATION Bilateral 10/26/2021    BILATERAL BREAST AUGMENTATION performed by Haroldo Younger MD at [facility] AMBULATORY OR     [address]      x 2    HX COLONOSCOPY      ID  DELIVERY ONLY  2016     due to placental abruption;  demise       Social History     Tobacco Use    Smoking status: Former     Packs/day: 0.50     Years: 15.00     Pack years: 7.50     Types: Cigarettes    Smokeless tobacco: Never   Substance Use Topics    Alcohol use: Not Currently     Comment: socially    Drug use: No       Allergies: Allergies   Allergen Reactions    Codeine Anaphylaxis     Tolerates oxycodone    Tramadol Other (comments)     \"Blacked Out\", Hallucinations, 3 day Migraine    Flagyl [Metronidazole] Rash     RASH, ITCHING    Nsaids (Non-Steroidal Anti-Inflammatory Drug) Other (comments)     Hx crohns disease      Review of Systems   A Review of Systems was reviewed by me today during this encounter. Pertinent positive and negative elements are noted in the HPI and MDM sections. Review of Systems   Constitutional:  Positive for fatigue. Negative for fever. HENT:  Positive for dental problem. Respiratory:  Negative for shortness of breath. Cardiovascular:  Negative for chest pain.    Gastrointestinal:  Positive for abdominal pain, diarrhea, nausea and vomiting. All other systems reviewed and are negative. Physical Exam   Vital Signs  No data found. Physical Exam  Vitals and nursing note reviewed. Constitutional:       General: She is in acute distress. Appearance: Normal appearance. She is well-developed. She is ill-appearing (Appears pale, uncomfortable, nauseated). HENT:      Mouth/Throat:        Comments: Large dental cavity and tooth #32  Cardiovascular:      Rate and Rhythm: Normal rate and regular rhythm. Heart sounds: Normal heart sounds. No murmur heard. Pulmonary:      Effort: Pulmonary effort is normal. No respiratory distress. Breath sounds: Normal breath sounds. No wheezing. Abdominal:      General: There is no distension. Palpations: Abdomen is soft. Tenderness: There is abdominal tenderness in the epigastric area. Musculoskeletal:         General: No deformity. Normal range of motion. Cervical back: Normal range of motion and neck supple. Skin:     General: Skin is warm and dry. Coloration: Skin is pale. Findings: No rash. Neurological:      General: No focal deficit present. Mental Status: She is alert and oriented to person, place, and time. Psychiatric:         Behavior: Behavior normal.         Thought Content: Thought content normal.       Diagnostic Study Results   Labs  No results found for this or any previous visit (from the past 24 hour(s)).      ==============================================================    Radiologic Studies  CT Results  (Last 48 hours)                 03/20/23 8141  CT ABD PELV W CONT Final result    Impression:  Wall thickening throughout the entire colon is compatible with the patient's   history of ulcerative colitis.        Narrative:  EXAM: CT ABD PELV W CONT       INDICATION: ulcerative colitis, upper abd pain and vomiting for a week,   leukocytosis       COMPARISON: None        CONTRAST: 100 mL of Isovue-370. ORAL CONTRAST: Oral contrast was administered to better evaluate the bowel. TECHNIQUE:    Following the uneventful intravenous administration of contrast, thin axial   images were obtained through the abdomen and pelvis. Coronal and sagittal   reconstructions were generated. CT dose reduction was achieved through use of a   standardized protocol tailored for this examination and automatic exposure   control for dose modulation. FINDINGS:    LOWER THORAX: No significant abnormality in the incidentally imaged lower chest.   LIVER: No mass. BILIARY TREE: Gallbladder is within normal limits. CBD is not dilated. SPLEEN: within normal limits. PANCREAS: No mass or ductal dilatation. ADRENALS: Unremarkable. KIDNEYS: No mass, calculus, or hydronephrosis. STOMACH: Unremarkable. SMALL BOWEL: No dilatation or wall thickening. COLON: Wall thickening is noted throughout the entire colon compatible with the   patient's history of ulcerative colitis. APPENDIX: Within normal limits. PERITONEUM: No ascites or pneumoperitoneum. Small mesenteric lymph nodes are   noted throughout the abdomen. RETROPERITONEUM: No lymphadenopathy or aortic aneurysm. REPRODUCTIVE ORGANS: Unremarkable. URINARY BLADDER: Decompressed. BONES: No destructive bone lesion. ABDOMINAL WALL: No mass or hernia. ADDITIONAL COMMENTS: N/A                 CXR Results  (Last 48 hours)      None            Critical Care and Billable Procedures   EKG reviewed by ED Physician George Ramírez in the absence of a cardiologist: Yes  EKG below was independently interpreted by me Melina Min MD)    Procedures    Medical Decision Making   I reviewed the patient's most recent Emergency Dept notes and diagnostic tests in formulating my MDM on today's visit.     Medications administered during ED course:  Medications   0.9% sodium chloride infusion 1,000 mL (0 mL IntraVENous IV Completed 3/21/23 0109)   morphine injection 6 mg (6 mg IntraVENous Given 3/20/23 2147)   ondansetron (ZOFRAN) injection 4 mg (4 mg IntraVENous Given 3/20/23 2144)   diatrizoate berna-diatrizoat sod (MD-GASTROVIEW,GASTROGRAFIN) 66-10 % contrast solution 100 mL (100 mL Oral Given 3/20/23 2250)   metoclopramide HCl (REGLAN) injection 10 mg (10 mg IntraVENous Given 3/20/23 2250)   iomeprol (IOMERON 350) 350 mg iodine /mL (71.44 %) contrast injection (100 mL  Given 3/20/23 2354)   piperacillin-tazobactam (ZOSYN) 3.375 g in 0.9% sodium chloride (MBP/ADV) 100 mL MBP (0 g IntraVENous IV Completed 3/21/23 0104)   fentaNYL citrate (PF) injection 50 mcg (50 mcg IntraVENous Given 3/21/23 0166)     Medical Decision Making // ED Course // Reassessment:  Renny Borjas, 32 y.o. female  with history of ulcerative colitis, complains of severe abdominal pain especially in the upper abdomen, accompanied by nausea and vomiting and diarrhea, which are moderate severity, for approximately 1 week. No fevers. She reports that the symptoms are typical of her ulcerative colitis flare. Also complaining of throbbing severe pain in her right lower jaw where she has a large cavity in one of her wisdom teeth that is supposed to be removed. Also having some mild vaginal irritation from what she believes is a yeast infection. Her primary concern is the severe abdominal pain and vomiting. On examination she has normal vital signs. She is pale, very nauseated, tender in the upper abdomen. Abdomen is otherwise soft and nondistended. She has a large dental cavity and tooth #32. Leukocytosis of 14, laboratories otherwise unremarkable. CT scan showing diffuse colon thickening, no abscess or SBO. Admit for intractable pain, UC flare, dehydration, N/V. Administered Zosyn, fluids, zofran.       Radiology results independently interpreted by me:     External Records reviewed:     Consults:  IP CONSULT TO GASTROENTEROLOGY    Tests considered but not performed: Differential Diagnoses ruled out:  SBO, abscess, severe sepsis      Additional documentation if relevant for this encounter     Diagnosis and Disposition     Disposition:  Admitted    Final Diagnosis: Ulcerative Colitis flare, intractable nausea and vomiting, mild dehydration    Discharge Medication List as of 3/21/2023  7:58 PM        START taking these medications    Details   predniSONE (DELTASONE) 20 mg tablet Take 2 Tablets by mouth daily for 4 days. , Normal, Disp-8 Tablet, R-0           CONTINUE these medications which have NOT CHANGED    Details   mesalamine (LIALDA) 1.2 gram delayed release tablet Take 2 Tabs by mouth Daily (before breakfast). , Print, Disp-40 Tab, R-3           STOP taking these medications       pantoprazole (PROTONIX) 40 mg tablet Comments:   Reason for Stopping: Follow up: Follow-up Information       Follow up With Specialties Details Why Contact Info    None    None ([phone]) Patient stated that they have no PCP            Disclaimers   I was the first provider for this patient on this visit. To the best of my ability I reviewed relevant prior medical records, electrocardiograms, laboratories, and radiologic studies. The patient's presenting problems were discussed, and the patient was in agreement with the care plan formulated and outlined with them. Please note that this dictation was completed with Dragon voice recognition software. Quite often unanticipated grammatical, syntax, homophones, and other interpretive errors are inadvertently transcribed by the computer software. Please disregard these errors and excuse any errors that have escaped final proofreading. Alex Hancock MD    I personally performed the services described in this documentation on this date 3/20/2023 for patient Avelina Pham.       Alex Hancock MD  9:19 PM

## 2023-03-21 NOTE — ED NOTES
eTRANSFER SBAR NOTE    IP UNIT CALLED NOTE IS READY: Yes Spoke to MIN    IF there are questions Call transferring nurse (your name) Monesandrine Didierbuddy at phone # [phone]    SITUATION/BACKGROUND:    Patient is being transferred to [address], Room# [phone]    Patient's Chief Complaint on arrival to ED was abdominal pain and is admitted for crohns. CODE STATUS: Full Code    VITAL SIGNS (MUST BE WITHIN 1 HOUR OF TRANSFER TO IP UNIT:    TEMP: 98.5  PULSE: 85  RESP: 18  BP: 95/74  PAIN SCORE: 3/10  MEWS: 2     ISOLATION/PRECAUTIONS: yes  ISOLATION TYPE: enteric    Called outstanding consults: Yes     Are there sign and held orders that need to be released? No   Are all STAT orders completed:  need stool sample    STAT labs collected:  need stool sample  REPEAT LACTIC ACID DUE? No  TIME DUE:none  Critical Labs Results? No  What? Are there any titrating drips? No   If so what? none    The following personal items will be sent with the patient during transfer to the floor: All valuables:   ITEM:    ITEM:    ITEM:           ASSESSMENT:    CIWA Assessment: No  Last Score: n/a    NEURO:   NIH SCORE:        ALLIE SCREENING:          NEURO ASSESSMENT:        Is patient impulsive? No   Is patient oriented? Yes   Do they follow commands? Yes  Is the patient ambulatory? Yes Device need none    FALL RISK? No    Is the [place] 1 Assessment completed? Yes  INTERVENTIONS: standard precautions    INTEGUMENTARY:   IS THE PATIENT UNDRESSED? Prefers own clothes  WOUNDS PRESENT? No  On admit to ED No   ARE THE WOUNDS DOCUMENTED? N/a    RESPIRATORY:   Is patient on Oxygen? No    OXYGEN: Oxygen Therapy  O2 Device: None (Room air) (03/21/23 0301)  IS patient on VENT? No      CARDIAC:   Is cardiac monitoring ordered? No  Last Rhythm: sr  Patient to transfer with tele box on? No   Is patient using a LIFE VEST? No     LINE ACCESS:   Peripheral IV 03/20/23 Left Antecubital (Active)        /GI:   CONTINENT BOWEL/BLADDER?  Yes  URINARY OUTPUT: voiding   Written Order for [place] Cath? No   CHRONIC OR ACUTE? none   If CHRONIC, is it 1days old, was it changed prior to specimen collection? No   WAS UA WITH REFLEX SENT TO LAB? Yes IF NO,  COLLECT AND SEND PRIOR TO TRANSPORT TO INPATIENT AREA    RESTRAINTS IN USE: No      IS DOCUMENTATION COMPLETE: Yes  Is there a current Order? No  When does it ?  N/a    Additional details as Needed:

## 2023-03-21 NOTE — CONSULTS
Jalen Han, NP-C  [phone] cell     Gastroenterology Consultation Note      Admit Date: 3/20/2023  Consult Date: 3/21/2023   I greatly appreciate your asking me to see Renny Borjas, thank you very much for the opportunity to participate in her care. Narrative Assessment and Plan   GI consultation for abdominal pain, diarrhea, rectal bleeding. 59-year-old female with history of UC who has not been on any medications for the past 3 years. Has had worsening abdominal pain, diarrhea, and rectal bleeding since taking ibuprofen every 4 hours for the past month for a dental infection. No recent antibiotic use. Her last colonoscopy was in 2015. Last seen in our office 4/2020 with several labs, stool studies, and colonoscopy ordered but never completed. CT shows wall thickening affecting entire colon. Impression:  Abdominal pain  Diarrhea  Rectal bleeding  UC flare  CT findings of colitis affecting entire colon    Check stool studies given UC history and leukocytosis. Steroids have already been started by primary team.   Will need colonoscopy, timing TBD  Clear liquid diet for now    Subjective:     Chief Complaint: Abdominal Pain, diarrhea, rectal bleeding, probable UC flare    History of Present Illness: GI consultation for probable UC flare. 59-year-old female who was last seen in our office 4/2020 by Dr. Mohsen Nelson. Review of the office note shows that she was diagnosed with pan ulcerative colitis in 2015 by Dr. Jareth Lee and was started on 5-ASA at that time. She has been on and off mesalamine's since that time, mostly going back on them whenever she would have a flare. Also would typically complete a course of steroids with her flares. There was some question regarding Crohn's disease or ulcerative colitis. Some work-up was started for biologic at the time of her last visit she had a negative hepatitis panel and TPMT.   Additional labs and colonoscopies were not completed and we were unable to reach her by phone or mail to schedule these. She was prescribed Rowasa but I cannot see that she ever started it. Today during our visit she states that she has not been on any medications for her UC since the last time we saw her and the past 3 years have been a roller coaster of abdominal pain with diarrhea and rectal bleeding that has waxed and waned. It became more severe recently after taking 800 mg of ibuprofen every 4 hours for the last month for a tooth infection. CT scan with contrast showed wall thickening throughout the entire colon. She denies taking any antibiotics in the last 6 months. Fecal calprotectin is pending and enteric panel was ordered but has not yet been sent. Pertinent labs: WBC 15.8, Hgb 8.9, HCT 29, MCV 81.2, platelets 821, sodium 138, potassium 3.2, BUN 7, creatinine 0.66, albumin 3 PCT 17.64, ESR 34.     PCP:  None    Past Medical History:   Diagnosis Date    Anemia     receiving iron transfusions- last one 10/20/2017    Arthritis     Cancer ([facility] [address] 2021    CERVICAL    COVID-19 virus infection 2021    H/O COVID 19 INFECTION 2021    Crohn's colitis ([facility] [address]     GERD (gastroesophageal reflux disease)     H/O  section     Herpes simplex virus (HSV) infection     just oral cold sores    Infertility, female     due to PCOS and chron's    Iron deficiency     BLOOD TRANSFUSION     PCOS (polycystic ovarian syndrome)     Placental abruption     Polycystic disease, ovaries     Rhesus isoimmunization affecting pregnancy         Past Surgical History:   Procedure Laterality Date    HX BREAST AUGMENTATION Bilateral 10/26/2021    BILATERAL BREAST AUGMENTATION performed by Delford Burkitt, MD at [facility]     [facility]      x 2    HX COLONOSCOPY  2014    NM  DELIVERY ONLY  2016     due to placental abruption;  demise       Social History     Tobacco Use    Smoking status: Former     Packs/day: 0.50 Years: 15.00     Pack years: 7.50     Types: Cigarettes    Smokeless tobacco: Never   Substance Use Topics    Alcohol use: Not Currently     Comment: socially        Family History   Problem Relation Age of Onset    Cancer Mother 32        Cervical    Migraines Mother     Psychiatric Disorder Mother     Cancer Maternal Grandmother         Cervical and ovarian    Hypertension Maternal Grandmother     Psychiatric Disorder Maternal Grandmother     Diabetes Maternal Grandfather     Hypertension Maternal Grandfather     Heart Disease Maternal Grandfather     Cancer Maternal Grandfather         Thyroid     Cancer Father         LIVER    Psychiatric Disorder Father     Cancer Sister         CERVICAL    Other Sister         LABILE BP - SYNCOPE    Anesth Problems Neg Hx         Allergies   Allergen Reactions    Codeine Anaphylaxis     Tolerates oxycodone    Tramadol Other (comments)     \"Blacked Out\", Hallucinations, 3 day Migraine    Flagyl [Metronidazole] Rash     RASH, ITCHING    Nsaids (Non-Steroidal Anti-Inflammatory Drug) Other (comments)     Hx crohns disease             Home Medications:  Prior to Admission Medications   Prescriptions Last Dose Informant Patient Reported? Taking? mesalamine (LIALDA) 1.2 gram delayed release tablet   No No   Sig: Take 2 Tabs by mouth Daily (before breakfast). Patient taking differently: Take 2.4 g by mouth daily as needed. pantoprazole (PROTONIX) 40 mg tablet   No No   Sig: Take 1 Tab by mouth daily.    Patient not taking: Reported on 10/5/2021      Facility-Administered Medications: None       Hospital Medications:  Current Facility-Administered Medications   Medication Dose Route Frequency    ondansetron (ZOFRAN) injection 4 mg  4 mg IntraVENous Q4H PRN    methylPREDNISolone (PF) (SOLU-MEDROL) injection 40 mg  40 mg IntraVENous Q12H    pantoprazole (PROTONIX) 40 mg in 0.9% sodium chloride 10 mL injection  40 mg IntraVENous Q12H    morphine injection 2 mg  2 mg IntraVENous Q4H PRN    sodium chloride (NS) flush 5-40 mL  5-40 mL IntraVENous Q8H    sodium chloride (NS) flush 5-40 mL  5-40 mL IntraVENous PRN    acetaminophen (TYLENOL) tablet 650 mg  650 mg Oral Q6H PRN    Or    acetaminophen (TYLENOL) suppository 650 mg  650 mg Rectal Q6H PRN    ondansetron (ZOFRAN ODT) tablet 4 mg  4 mg Oral Q8H PRN    Or    ondansetron (ZOFRAN) injection 4 mg  4 mg IntraVENous Q6H PRN    enoxaparin (LOVENOX) injection 40 mg  40 mg SubCUTAneous DAILY     Current Outpatient Medications   Medication Sig    pantoprazole (PROTONIX) 40 mg tablet Take 1 Tab by mouth daily. (Patient not taking: Reported on 10/5/2021)    mesalamine (LIALDA) 1.2 gram delayed release tablet Take 2 Tabs by mouth Daily (before breakfast). (Patient taking differently: Take 2.4 g by mouth daily as needed.)       Review of Systems: Per HPI, otherwise negative. Objective:     Physical Exam:  Visit Vitals  /64   Pulse 98   Temp 98.5 °F (36.9 °C)   Resp 16   Ht 5' 1\" (1.549 m)   Wt 65.5 kg (144 lb 6.4 oz)   SpO2 96%   BMI 27.28 kg/m²     SpO2 Readings from Last 6 Encounters:   03/21/23 96%   10/26/21 100%   10/05/21 100%   01/25/21 100%   09/06/20 100%   07/26/20 99%          Intake/Output Summary (Last 24 hours) at 3/21/2023 1056  Last data filed at 3/21/2023 0109  Gross per 24 hour   Intake 1100 ml   Output --   Net 1100 ml      General: uncomfortable  Skin:  No rash or palpable dermatologic mass lesions  HEENT: Pupils equal, sclera anicteric, oropharynx with no gross lesions  Cardiovascular: No abnormal audible heart sounds, well perfused, no edema  Respiratory:  No abnormal audible breath sounds, normal respiratory effort, no throacic deformity  GI:  Abdomen nondistended, generalized TTP  Musculoskeletal:  No skeletal deformity nor acute arthritis noted.   Neurological:  Motor and sensory function intact in upper extremeties  Psychiatric:  Normal affect, memory intact, appears to have insight into current illness    Laboratory: Recent Results (from the past 24 hour(s))   CBC WITH AUTOMATED DIFF    Collection Time: 03/20/23  8:02 PM   Result Value Ref Range    WBC 14.0 (H) 3.6 - 11.0 K/uL    RBC 3.80 3.80 - 5.20 M/uL    HGB 9.7 (L) 11.5 - 16.0 g/dL    HCT 31.0 (L) 35.0 - 47.0 %    MCV 81.6 80.0 - 99.0 FL    MCH 25.5 (L) 26.0 - 34.0 PG    MCHC 31.3 30.0 - 36.5 g/dL    RDW 15.0 (H) 11.5 - 14.5 %    PLATELET 581 349 - 639 K/uL    MPV 9.4 8.9 - 12.9 FL    NRBC 0.0 0  WBC    ABSOLUTE NRBC 0.00 0.00 - 0.01 K/uL    NEUTROPHILS 86 (H) 32 - 75 %    LYMPHOCYTES 8 (L) 12 - 49 %    MONOCYTES 6 5 - 13 %    EOSINOPHILS 0 0 - 7 %    BASOPHILS 0 0 - 1 %    IMMATURE GRANULOCYTES 0 0.0 - 0.5 %    ABS. NEUTROPHILS 12.0 (H) 1.8 - 8.0 K/UL    ABS. LYMPHOCYTES 1.2 0.8 - 3.5 K/UL    ABS. MONOCYTES 0.8 0.0 - 1.0 K/UL    ABS. EOSINOPHILS 0.0 0.0 - 0.4 K/UL    ABS. BASOPHILS 0.0 0.0 - 0.1 K/UL    ABS. IMM. GRANS. 0.0 0.00 - 0.04 K/UL    DF AUTOMATED     METABOLIC PANEL, COMPREHENSIVE    Collection Time: 03/20/23  8:02 PM   Result Value Ref Range    Sodium 137 136 - 145 mmol/L    Potassium 3.4 (L) 3.5 - 5.1 mmol/L    Chloride 105 97 - 108 mmol/L    CO2 28 21 - 32 mmol/L    Anion gap 4 (L) 5 - 15 mmol/L    Glucose 95 65 - 100 mg/dL    BUN 9 6 - 20 MG/DL    Creatinine 0.64 0.55 - 1.02 MG/DL    BUN/Creatinine ratio 14 12 - 20      eGFR >60 >60 ml/min/1.73m2    Calcium 8.7 8.5 - 10.1 MG/DL    Bilirubin, total 0.7 0.2 - 1.0 MG/DL    ALT (SGPT) 15 12 - 78 U/L    AST (SGOT) 18 15 - 37 U/L    Alk.  phosphatase 91 45 - 117 U/L    Protein, total 7.9 6.4 - 8.2 g/dL    Albumin 3.3 (L) 3.5 - 5.0 g/dL    Globulin 4.6 (H) 2.0 - 4.0 g/dL    A-G Ratio 0.7 (L) 1.1 - 2.2     URINALYSIS W/ REFLEX CULTURE    Collection Time: 03/20/23  8:02 PM    Specimen: Urine    Urine specimen   Result Value Ref Range    Color DARK YELLOW      Appearance CLOUDY (A) CLEAR      Specific gravity >1.030 (H) 1.003 - 1.030    pH (UA) 5.0 5.0 - 8.0      Protein 30 (A) NEG mg/dL    Glucose Negative NEG mg/dL    Ketone 15 (A) NEG mg/dL    Blood Negative NEG      Urobilinogen 1.0 0.2 - 1.0 EU/dL    Nitrites Negative NEG      Leukocyte Esterase Negative NEG      WBC 0-4 0 - 4 /hpf    RBC 0-5 0 - 5 /hpf    Epithelial cells MANY (A) FEW /lpf    Bacteria Negative NEG /hpf    UA:UC IF INDICATED CULTURE NOT INDICATED BY UA RESULT CNI     BILIRUBIN, CONFIRM    Collection Time: 03/20/23  8:02 PM   Result Value Ref Range    Bilirubin UA, confirm Negative     HCG URINE, QL. - POC    Collection Time: 03/20/23  8:08 PM   Result Value Ref Range    Pregnancy test,urine (POC) Negative NEG     BLOOD GAS,CHEM8,LACTIC ACID POC    Collection Time: 03/20/23 10:02 PM   Result Value Ref Range    pH, venous (POC) 7.36 7.32 - 7.42      pCO2, venous (POC) 53.1 (H) 41 - 51 MMHG    pO2, venous (POC) 38 25 - 40 mmHg    BICARBONATE 30 mmol/L    Base excess (POC) 3.3 mmol/L    O2 SAT 68 %    Sodium,  136 - 145 MMOL/L    Potassium, POC 4.1 3.5 - 5.5 MMOL/L    Chloride,  100 - 108 MMOL/L    CO2, POC 29 (H) 19 - 24 MMOL/L    Anion gap, POC 8 (L) 10 - 20      Glucose, POC 88 74 - 106 MG/DL    Creatinine, POC 0.4 (L) 0.6 - 1.3 MG/DL    eGFR (POC) >60 >60 ml/min/1.73m2    Calcium, ionized (POC) 1.16 1.12 - 1.32 mmol/L    Lactic Acid (POC) 0.77 0.40 - 2.00 mmol/L    Sample source VENOUS BLOOD     PROCALCITONIN    Collection Time: 03/20/23 10:38 PM   Result Value Ref Range    Procalcitonin 17.64 ng/mL   LIPASE    Collection Time: 03/20/23 10:38 PM   Result Value Ref Range    Lipase 27 (L) 73 - 084 U/L   METABOLIC PANEL, COMPREHENSIVE    Collection Time: 03/20/23 10:38 PM   Result Value Ref Range    Sodium 138 136 - 145 mmol/L    Potassium 3.5 3.5 - 5.1 mmol/L    Chloride 106 97 - 108 mmol/L    CO2 28 21 - 32 mmol/L    Anion gap 4 (L) 5 - 15 mmol/L    Glucose 89 65 - 100 mg/dL    BUN 9 6 - 20 MG/DL    Creatinine 0.58 0.55 - 1.02 MG/DL    BUN/Creatinine ratio 16 12 - 20      eGFR >60 >60 ml/min/1.73m2    Calcium 8.3 (L) 8.5 - 10.1 MG/DL Bilirubin, total 0.2 0.2 - 1.0 MG/DL    ALT (SGPT) 14 12 - 78 U/L    AST (SGOT) 12 (L) 15 - 37 U/L    Alk. phosphatase 84 45 - 117 U/L    Protein, total 7.2 6.4 - 8.2 g/dL    Albumin 3.0 (L) 3.5 - 5.0 g/dL    Globulin 4.2 (H) 2.0 - 4.0 g/dL    A-G Ratio 0.7 (L) 1.1 - 2.2     CULTURE, BLOOD    Collection Time: 03/21/23  4:40 AM    Specimen: Blood   Result Value Ref Range    Special Requests: NO SPECIAL REQUESTS      Culture result: NO GROWTH AFTER 2 HOURS     C REACTIVE PROTEIN, QT    Collection Time: 03/21/23  4:46 AM   Result Value Ref Range    C-Reactive protein 7.75 (H) 0.00 - 0.60 mg/dL   SED RATE (ESR)    Collection Time: 03/21/23  4:46 AM   Result Value Ref Range    Sed rate, automated 34 (H) 0 - 20 mm/hr   METABOLIC PANEL, BASIC    Collection Time: 03/21/23  4:46 AM   Result Value Ref Range    Sodium 138 136 - 145 mmol/L    Potassium 3.2 (L) 3.5 - 5.1 mmol/L    Chloride 107 97 - 108 mmol/L    CO2 27 21 - 32 mmol/L    Anion gap 4 (L) 5 - 15 mmol/L    Glucose 106 (H) 65 - 100 mg/dL    BUN 7 6 - 20 MG/DL    Creatinine 0.66 0.55 - 1.02 MG/DL    BUN/Creatinine ratio 11 (L) 12 - 20      eGFR >60 >60 ml/min/1.73m2    Calcium 7.8 (L) 8.5 - 10.1 MG/DL   CBC W/O DIFF    Collection Time: 03/21/23  4:46 AM   Result Value Ref Range    WBC 15.8 (H) 3.6 - 11.0 K/uL    RBC 3.57 (L) 3.80 - 5.20 M/uL    HGB 8.9 (L) 11.5 - 16.0 g/dL    HCT 29.0 (L) 35.0 - 47.0 %    MCV 81.2 80.0 - 99.0 FL    MCH 24.9 (L) 26.0 - 34.0 PG    MCHC 30.7 30.0 - 36.5 g/dL    RDW 15.0 (H) 11.5 - 14.5 %    PLATELET 138 879 - 403 K/uL    MPV 9.2 8.9 - 12.9 FL    NRBC 0.0 0  WBC    ABSOLUTE NRBC 0.00 0.00 - 0.01 K/uL   CULTURE, BLOOD    Collection Time: 03/21/23  4:55 AM    Specimen: Blood   Result Value Ref Range    Special Requests: NO SPECIAL REQUESTS      Culture result: NO GROWTH AFTER 2 HOURS           Assessment/Plan:     Active Problems:    Ul[facility]ative [facility] (3/21/2023)         See above narrative for full detail.     Janis Tobias, NP

## 2023-03-21 NOTE — DISCHARGE SUMMARY
Hospitalist Discharge Summary     Patient ID:  Bj Winn  [id number]  32 y.o.  1995  3/20/2023    PCP on record: None    Admit date: 3/20/2023  Discharge date and time: 3/21/2023    DISCHARGE DIAGNOSIS:    Assessment  Ulcerative colitis flare  Leukocytosis  Abdominal pain  Nausea vomiting diarrhea  Bloody diarrhea  Anemia  Dental caries    CONSULTATIONS:  IP CONSULT TO HOSPITALIST  IP CONSULT TO GASTROENTEROLOGY    Excerpted HPI from H&P of Bal Garcia DO:  Bj Winn is a 32 y.o.  female who presents with 26-year-old female with a past medical history of ulcerative colitis, priorly diagnosed via colonoscopy has Crohn's disease. Lately she states GI has told her it is ulcerative colitis. She was taking mesalamine in the past but stopped taking it because she feels it has not been helping her. She states she has had a flareup last of ulcerative colitis about 6 months ago. She states steroids helped. She states for the past week she has had severe abdominal pain and nausea vomiting as well as bloody diarrhea. She states she has numerous bowel movements a day. In addition she states that she has a cavity on her tooth, for which she has been taking large amounts of Tylenol and ibuprofen. She states she has been taking about 800 mg of ibuprofen every 4-6 hours for the past 4 days. Upon initial presentation to the ER, patient's vitals were unremarkable. Patient's labs were significant for a white count of 14, hemoglobin 9.7. Urine pregnancy test was unremarkable. CT abdomen showed findings consistent with inflammatory bowel disease.    ______________________________________________________________________  DISCHARGE SUMMARY/HOSPITAL COURSE:  for full details see H&P, daily progress notes, labs, consult notes.        Patient evaluated by GI, plan for colonoscopy but time TBD, patient stated that she had baby at home and she will not be able to stay, she will leave tonight anyway, risk and benefit explained to the patient patient she will already have an appointment with her gastroenterology doctor Nina Bird this week, send prednisone for 4 days, patient stated she is not taking mesalamine at home, recommended the patient to follow with her GI and PCP        _______________________________________________________________________  Patient seen and examined by me on discharge day. Pertinent Findings:  Gen:    Not in distress  Chest: Clear lungs  CVS:   Regular rhythm. No edema  Abd:  Soft, not distended, not tender  Neuro:  Alert,   _______________________________________________________________________  DISCHARGE MEDICATIONS:   Current Discharge Medication List        START taking these medications    Details   predniSONE (DELTASONE) 20 mg tablet Take 2 Tablets by mouth daily for 4 days. Qty: 8 Tablet, Refills: 0  Start date: 3/21/2023, End date: 3/25/2023           CONTINUE these medications which have NOT CHANGED    Details   mesalamine (LIALDA) 1.2 gram delayed release tablet Take 2 Tabs by mouth Daily (before breakfast). Qty: 40 Tab, Refills: 3           STOP taking these medications       pantoprazole (PROTONIX) 40 mg tablet Comments:   Reason for Stopping:                 Patient Follow Up Instructions: Activity: Activity as tolerated  Diet: Regular Diet  Wound Care: None needed    Follow-up with GI in 1 week.   Follow-up tests/labs     Follow-up Information       Follow up With Specialties Details Why Contact Info    None    None ([phone]) Patient stated that they have no PCP            ________________________________________________________________    Risk of deterioration: Moderate    Condition at Discharge:  Stable  __________________________________________________________________    Disposition  Home with family, no needs    ____________________________________________________________________    Code Status: Full Code  ___________________________________________________________________      Total time in minutes spent coordinating this discharge (includes going over instructions, follow-up, prescriptions, and preparing report for sign off to her PCP) :  35 minutes    Signed:   Nanine Meigs, MD

## 2023-03-21 NOTE — PROGRESS NOTES
End of Shift Note    Bedside shift change report given to Charles Churchill (oncoming nurse) by Ricco Martin (offgoing nurse). Report included the following information SBAR, Kardex, and MAR    Shift worked:  [id number]     Shift summary and any significant changes:     Pt is wanting to d/c talked to DR Antonio. Waiting for d/c order.      Concerns for physician to address:  See above     Zone phone for oncoming shift:   [phone]       Activity:  Activity Level: Up ad eileen  Number times ambulated in hallways past shift: 0  Number of times OOB to chair past shift: 1    Cardiac:   Cardiac Monitoring: No           Access:  Current line(s): PIV     Genitourinary:   Urinary status: voiding    Respiratory:   O2 Device: None (Room air)  Chronic home O2 use?: NO  Incentive spirometer at bedside: NO       GI:  Last Bowel Movement Date: 03/20/23  Current diet:  ADULT DIET Clear Liquid  Passing flatus: YES  Tolerating current diet: YES       Pain Management:   Patient states pain is manageable on current regimen: YES    Skin:  Angel Score: 21  Interventions: increase time out of bed    Patient Safety:  Fall Score:    Interventions: gripper socks       Length of Stay:  Expected LOS: - - -  Actual LOS: 0      Ricco Martin

## 2023-03-21 NOTE — ED NOTES
Assumed care of pt at this time. Pt a+o. C/o upper abdominal pain 8/10. Will medicate per emar. Full set of vitals obtained and reflected in emar.

## 2023-03-21 NOTE — H&P
Hospitalist Admission Note    NAME: Ester Garner   :  1995   MRN:  [id number]     Date/Time:  3/21/2023 3:45 AM    Patient PCP: None  ______________________________________________________________________  Given the patient's current clinical presentation, I have a high level of concern for decompensation if discharged from the emergency department. Complex decision making was performed, which includes reviewing the patient's available past medical records, laboratory results, and x-ray films. My assessment of this patient's clinical condition and my plan of care is as follows. Assessment / Plan:  Assessment  Ulcerative colitis flare  Leukocytosis  Abdominal pain  Nausea vomiting diarrhea  Bloody diarrhea  Anemia  Dental caries    Plan  - Etiology of patient's symptoms is believed to be a inflammatory bowel disease flare. She states symptoms are identical to prior flareups. - Obtain fecal calprotectin, CRP, ESR, and start steroids.  - She was taking large amounts of ibuprofen (800 mg q4 hr) prior to coming to the hospital.  Start IV PPI 40 BID pantoprazole due to bloody bowel movements. - Start DVT prophylaxis even though the patient is having bloody bowel movements, IBD is a hypercoagulable state and the patient is extremely high risk for DVT or PE per GDMT.  - Obtain respiratory viral panel to complete work-up for leukocytosis. Blood culture  - Zofran as needed  - Pain medication with morphine. She has an allergy to codeine but tolerated morphine in the ER.  - For patient's bloody diarrhea, obtain GI PCR testing. Rule put shigella, salmonella, yersinia, and campylobacter. Those organisms cause bloody diarrhea.   -Her anemia is unchanged from a year ago.   Suspect anemia of chronic disease  - GI consulted  -consider further abx  -consider dc on augmentin for dental caries, and dental followup        Code Status: FULL  Surrogate Decision Maker:    DVT Prophylaxis: Lovenox  GI Prophylaxis: not indicated    Baseline:       Subjective:   CHIEF COMPLAINT: abd pain    HISTORY OF PRESENT ILLNESS:     Gissel Simmons is a 32 y.o.  female who presents with 26-year-old female with a past medical history of ulcerative colitis, priorly diagnosed via colonoscopy has Crohn's disease. Lately she states GI has told her it is ulcerative colitis. She was taking mesalamine in the past but stopped taking it because she feels it has not been helping her. She states she has had a flareup last of ulcerative colitis about 6 months ago. She states steroids helped. She states for the past week she has had severe abdominal pain and nausea vomiting as well as bloody diarrhea. She states she has numerous bowel movements a day. In addition she states that she has a cavity on her tooth, for which she has been taking large amounts of Tylenol and ibuprofen. She states she has been taking about 800 mg of ibuprofen every 4-6 hours for the past 4 days. Upon initial presentation to the ER, patient's vitals were unremarkable. Patient's labs were significant for a white count of 14, hemoglobin 9.7. Urine pregnancy test was unremarkable. CT abdomen showed findings consistent with inflammatory bowel disease. We were asked to admit for work up and evaluation of the above problems.      Past Medical History:   Diagnosis Date    Anemia     receiving iron transfusions- last one 10/20/2017    Arthritis     Cancer ([facility] 2021    CERVICAL    COVID-19 virus infection 2021    H/O COVID 19 INFECTION 2021    Crohn's colitis ([facility] [address]     GERD (gastroesophageal reflux disease)     H/O  section     Herpes simplex virus (HSV) infection     just oral cold sores    Infertility, female     due to PCOS and chron's    Iron deficiency     BLOOD TRANSFUSION     PCOS (polycystic ovarian syndrome)     Placental abruption     Polycystic disease, ovaries     Rhesus isoimmunization affecting pregnancy Past Surgical History:   Procedure Laterality Date    HX BREAST AUGMENTATION Bilateral 10/26/2021    BILATERAL BREAST AUGMENTATION performed by Heather Whitehead MD at [facility] AM[facility]UL[facility] OR     [address]      x 2    HX [place]OS[place]  2014    [place]  2016     due to placental abruption;  demise       Social History     Tobacco Use    Smoking status: Former     Packs/day: 0.50     Years: 15.00     Pack years: 7.50     Types: Cigarettes    Smokeless tobacco: Never   Substance Use Topics    Alcohol use: Not Currently     Comment: socially        Family History   Problem Relation Age of Onset    Cancer Mother 32        Cervical    Migraines Mother     Psychiatric Disorder Mother     Cancer Maternal Grandmother         Cervical and ovarian    Hypertension Maternal Grandmother     Psychiatric Disorder Maternal Grandmother     Diabetes Maternal Grandfather     Hypertension Maternal Grandfather     Heart Disease Maternal Grandfather     Cancer Maternal Grandfather         Thyroid     Cancer Father         LIVER    Psychiatric Disorder Father     Cancer Sister         CERVICAL    Other Sister         LABILE BP - SYNCOPE    Anesth Problems Neg Hx      Allergies   Allergen Reactions    Codeine Anaphylaxis     Tolerates oxycodone    Tramadol Other (comments)     \"Blacked Out\", Hallucinations, 3 day Migraine    Flagyl [Metronidazole] Rash     RASH, ITCHING    Nsaids (Non-Steroidal Anti-Inflammatory Drug) Other (comments)     Hx crohns disease         Prior to Admission medications    Medication Sig Start Date End Date Taking? Authorizing Provider   pantoprazole (PROTONIX) 40 mg tablet Take 1 Tab by mouth daily. Patient not taking: Reported on 10/5/2021 7/20/17   MD jie Annalamdawit Gomez) 1.2 gram delayed release tablet Take 2 Tabs by mouth Daily (before breakfast). Patient taking differently: Take 2.4 g by mouth daily as needed.  17   Rosendo Robles MD REVIEW OF SYSTEMS:     I am not able to complete the review of systems because: The patient is intubated and sedated    The patient has altered mental status due to his acute medical problems    The patient has baseline aphasia from prior stroke(s)    The patient has baseline dementia and is not reliable historian    The patient is in acute medical distress and unable to provide information           Total of 12 systems reviewed as follows:       POSITIVE= underlined text  Negative = text not underlined  General:  fever, chills, sweats, generalized weakness, weight loss/gain,      loss of appetite   Eyes:    blurred vision, eye pain, loss of vision, double vision  ENT:    rhinorrhea, pharyngitis   Respiratory:   cough, sputum production, SOB, WOLF, wheezing, pleuritic pain   Cardiology:   chest pain, palpitations, orthopnea, PND, edema, syncope   Gastrointestinal:  abdominal pain , N/V, diarrhea, dysphagia, constipation, bleeding   Genitourinary:  frequency, urgency, dysuria, hematuria, incontinence   Muskuloskeletal :  arthralgia, myalgia, back pain  Hematology:  easy bruising, nose or gum bleeding, lymphadenopathy   Dermatological: rash, ulceration, pruritis, color change / jaundice  Endocrine:   hot flashes or polydipsia   Neurological:  headache, dizziness, confusion, focal weakness, paresthesia,     Speech difficulties, memory loss, gait difficulty  Psychological: Feelings of anxiety, depression, agitation    Objective:   VITALS:    Visit Vitals  BP (!) 114/55 (BP 1 Location: Left upper arm, BP Patient Position: At rest)   Pulse 85   Temp 99.4 °F (37.4 °C)   Resp 18   Ht 5' 1\" (1.549 m)   Wt 65.5 kg (144 lb 6.4 oz)   SpO2 97%   BMI 27.28 kg/m²       PHYSICAL EXAM:    General:    Alert, cooperative, no distress, appears stated age.      HEENT: Atraumatic, anicteric sclerae, pink conjunctivae     No oral ulcers, mucosa moist, throat clear, dentition fair  Neck:  Supple, symmetrical,  thyroid: non tender  Lungs:   Clear to auscultation bilaterally. No Wheezing or Rhonchi. No rales. Chest wall:  No tenderness  No Accessory muscle use. Heart:   Regular  rhythm,  No  murmur   No edema  Abdomen:   Soft, non-tender. Not distended. Bowel sounds normal  Extremities: No cyanosis. No clubbing,      Skin turgor normal, Capillary refill normal, Radial dial pulse 2+  Skin:     Not pale. Not Jaundiced  No rashes   Psych:  Good insight. Not depressed. Not anxious or agitated. Neurologic: EOMs intact. No facial asymmetry. No aphasia or slurred speech. Symmetrical strength, Sensation grossly intact. Alert and oriented X 4.     _______________________________________________________________________  Care Plan discussed with:    Comments   Patient     Family      RN     Care Manager                    Consultant:      _______________________________________________________________________  Expected  Disposition:   Home with Family    HH/PT/OT/RN    SNF/LTC    KENZIE    ________________________________________________________________________  TOTAL TIME:  76 Minutes    Critical Care Provided     Minutes non procedure based      Comments     Reviewed previous records   >50% of visit spent in counseling and coordination of care  Discussion with patient and/or family and questions answered       ________________________________________________________________________  Signed: Duard Matter, DO    Procedures: see electronic medical records for all procedures/Xrays and details which were not copied into this note but were reviewed prior to creation of Plan.     LAB DATA REVIEWED:    Recent Results (from the past 24 hour(s))   CBC WITH AUTOMATED DIFF    Collection Time: 03/20/23  8:02 PM   Result Value Ref Range    WBC 14.0 (H) 3.6 - 11.0 K/uL    RBC 3.80 3.80 - 5.20 M/uL    HGB 9.7 (L) 11.5 - 16.0 g/dL    HCT 31.0 (L) 35.0 - 47.0 %    MCV 81.6 80.0 - 99.0 FL    MCH 25.5 (L) 26.0 - 34.0 PG    MCHC 31.3 30.0 - 36.5 g/dL RDW 15.0 (H) 11.5 - 14.5 %    PLATELET 496 962 - 411 K/uL    MPV 9.4 8.9 - 12.9 FL    NRBC 0.0 0  WBC    ABSOLUTE NRBC 0.00 0.00 - 0.01 K/uL    NEUTROPHILS 86 (H) 32 - 75 %    LYMPHOCYTES 8 (L) 12 - 49 %    MONOCYTES 6 5 - 13 %    EOSINOPHILS 0 0 - 7 %    BASOPHILS 0 0 - 1 %    IMMATURE GRANULOCYTES 0 0.0 - 0.5 %    ABS. NEUTROPHILS 12.0 (H) 1.8 - 8.0 K/UL    ABS. LYMPHOCYTES 1.2 0.8 - 3.5 K/UL    ABS. MONOCYTES 0.8 0.0 - 1.0 K/UL    ABS. EOSINOPHILS 0.0 0.0 - 0.4 K/UL    ABS. BASOPHILS 0.0 0.0 - 0.1 K/UL    ABS. IMM. GRANS. 0.0 0.00 - 0.04 K/UL    DF AUTOMATED     METABOLIC PANEL, COMPREHENSIVE    Collection Time: 03/20/23  8:02 PM   Result Value Ref Range    Sodium 137 136 - 145 mmol/L    Potassium 3.4 (L) 3.5 - 5.1 mmol/L    Chloride 105 97 - 108 mmol/L    CO2 28 21 - 32 mmol/L    Anion gap 4 (L) 5 - 15 mmol/L    Glucose 95 65 - 100 mg/dL    BUN 9 6 - 20 MG/DL    Creatinine 0.64 0.55 - 1.02 MG/DL    BUN/Creatinine ratio 14 12 - 20      eGFR >60 >60 ml/min/1.73m2    Calcium 8.7 8.5 - 10.1 MG/DL    Bilirubin, total 0.7 0.2 - 1.0 MG/DL    ALT (SGPT) 15 12 - 78 U/L    AST (SGOT) 18 15 - 37 U/L    Alk.  phosphatase 91 45 - 117 U/L    Protein, total 7.9 6.4 - 8.2 g/dL    Albumin 3.3 (L) 3.5 - 5.0 g/dL    Globulin 4.6 (H) 2.0 - 4.0 g/dL    A-G Ratio 0.7 (L) 1.1 - 2.2     URINALYSIS W/ REFLEX CULTURE    Collection Time: 03/20/23  8:02 PM    Specimen: Urine    Urine specimen   Result Value Ref Range    Color DARK YELLOW      Appearance CLOUDY (A) CLEAR      Specific gravity >1.030 (H) 1.003 - 1.030    pH (UA) 5.0 5.0 - 8.0      Protein 30 (A) NEG mg/dL    Glucose Negative NEG mg/dL    Ketone 15 (A) NEG mg/dL    Blood Negative NEG      Urobilinogen 1.0 0.2 - 1.0 EU/dL    Nitrites Negative NEG      Leukocyte Esterase Negative NEG      WBC 0-4 0 - 4 /hpf    RBC 0-5 0 - 5 /hpf    Epithelial cells MANY (A) FEW /lpf    Bacteria Negative NEG /hpf    UA:UC IF INDICATED CULTURE NOT INDICATED BY UA RESULT CNI     BILIRUBIN, CONFIRM    Collection Time: 03/20/23  8:02 PM   Result Value Ref Range    Bilirubin UA, confirm Negative     HCG URINE, QL. - POC    Collection Time: 03/20/23  8:08 PM   Result Value Ref Range    Pregnancy test,urine (POC) Negative NEG     BLOOD GAS,CHEM8,LACTIC ACID POC    Collection Time: 03/20/23 10:02 PM   Result Value Ref Range    pH, venous (POC) 7.36 7.32 - 7.42      pCO2, venous (POC) 53.1 (H) 41 - 51 MMHG    pO2, venous (POC) 38 25 - 40 mmHg    BICARBONATE 30 mmol/L    Base excess (POC) 3.3 mmol/L    O2 SAT 68 %    Sodium,  136 - 145 MMOL/L    Potassium, POC 4.1 3.5 - 5.5 MMOL/L    Chloride,  100 - 108 MMOL/L    CO2, POC 29 (H) 19 - 24 MMOL/L    Anion gap, POC 8 (L) 10 - 20      Glucose, POC 88 74 - 106 MG/DL    Creatinine, POC 0.4 (L) 0.6 - 1.3 MG/DL    eGFR (POC) >60 >60 ml/min/1.73m2    Calcium, ionized (POC) 1.16 1.12 - 1.32 mmol/L    Lactic Acid (POC) 0.77 0.40 - 2.00 mmol/L    Sample source VENOUS BLOOD     PROCALCITONIN    Collection Time: 03/20/23 10:38 PM   Result Value Ref Range    Procalcitonin 17.64 ng/mL   LIPASE    Collection Time: 03/20/23 10:38 PM   Result Value Ref Range    Lipase 27 (L) 73 - 767 U/L   METABOLIC PANEL, COMPREHENSIVE    Collection Time: 03/20/23 10:38 PM   Result Value Ref Range    Sodium 138 136 - 145 mmol/L    Potassium 3.5 3.5 - 5.1 mmol/L    Chloride 106 97 - 108 mmol/L    CO2 28 21 - 32 mmol/L    Anion gap 4 (L) 5 - 15 mmol/L    Glucose 89 65 - 100 mg/dL    BUN 9 6 - 20 MG/DL    Creatinine 0.58 0.55 - 1.02 MG/DL    BUN/Creatinine ratio 16 12 - 20      eGFR >60 >60 ml/min/1.73m2    Calcium 8.3 (L) 8.5 - 10.1 MG/DL    Bilirubin, total 0.2 0.2 - 1.0 MG/DL    ALT (SGPT) 14 12 - 78 U/L    AST (SGOT) 12 (L) 15 - 37 U/L    Alk.  phosphatase 84 45 - 117 U/L    Protein, total 7.2 6.4 - 8.2 g/dL    Albumin 3.0 (L) 3.5 - 5.0 g/dL    Globulin 4.2 (H) 2.0 - 4.0 g/dL    A-G Ratio 0.7 (L) 1.1 - 2.2

## 2023-03-22 NOTE — PROGRESS NOTES
Spoke with Sumit Faulkner NP regarding d/c of patient. Dr Wilmar Mrainelli put in d/c instructions but no order. Elizabeth Bravo sent a message to Dr Wilmar Marinelli and she stateed that she had already done everything and the patient was d/c. Verbal order placed for d/c home. 2010: Pt given d/c instructions, follow up appt, and medication teaching. Pt taken by WC to main entrance for d/c home.

## 2023-03-22 NOTE — PROGRESS NOTES
Transition of Care Plan:   OBSERVATION    RUR: OBS GLOS:   LOS:      Disposition:   Home with follow up   If SNF or IPR: Date FOC offered:  Date FOC received:  Date authorization started with reference number:  Date authorization received and expires:  Accepting facility:  Follow up appointments:  PCP  DME needed:   None  Transportation at Discharge:  Friend  [place] or means to access home:      Friend  [place] Medicare Letter:  N/A   State OBS letter  Is patient a [place] and connected with the [place]? If yes, was [employer] transfer form completed and [facility] notified? Caregiver Contact: Leora Franklin [phone]  Discharge Caregiver contacted prior to discharge? Friend at bedside  Care Conference needed?:               no    Care Management Interventions  PCP Verified by CM: Yes  Palliative Care Criteria Met (RRAT>21 & CHF Dx)?: No  Transition of Care Consult (CM Consult): Discharge Planning (OBS)  MyChart Signup: No  Discharge Durable Medical Equipment: No  Health Maintenance Reviewed: Yes  Physical Therapy Consult: No  Occupational Therapy Consult: No  Speech Therapy Consult: No  Support Systems: Spouse/Significant Other Sergio Hrarington/friend at bedside, verified emergency contact)  Confirm Follow Up Transport: Family  Discharge Location  Patient Expects to be Discharged to[de-identified] Home with family assistance (Discharging home)     [place] Outpatient Observation Notice ([facility] [place]) provided to patient/representative with verbal explanation of the notice. Copy placed on bedside chart. Isolation/verbal        CM will continue to follow, provide support and assist with transition of care needs.        Artemio Mi  Management  [phone]/Available on [employer]

## 2023-05-12 RX ORDER — MESALAMINE 1.2 G/1
TABLET, DELAYED RELEASE ORAL
COMMUNITY
Start: 2017-07-20

## 2023-10-24 NOTE — PROGRESS NOTES
Flavia Brand is a 29 y.o. female here for new patient appt for SALINA. Referred by [facility]   She has appt in February with gastroenterology. Pt states she has been spotting everyday since receiving IUD in June with random days of heavy bleeding. She has had blood and iron transfusions in the past. Last blood transfusion was in 2018. Iron transfusion in 2019. May have had one since then but not sure. She cannot tolerate OTC iron. 1. Have you been to the ER, urgent care clinic since your last visit? Hospitalized since your last visit? New Pt    2. Have you seen or consulted any other health care providers outside of the [address] [place] [address] since your last visit? Include any pap smears or colon screening.   New Pt

## 2023-10-27 ENCOUNTER — OFFICE VISIT (OUTPATIENT)
Age: 28
End: 2023-10-27
Payer: MEDICAID

## 2023-10-27 VITALS
HEART RATE: 73 BPM | SYSTOLIC BLOOD PRESSURE: 116 MMHG | TEMPERATURE: 98.1 F | WEIGHT: 136.6 LBS | DIASTOLIC BLOOD PRESSURE: 73 MMHG | OXYGEN SATURATION: 95 % | HEIGHT: 61 IN | BODY MASS INDEX: 25.79 KG/M2

## 2023-10-27 DIAGNOSIS — D50.0 IRON DEFICIENCY ANEMIA DUE TO CHRONIC BLOOD LOSS: ICD-10-CM

## 2023-10-27 DIAGNOSIS — D50.0 IRON DEFICIENCY ANEMIA DUE TO CHRONIC BLOOD LOSS: Primary | ICD-10-CM

## 2023-10-27 PROCEDURE — 99204 OFFICE O/P NEW MOD 45 MIN: CPT | Performed by: INTERNAL MEDICINE

## 2023-10-27 RX ORDER — DIPHENHYDRAMINE HYDROCHLORIDE 50 MG/ML
50 INJECTION INTRAMUSCULAR; INTRAVENOUS
OUTPATIENT
Start: 2023-11-03

## 2023-10-27 RX ORDER — SODIUM CHLORIDE 9 MG/ML
INJECTION, SOLUTION INTRAVENOUS CONTINUOUS
OUTPATIENT
Start: 2023-11-03

## 2023-10-27 RX ORDER — FAMOTIDINE 10 MG/ML
20 INJECTION, SOLUTION INTRAVENOUS
OUTPATIENT
Start: 2023-11-03

## 2023-10-27 RX ORDER — ONDANSETRON 2 MG/ML
8 INJECTION INTRAMUSCULAR; INTRAVENOUS
OUTPATIENT
Start: 2023-11-03

## 2023-10-27 RX ORDER — SODIUM CHLORIDE 0.9 % (FLUSH) 0.9 %
5-40 SYRINGE (ML) INJECTION PRN
OUTPATIENT
Start: 2023-11-03

## 2023-10-27 RX ORDER — SODIUM CHLORIDE 9 MG/ML
5-250 INJECTION, SOLUTION INTRAVENOUS PRN
OUTPATIENT
Start: 2023-11-03

## 2023-10-27 RX ORDER — ACETAMINOPHEN 325 MG/1
650 TABLET ORAL
OUTPATIENT
Start: 2023-11-03

## 2023-10-27 RX ORDER — EPINEPHRINE 1 MG/ML
0.3 INJECTION, SOLUTION, CONCENTRATE INTRAVENOUS PRN
OUTPATIENT
Start: 2023-11-03

## 2023-10-27 RX ORDER — HEPARIN SODIUM (PORCINE) LOCK FLUSH IV SOLN 100 UNIT/ML 100 UNIT/ML
500 SOLUTION INTRAVENOUS PRN
OUTPATIENT
Start: 2023-11-03

## 2023-10-27 RX ORDER — ALBUTEROL SULFATE 90 UG/1
4 AEROSOL, METERED RESPIRATORY (INHALATION) PRN
OUTPATIENT
Start: 2023-11-03

## 2023-10-27 NOTE — PROGRESS NOTES
Please see media for labs, drawn 9/2023. Pt can not tolerate oral iron. Has received blood and iron in the past.  Will submit for venofer. Made pt aware we may need updated labs if mediciad requires it to be within 30 days. If updated labs are not needed until after iron transfusions.

## 2023-10-27 NOTE — PROGRESS NOTES
Darlene  at [facility], [address], [facility], [address] [place], [place] [address]  [phone]    Hematology Note        Patient: Flavia Brand MRN: [id number]  SSN: [id number]    YOB: 1995  Age: 29 y.o. Sex: female      Subjective:      Flavia Brand is a 29 y.o. female who I am seeing in consultation for iron deficiency anemia. She has suffered with iron deficiency anemia since she reached the age of menarche. She feels fatigued. Denies active rectal bleeding. He does not tolerate oral iron supplement due to constipation and nausea. She has received IV iron approximately 4 yrs ago.        Review of Systems:  Constitutional: fatigue  Eyes: negative  Ears, Nose, Mouth, Throat, and Face: negative  Respiratory: negative  Cardiovascular: negative  Gastrointestinal: negative  Genitourinary:negative  Integument/Breast: negative  Hematologic/Lymphatic: negative  Musculoskeletal:negative  Neurological: negative      Past Medical History:   Diagnosis Date    Anemia     receiving iron transfusions- last one 10/20/2017    Arthritis     Cancer ([address]) 2021    CERVICAL    COVID-19 virus infection 2021    H/O COVID 19 INFECTION 2021    Crohn's colitis ([address])     GERD (gastroesophageal reflux disease)     H/O  section     Herpes simplex virus (HSV) infection     just oral cold sores    Infertility, female     due to PCOS and chron's    Iron deficiency     BLOOD TRANSFUSION     PCOS (polycystic ovarian syndrome)     Placental abruption     Polycystic disease, ovaries     Rhesus isoimmunization affecting pregnancy      Past Surgical History:   Procedure Laterality Date    BREAST SURGERY Bilateral 10/26/2021    BILATERAL BREAST AUGMENTATION performed by Hardeep Chiu MD at [address]  2016     due to placental abruption;  demise     SECTION      x 2

## 2023-11-16 ENCOUNTER — TELEPHONE (OUTPATIENT)
Age: 28
End: 2023-11-16

## 2023-11-16 NOTE — TELEPHONE ENCOUNTER
Pt called she thought her infusion was at 2. They just called her saying it was at 6. She couldn't get ahold Susan Gauthier the location it was at. She said it was no way she could make it in time.

## 2023-11-16 NOTE — TELEPHONE ENCOUNTER
Spoke with Edna Desai and pt could come at 130 or 230 today. Called pt. HIPAA verified by two patient identifiers. She will likely be able to do 230. She thought it would be cancelled all together so she quickly made another arrangement but she will call and get that cancelled to be able to come to infusion at 230. I provided her the number to [facility] if won't be able to do so today.

## 2023-11-22 ENCOUNTER — HOSPITAL ENCOUNTER (OUTPATIENT)
Facility: HOSPITAL | Age: 28
Setting detail: INFUSION SERIES
Discharge: HOME | End: 2023-11-22
Payer: MEDICAID

## 2023-11-22 VITALS
HEART RATE: 83 BPM | SYSTOLIC BLOOD PRESSURE: 102 MMHG | RESPIRATION RATE: 18 BRPM | DIASTOLIC BLOOD PRESSURE: 71 MMHG | TEMPERATURE: 99.1 F

## 2023-11-22 DIAGNOSIS — D50.0 IRON DEFICIENCY ANEMIA DUE TO CHRONIC BLOOD LOSS: Primary | ICD-10-CM

## 2023-11-22 PROCEDURE — 2580000003 HC RX 258

## 2023-11-22 PROCEDURE — 6360000002 HC RX W HCPCS: Performed by: INTERNAL MEDICINE

## 2023-11-22 PROCEDURE — 96374 THER/PROPH/DIAG INJ IV PUSH: CPT

## 2023-11-22 RX ORDER — ACETAMINOPHEN 325 MG/1
650 TABLET ORAL
Status: DISCONTINUED | OUTPATIENT
Start: 2023-11-22 | End: 2023-11-23 | Stop reason: HOSPADM

## 2023-11-22 RX ORDER — ONDANSETRON 2 MG/ML
8 INJECTION INTRAMUSCULAR; INTRAVENOUS
Status: CANCELLED | OUTPATIENT
Start: 2023-12-11

## 2023-11-22 RX ORDER — ALBUTEROL SULFATE 90 UG/1
4 AEROSOL, METERED RESPIRATORY (INHALATION) PRN
Status: CANCELLED | OUTPATIENT
Start: 2023-12-11

## 2023-11-22 RX ORDER — SODIUM CHLORIDE 9 MG/ML
INJECTION, SOLUTION INTRAVENOUS CONTINUOUS
Status: CANCELLED | OUTPATIENT
Start: 2023-12-11

## 2023-11-22 RX ORDER — HEPARIN 100 UNIT/ML
500 SYRINGE INTRAVENOUS PRN
Status: CANCELLED | OUTPATIENT
Start: 2023-12-11

## 2023-11-22 RX ORDER — SODIUM CHLORIDE 0.9 % (FLUSH) 0.9 %
5-40 SYRINGE (ML) INJECTION PRN
Status: CANCELLED | OUTPATIENT
Start: 2023-12-11

## 2023-11-22 RX ORDER — ALBUTEROL SULFATE 90 UG/1
4 AEROSOL, METERED RESPIRATORY (INHALATION) PRN
Status: DISCONTINUED | OUTPATIENT
Start: 2023-11-22 | End: 2023-11-23 | Stop reason: HOSPADM

## 2023-11-22 RX ORDER — EPINEPHRINE 1 MG/ML
0.3 INJECTION, SOLUTION, CONCENTRATE INTRAVENOUS PRN
Status: CANCELLED | OUTPATIENT
Start: 2023-12-11

## 2023-11-22 RX ORDER — DIPHENHYDRAMINE HYDROCHLORIDE 50 MG/ML
50 INJECTION INTRAMUSCULAR; INTRAVENOUS
Status: CANCELLED | OUTPATIENT
Start: 2023-12-11

## 2023-11-22 RX ORDER — ONDANSETRON 2 MG/ML
8 INJECTION INTRAMUSCULAR; INTRAVENOUS
Status: DISCONTINUED | OUTPATIENT
Start: 2023-11-22 | End: 2023-11-23 | Stop reason: HOSPADM

## 2023-11-22 RX ORDER — SODIUM CHLORIDE 0.9 % (FLUSH) 0.9 %
5-40 SYRINGE (ML) INJECTION PRN
Status: DISCONTINUED | OUTPATIENT
Start: 2023-11-22 | End: 2023-11-23 | Stop reason: HOSPADM

## 2023-11-22 RX ORDER — SODIUM CHLORIDE 9 MG/ML
INJECTION, SOLUTION INTRAVENOUS CONTINUOUS
Status: DISCONTINUED | OUTPATIENT
Start: 2023-11-22 | End: 2023-11-23 | Stop reason: HOSPADM

## 2023-11-22 RX ORDER — DIPHENHYDRAMINE HYDROCHLORIDE 50 MG/ML
50 INJECTION INTRAMUSCULAR; INTRAVENOUS
Status: DISCONTINUED | OUTPATIENT
Start: 2023-11-22 | End: 2023-11-23 | Stop reason: HOSPADM

## 2023-11-22 RX ORDER — EPINEPHRINE 1 MG/ML
0.3 INJECTION, SOLUTION, CONCENTRATE INTRAVENOUS PRN
Status: DISCONTINUED | OUTPATIENT
Start: 2023-11-22 | End: 2023-11-23 | Stop reason: HOSPADM

## 2023-11-22 RX ORDER — SODIUM CHLORIDE 9 MG/ML
5-250 INJECTION, SOLUTION INTRAVENOUS PRN
Status: CANCELLED | OUTPATIENT
Start: 2023-12-11

## 2023-11-22 RX ORDER — ACETAMINOPHEN 325 MG/1
650 TABLET ORAL
Status: CANCELLED | OUTPATIENT
Start: 2023-12-11

## 2023-11-22 RX ORDER — SODIUM CHLORIDE 9 MG/ML
5-250 INJECTION, SOLUTION INTRAVENOUS PRN
Status: DISCONTINUED | OUTPATIENT
Start: 2023-11-22 | End: 2023-11-23 | Stop reason: HOSPADM

## 2023-11-22 RX ADMIN — IRON SUCROSE 200 MG: 20 INJECTION, SOLUTION INTRAVENOUS at 15:22

## 2023-11-22 RX ADMIN — SODIUM CHLORIDE 25 ML/HR: 9 INJECTION, SOLUTION INTRAVENOUS at 15:21

## 2023-11-22 ASSESSMENT — PAIN SCALES - GENERAL: PAINLEVEL_OUTOF10: 0

## 2023-12-01 ENCOUNTER — HOSPITAL ENCOUNTER (OUTPATIENT)
Facility: HOSPITAL | Age: 28
Setting detail: INFUSION SERIES
Discharge: HOME | End: 2023-12-01
Payer: MEDICAID

## 2023-12-01 VITALS
TEMPERATURE: 98.5 F | DIASTOLIC BLOOD PRESSURE: 72 MMHG | SYSTOLIC BLOOD PRESSURE: 113 MMHG | RESPIRATION RATE: 16 BRPM | HEART RATE: 80 BPM | OXYGEN SATURATION: 97 %

## 2023-12-01 DIAGNOSIS — D50.0 IRON DEFICIENCY ANEMIA DUE TO CHRONIC BLOOD LOSS: Primary | ICD-10-CM

## 2023-12-01 PROCEDURE — 6360000002 HC RX W HCPCS: Performed by: INTERNAL MEDICINE

## 2023-12-01 PROCEDURE — 96374 THER/PROPH/DIAG INJ IV PUSH: CPT

## 2023-12-01 PROCEDURE — 2580000003 HC RX 258

## 2023-12-01 RX ORDER — ALBUTEROL SULFATE 90 UG/1
4 AEROSOL, METERED RESPIRATORY (INHALATION) PRN
OUTPATIENT
Start: 2023-12-06

## 2023-12-01 RX ORDER — SODIUM CHLORIDE 9 MG/ML
5-250 INJECTION, SOLUTION INTRAVENOUS PRN
OUTPATIENT
Start: 2023-12-06

## 2023-12-01 RX ORDER — SODIUM CHLORIDE 0.9 % (FLUSH) 0.9 %
5-40 SYRINGE (ML) INJECTION PRN
Status: DISCONTINUED | OUTPATIENT
Start: 2023-12-01 | End: 2023-12-02 | Stop reason: HOSPADM

## 2023-12-01 RX ORDER — SODIUM CHLORIDE 0.9 % (FLUSH) 0.9 %
5-40 SYRINGE (ML) INJECTION PRN
Status: CANCELLED | OUTPATIENT
Start: 2023-12-06

## 2023-12-01 RX ORDER — DIPHENHYDRAMINE HYDROCHLORIDE 50 MG/ML
50 INJECTION INTRAMUSCULAR; INTRAVENOUS
OUTPATIENT
Start: 2023-12-06

## 2023-12-01 RX ORDER — SODIUM CHLORIDE 9 MG/ML
INJECTION, SOLUTION INTRAVENOUS CONTINUOUS
OUTPATIENT
Start: 2023-12-06

## 2023-12-01 RX ORDER — EPINEPHRINE 1 MG/ML
0.3 INJECTION, SOLUTION INTRAMUSCULAR; SUBCUTANEOUS PRN
OUTPATIENT
Start: 2023-12-06

## 2023-12-01 RX ORDER — ONDANSETRON 2 MG/ML
8 INJECTION INTRAMUSCULAR; INTRAVENOUS
OUTPATIENT
Start: 2023-12-06

## 2023-12-01 RX ORDER — HEPARIN 100 UNIT/ML
500 SYRINGE INTRAVENOUS PRN
OUTPATIENT
Start: 2023-12-06

## 2023-12-01 RX ORDER — SODIUM CHLORIDE 9 MG/ML
5-250 INJECTION, SOLUTION INTRAVENOUS PRN
Status: CANCELLED | OUTPATIENT
Start: 2023-12-06

## 2023-12-01 RX ORDER — ACETAMINOPHEN 325 MG/1
650 TABLET ORAL
OUTPATIENT
Start: 2023-12-06

## 2023-12-01 RX ORDER — SODIUM CHLORIDE 9 MG/ML
5-250 INJECTION, SOLUTION INTRAVENOUS PRN
Status: DISCONTINUED | OUTPATIENT
Start: 2023-12-01 | End: 2023-12-02 | Stop reason: HOSPADM

## 2023-12-01 RX ADMIN — SODIUM CHLORIDE, PRESERVATIVE FREE 10 ML: 5 INJECTION INTRAVENOUS at 15:26

## 2023-12-01 RX ADMIN — SODIUM CHLORIDE 25 ML/HR: 9 INJECTION, SOLUTION INTRAVENOUS at 15:29

## 2023-12-01 RX ADMIN — IRON SUCROSE 200 MG: 20 INJECTION, SOLUTION INTRAVENOUS at 15:30

## 2023-12-01 ASSESSMENT — PAIN SCALES - GENERAL: PAINLEVEL_OUTOF10: 6

## 2023-12-01 ASSESSMENT — PAIN DESCRIPTION - LOCATION: LOCATION: BACK

## 2023-12-01 ASSESSMENT — PAIN DESCRIPTION - ORIENTATION: ORIENTATION: LOWER

## 2023-12-01 ASSESSMENT — PAIN DESCRIPTION - DESCRIPTORS: DESCRIPTORS: ACHING

## 2023-12-08 ENCOUNTER — HOSPITAL ENCOUNTER (OUTPATIENT)
Facility: HOSPITAL | Age: 28
Setting detail: INFUSION SERIES
Discharge: HOME | End: 2023-12-08
Payer: MEDICAID

## 2023-12-08 VITALS
TEMPERATURE: 98.4 F | DIASTOLIC BLOOD PRESSURE: 65 MMHG | OXYGEN SATURATION: 96 % | SYSTOLIC BLOOD PRESSURE: 117 MMHG | RESPIRATION RATE: 18 BRPM | HEART RATE: 65 BPM

## 2023-12-08 DIAGNOSIS — D50.0 IRON DEFICIENCY ANEMIA DUE TO CHRONIC BLOOD LOSS: Primary | ICD-10-CM

## 2023-12-08 PROCEDURE — 96374 THER/PROPH/DIAG INJ IV PUSH: CPT

## 2023-12-08 PROCEDURE — 2580000003 HC RX 258

## 2023-12-08 PROCEDURE — 6360000002 HC RX W HCPCS: Performed by: INTERNAL MEDICINE

## 2023-12-08 RX ORDER — SODIUM CHLORIDE 9 MG/ML
5-250 INJECTION, SOLUTION INTRAVENOUS PRN
OUTPATIENT
Start: 2023-12-15

## 2023-12-08 RX ORDER — HEPARIN 100 UNIT/ML
500 SYRINGE INTRAVENOUS PRN
OUTPATIENT
Start: 2023-12-15

## 2023-12-08 RX ORDER — SODIUM CHLORIDE 9 MG/ML
5-250 INJECTION, SOLUTION INTRAVENOUS PRN
Status: DISCONTINUED | OUTPATIENT
Start: 2023-12-08 | End: 2023-12-09 | Stop reason: HOSPADM

## 2023-12-08 RX ORDER — DIPHENHYDRAMINE HYDROCHLORIDE 50 MG/ML
50 INJECTION INTRAMUSCULAR; INTRAVENOUS
OUTPATIENT
Start: 2023-12-15

## 2023-12-08 RX ORDER — ONDANSETRON 2 MG/ML
8 INJECTION INTRAMUSCULAR; INTRAVENOUS
OUTPATIENT
Start: 2023-12-15

## 2023-12-08 RX ORDER — ALBUTEROL SULFATE 90 UG/1
4 AEROSOL, METERED RESPIRATORY (INHALATION) PRN
OUTPATIENT
Start: 2023-12-15

## 2023-12-08 RX ORDER — ACETAMINOPHEN 325 MG/1
650 TABLET ORAL
OUTPATIENT
Start: 2023-12-15

## 2023-12-08 RX ORDER — SODIUM CHLORIDE 0.9 % (FLUSH) 0.9 %
5-40 SYRINGE (ML) INJECTION PRN
Status: DISCONTINUED | OUTPATIENT
Start: 2023-12-08 | End: 2023-12-09 | Stop reason: HOSPADM

## 2023-12-08 RX ORDER — SODIUM CHLORIDE 9 MG/ML
INJECTION, SOLUTION INTRAVENOUS CONTINUOUS
OUTPATIENT
Start: 2023-12-15

## 2023-12-08 RX ORDER — EPINEPHRINE 1 MG/ML
0.3 INJECTION, SOLUTION INTRAMUSCULAR; SUBCUTANEOUS PRN
OUTPATIENT
Start: 2023-12-15

## 2023-12-08 RX ORDER — SODIUM CHLORIDE 0.9 % (FLUSH) 0.9 %
5-40 SYRINGE (ML) INJECTION PRN
OUTPATIENT
Start: 2023-12-15

## 2023-12-08 RX ADMIN — SODIUM CHLORIDE 25 ML/HR: 9 INJECTION, SOLUTION INTRAVENOUS at 15:48

## 2023-12-08 RX ADMIN — IRON SUCROSE 200 MG: 20 INJECTION, SOLUTION INTRAVENOUS at 15:49

## 2023-12-22 ENCOUNTER — HOSPITAL ENCOUNTER (OUTPATIENT)
Facility: HOSPITAL | Age: 28
Setting detail: INFUSION SERIES
Discharge: HOME | End: 2023-12-22
Payer: MEDICAID

## 2023-12-22 VITALS
RESPIRATION RATE: 16 BRPM | SYSTOLIC BLOOD PRESSURE: 103 MMHG | TEMPERATURE: 97.9 F | OXYGEN SATURATION: 99 % | DIASTOLIC BLOOD PRESSURE: 61 MMHG | HEART RATE: 84 BPM

## 2023-12-22 DIAGNOSIS — D50.0 IRON DEFICIENCY ANEMIA DUE TO CHRONIC BLOOD LOSS: Primary | ICD-10-CM

## 2023-12-22 PROCEDURE — 96374 THER/PROPH/DIAG INJ IV PUSH: CPT

## 2023-12-22 PROCEDURE — 2580000003 HC RX 258

## 2023-12-22 PROCEDURE — 6360000002 HC RX W HCPCS: Performed by: INTERNAL MEDICINE

## 2023-12-22 RX ORDER — SODIUM CHLORIDE 9 MG/ML
INJECTION, SOLUTION INTRAVENOUS CONTINUOUS
OUTPATIENT
Start: 2023-12-29

## 2023-12-22 RX ORDER — HEPARIN 100 UNIT/ML
500 SYRINGE INTRAVENOUS PRN
OUTPATIENT
Start: 2023-12-29

## 2023-12-22 RX ORDER — SODIUM CHLORIDE 9 MG/ML
5-250 INJECTION, SOLUTION INTRAVENOUS PRN
OUTPATIENT
Start: 2023-12-29

## 2023-12-22 RX ORDER — ACETAMINOPHEN 325 MG/1
650 TABLET ORAL
OUTPATIENT
Start: 2023-12-29

## 2023-12-22 RX ORDER — SODIUM CHLORIDE 0.9 % (FLUSH) 0.9 %
5-40 SYRINGE (ML) INJECTION PRN
OUTPATIENT
Start: 2023-12-29

## 2023-12-22 RX ORDER — DIPHENHYDRAMINE HYDROCHLORIDE 50 MG/ML
50 INJECTION INTRAMUSCULAR; INTRAVENOUS
OUTPATIENT
Start: 2023-12-29

## 2023-12-22 RX ORDER — EPINEPHRINE 1 MG/ML
0.3 INJECTION, SOLUTION INTRAMUSCULAR; SUBCUTANEOUS PRN
OUTPATIENT
Start: 2023-12-29

## 2023-12-22 RX ORDER — SODIUM CHLORIDE 9 MG/ML
5-250 INJECTION, SOLUTION INTRAVENOUS PRN
Status: DISCONTINUED | OUTPATIENT
Start: 2023-12-22 | End: 2023-12-23 | Stop reason: HOSPADM

## 2023-12-22 RX ORDER — ONDANSETRON 2 MG/ML
8 INJECTION INTRAMUSCULAR; INTRAVENOUS
OUTPATIENT
Start: 2023-12-29

## 2023-12-22 RX ORDER — ALBUTEROL SULFATE 90 UG/1
4 AEROSOL, METERED RESPIRATORY (INHALATION) PRN
OUTPATIENT
Start: 2023-12-29

## 2023-12-22 RX ORDER — SODIUM CHLORIDE 0.9 % (FLUSH) 0.9 %
5-40 SYRINGE (ML) INJECTION PRN
Status: DISCONTINUED | OUTPATIENT
Start: 2023-12-22 | End: 2023-12-23 | Stop reason: HOSPADM

## 2023-12-22 RX ADMIN — SODIUM CHLORIDE, PRESERVATIVE FREE 10 ML: 5 INJECTION INTRAVENOUS at 15:25

## 2023-12-22 RX ADMIN — SODIUM CHLORIDE 200 ML/HR: 9 INJECTION, SOLUTION INTRAVENOUS at 15:25

## 2023-12-22 RX ADMIN — IRON SUCROSE 200 MG: 20 INJECTION, SOLUTION INTRAVENOUS at 15:25

## 2024-05-01 NOTE — ED NOTES
Forensics at bedside. Received a message from Patti in the MRI Department in relation to this patient needing to be scheduled for a MRI and has a [employer] ICD.  Please see information below as it relates to patient's Device being MRI compatible/conditional.  To meet protocol the Pacemaker/ICD must have been implanted no less than 6 weeks prior to scheduled date of Scan.  ICD/PPM and leads must be from same .  MRI informed ordering MD must input a Cardiac Device Check in clinic and hospital order, patient must have an xray within 6 months or less prior to MRI reprogramming.  Chest xray to be reviewed by Radiologist.        As per [employer]'s MR-Conditional product listing site this (ICD) system (IS) MRI compatible.  MRI scheduled on (5/8/24@11:00 am).  [employer] Rep (Ava MATTSON) notified on (4/29/24).  Confirmation received from Rep.

## 2024-05-30 ENCOUNTER — TELEPHONE (OUTPATIENT)
Age: 29
End: 2024-05-30

## 2024-07-25 ENCOUNTER — HOSPITAL ENCOUNTER (EMERGENCY)
Facility: HOSPITAL | Age: 29
Discharge: HOME | End: 2024-07-25
Attending: EMERGENCY MEDICINE
Payer: MEDICAID

## 2024-07-25 VITALS
OXYGEN SATURATION: 90 % | BODY MASS INDEX: 30.43 KG/M2 | WEIGHT: 155 LBS | HEIGHT: 60 IN | HEART RATE: 93 BPM | DIASTOLIC BLOOD PRESSURE: 81 MMHG | RESPIRATION RATE: 16 BRPM | SYSTOLIC BLOOD PRESSURE: 100 MMHG | TEMPERATURE: 98.3 F

## 2024-07-25 DIAGNOSIS — F32.A ANXIETY AND DEPRESSION: Primary | ICD-10-CM

## 2024-07-25 DIAGNOSIS — M79.10 MYALGIA: ICD-10-CM

## 2024-07-25 DIAGNOSIS — F41.9 ANXIETY AND DEPRESSION: Primary | ICD-10-CM

## 2024-07-25 LAB
ALBUMIN SERPL-MCNC: 3 G/DL (ref 3.5–5)
ALBUMIN/GLOB SERPL: 0.7 (ref 1.1–2.2)
ALP SERPL-CCNC: 95 U/L (ref 45–117)
ALT SERPL-CCNC: 14 U/L (ref 12–78)
ANION GAP SERPL CALC-SCNC: 4 MMOL/L (ref 5–15)
AST SERPL-CCNC: 9 U/L (ref 15–37)
BASOPHILS # BLD: 0 K/UL (ref 0–0.1)
BASOPHILS NFR BLD: 0 % (ref 0–1)
BILIRUB SERPL-MCNC: 0.4 MG/DL (ref 0.2–1)
BUN SERPL-MCNC: 10 MG/DL (ref 6–20)
BUN/CREAT SERPL: 14 (ref 12–20)
CALCIUM SERPL-MCNC: 9 MG/DL (ref 8.5–10.1)
CHLORIDE SERPL-SCNC: 104 MMOL/L (ref 97–108)
CO2 SERPL-SCNC: 30 MMOL/L (ref 21–32)
COMMENT:: NORMAL
CREAT SERPL-MCNC: 0.74 MG/DL (ref 0.55–1.02)
DIFFERENTIAL METHOD BLD: ABNORMAL
EOSINOPHIL # BLD: 0 K/UL (ref 0–0.4)
EOSINOPHIL NFR BLD: 0 % (ref 0–7)
ERYTHROCYTE [DISTWIDTH] IN BLOOD BY AUTOMATED COUNT: 13.1 % (ref 11.5–14.5)
GLOBULIN SER CALC-MCNC: 4.6 G/DL (ref 2–4)
GLUCOSE SERPL-MCNC: 102 MG/DL (ref 65–100)
HCT VFR BLD AUTO: 34.6 % (ref 35–47)
HGB BLD-MCNC: 10.6 G/DL (ref 11.5–16)
IMM GRANULOCYTES # BLD AUTO: 0 K/UL (ref 0–0.04)
IMM GRANULOCYTES NFR BLD AUTO: 0 % (ref 0–0.5)
LYMPHOCYTES # BLD: 1.9 K/UL (ref 0.8–3.5)
LYMPHOCYTES NFR BLD: 32 % (ref 12–49)
MCH RBC QN AUTO: 26.1 PG (ref 26–34)
MCHC RBC AUTO-ENTMCNC: 30.6 G/DL (ref 30–36.5)
MCV RBC AUTO: 85.2 FL (ref 80–99)
MONOCYTES # BLD: 0.7 K/UL (ref 0–1)
MONOCYTES NFR BLD: 12 % (ref 5–13)
NEUTS SEG # BLD: 3.2 K/UL (ref 1.8–8)
NEUTS SEG NFR BLD: 56 % (ref 32–75)
NRBC # BLD: 0 K/UL (ref 0–0.01)
NRBC BLD-RTO: 0 PER 100 WBC
PLATELET # BLD AUTO: 433 K/UL (ref 150–400)
PMV BLD AUTO: 9.2 FL (ref 8.9–12.9)
POTASSIUM SERPL-SCNC: 3.3 MMOL/L (ref 3.5–5.1)
PROT SERPL-MCNC: 7.6 G/DL (ref 6.4–8.2)
RBC # BLD AUTO: 4.06 M/UL (ref 3.8–5.2)
SODIUM SERPL-SCNC: 138 MMOL/L (ref 136–145)
SPECIMEN HOLD: NORMAL
WBC # BLD AUTO: 5.9 K/UL (ref 3.6–11)

## 2024-07-25 PROCEDURE — 99284 EMERGENCY DEPT VISIT MOD MDM: CPT

## 2024-07-25 PROCEDURE — 96374 THER/PROPH/DIAG INJ IV PUSH: CPT

## 2024-07-25 PROCEDURE — 85025 COMPLETE CBC W/AUTO DIFF WBC: CPT

## 2024-07-25 PROCEDURE — 6360000002 HC RX W HCPCS: Performed by: EMERGENCY MEDICINE

## 2024-07-25 PROCEDURE — 36415 COLL VENOUS BLD VENIPUNCTURE: CPT

## 2024-07-25 PROCEDURE — 6370000000 HC RX 637 (ALT 250 FOR IP): Performed by: EMERGENCY MEDICINE

## 2024-07-25 PROCEDURE — 80053 COMPREHEN METABOLIC PANEL: CPT

## 2024-07-25 RX ORDER — PREDNISONE 20 MG/1
40 TABLET ORAL
Status: COMPLETED | OUTPATIENT
Start: 2024-07-25 | End: 2024-07-25

## 2024-07-25 RX ORDER — PREDNISONE 10 MG/1
1 TABLET ORAL DAILY
Qty: 21 EACH | Refills: 0 | Status: SHIPPED | OUTPATIENT
Start: 2024-07-25

## 2024-07-25 RX ORDER — LORAZEPAM 1 MG/1
1 TABLET ORAL EVERY 6 HOURS PRN
Qty: 20 TABLET | Refills: 0 | Status: SHIPPED | OUTPATIENT
Start: 2024-07-25 | End: 2024-08-24

## 2024-07-25 RX ORDER — LORAZEPAM 2 MG/ML
1.5 INJECTION INTRAMUSCULAR ONCE
Status: COMPLETED | OUTPATIENT
Start: 2024-07-25 | End: 2024-07-25

## 2024-07-25 RX ADMIN — PREDNISONE 40 MG: 20 TABLET ORAL at 12:47

## 2024-07-25 RX ADMIN — LORAZEPAM 1.5 MG: 2 INJECTION INTRAMUSCULAR; INTRAVENOUS at 12:47

## 2024-07-25 ASSESSMENT — ENCOUNTER SYMPTOMS: SHORTNESS OF BREATH: 0

## 2024-07-25 NOTE — DISCHARGE INSTRUCTIONS
It was a pleasure taking care of you at [facility] today.  We know that when you come to [facility], you are entrusting us with your health, comfort, and safety.  Our physicians and nurses honor that trust, and we appreciate the opportunity to care for you and your loved ones.  We also value your feedback, and we would like to hear from you.    If you receive a  >>> survey <<< about your Emergency Department experience today, please fill it out. We review every single response from our patients. Thank you!

## 2024-07-26 NOTE — ED PROVIDER NOTES
worsening for the past week.    On examination she is quite upset, anxious and depressed, very tearful.  She has an unremarkable physical exam otherwise.  No concerning peripheral edema.    Laboratories and vital signs reassuring.    Reports allergy to NSAIDs.    She was treated with Ativan and prednisone for generalized myalgias.  Stable for discharge      Final Diagnosis:   1. Anxiety and depression    2. Myalgia        Additional documentation if relevant for this encounter       Diagnosis and Disposition     Disposition: Decision To Discharge 07/25/2024 02:30:23 PM     Final Diagnosis:   1. Anxiety and depression    2. Myalgia           Medication List        START taking these medications      LORazepam 1 MG tablet  Commonly known as: Ativan  Take 1 tablet by mouth every 6 hours as needed for Anxiety for up to 30 days. Max Daily Amount: 4 mg     predniSONE 10 MG (21) Tbpk  Take 1 each by mouth daily            CONTINUE taking these medications      IUD'S IU            ASK your doctor about these medications      mesalamine 1.2 g EC tablet  Commonly known as: LIALDA               Where to Get Your Medications        These medications were sent to [id number] - [place], [place] - [address] [facility] - P [phone] - F [phone] [id number] [phone]      Phone: [phone]   LORazepam 1 MG tablet  predniSONE 10 MG (21) Tbpk          Follow up:  [facility] EMERGENCY DEPT  [address] [facility] [address] [phone]        Tyesha Wright, DNP  [address] [facility] [address] [phone]             Disclaimers   I was the first provider for this patient on this visit.  To the best of my ability I reviewed relevant prior medical records, electrocardiograms, laboratories, and radiologic studies.    The patient's presenting problems were discussed, and the patient was in agreement with the care plan formulated and outlined with

## 2024-10-10 ENCOUNTER — APPOINTMENT (OUTPATIENT)
Facility: HOSPITAL | Age: 29
End: 2024-10-10
Payer: MEDICAID

## 2024-10-10 ENCOUNTER — HOSPITAL ENCOUNTER (EMERGENCY)
Facility: HOSPITAL | Age: 29
Discharge: HOME | End: 2024-10-10
Payer: MEDICAID

## 2024-10-10 VITALS
RESPIRATION RATE: 22 BRPM | DIASTOLIC BLOOD PRESSURE: 77 MMHG | WEIGHT: 155 LBS | SYSTOLIC BLOOD PRESSURE: 130 MMHG | TEMPERATURE: 98.1 F | HEIGHT: 60 IN | BODY MASS INDEX: 30.43 KG/M2 | HEART RATE: 62 BPM | OXYGEN SATURATION: 99 %

## 2024-10-10 DIAGNOSIS — R07.89 CHEST DISCOMFORT: ICD-10-CM

## 2024-10-10 DIAGNOSIS — K08.89 PAIN, DENTAL: Primary | ICD-10-CM

## 2024-10-10 LAB
ALBUMIN SERPL-MCNC: 3.6 G/DL (ref 3.5–5)
ALBUMIN/GLOB SERPL: 0.9 (ref 1.1–2.2)
ALP SERPL-CCNC: 99 U/L (ref 45–117)
ALT SERPL-CCNC: 13 U/L (ref 12–78)
ANION GAP SERPL CALC-SCNC: 8 MMOL/L (ref 2–12)
APPEARANCE UR: ABNORMAL
AST SERPL-CCNC: 6 U/L (ref 15–37)
BACTERIA URNS QL MICRO: ABNORMAL /HPF
BASOPHILS # BLD: 0 K/UL (ref 0–0.1)
BASOPHILS NFR BLD: 0 % (ref 0–1)
BILIRUB SERPL-MCNC: 0.5 MG/DL (ref 0.2–1)
BILIRUB UR QL: NEGATIVE
BUN SERPL-MCNC: 13 MG/DL (ref 6–20)
BUN/CREAT SERPL: 22 (ref 12–20)
CALCIUM SERPL-MCNC: 8.9 MG/DL (ref 8.5–10.1)
CHLORIDE SERPL-SCNC: 105 MMOL/L (ref 97–108)
CO2 SERPL-SCNC: 24 MMOL/L (ref 21–32)
COLOR UR: ABNORMAL
CREAT SERPL-MCNC: 0.59 MG/DL (ref 0.55–1.02)
D DIMER PPP FEU-MCNC: 0.32 MG/L FEU (ref 0–0.65)
DIFFERENTIAL METHOD BLD: ABNORMAL
EOSINOPHIL # BLD: 0 K/UL (ref 0–0.4)
EOSINOPHIL NFR BLD: 0 % (ref 0–7)
EPITH CASTS URNS QL MICRO: ABNORMAL /LPF
ERYTHROCYTE [DISTWIDTH] IN BLOOD BY AUTOMATED COUNT: 14.5 % (ref 11.5–14.5)
FLUAV RNA SPEC QL NAA+PROBE: NOT DETECTED
FLUBV RNA SPEC QL NAA+PROBE: NOT DETECTED
GLOBULIN SER CALC-MCNC: 3.8 G/DL (ref 2–4)
GLUCOSE SERPL-MCNC: 126 MG/DL (ref 65–100)
GLUCOSE UR STRIP.AUTO-MCNC: NEGATIVE MG/DL
HCG UR QL: NEGATIVE
HCT VFR BLD AUTO: 31.1 % (ref 35–47)
HGB BLD-MCNC: 9.7 G/DL (ref 11.5–16)
HGB UR QL STRIP: NEGATIVE
IMM GRANULOCYTES # BLD AUTO: 0 K/UL (ref 0–0.04)
IMM GRANULOCYTES NFR BLD AUTO: 0 % (ref 0–0.5)
KETONES UR QL STRIP.AUTO: 40 MG/DL
LACTATE BLD-SCNC: 0.9 MMOL/L (ref 0.4–2)
LEUKOCYTE ESTERASE UR QL STRIP.AUTO: NEGATIVE
LIPASE SERPL-CCNC: 10 U/L (ref 13–75)
LYMPHOCYTES # BLD: 1 K/UL (ref 0.8–3.5)
LYMPHOCYTES NFR BLD: 14 % (ref 12–49)
MCH RBC QN AUTO: 25.2 PG (ref 26–34)
MCHC RBC AUTO-ENTMCNC: 31.2 G/DL (ref 30–36.5)
MCV RBC AUTO: 80.8 FL (ref 80–99)
MONOCYTES # BLD: 0.4 K/UL (ref 0–1)
MONOCYTES NFR BLD: 5 % (ref 5–13)
MUCOUS THREADS URNS QL MICRO: ABNORMAL /LPF
NEUTS SEG # BLD: 6.1 K/UL (ref 1.8–8)
NEUTS SEG NFR BLD: 81 % (ref 32–75)
NITRITE UR QL STRIP.AUTO: NEGATIVE
NRBC # BLD: 0 K/UL (ref 0–0.01)
NRBC BLD-RTO: 0 PER 100 WBC
PH UR STRIP: 5.5 (ref 5–8)
PLATELET # BLD AUTO: 418 K/UL (ref 150–400)
PMV BLD AUTO: 10.2 FL (ref 8.9–12.9)
POTASSIUM SERPL-SCNC: 3.8 MMOL/L (ref 3.5–5.1)
PROT SERPL-MCNC: 7.4 G/DL (ref 6.4–8.2)
PROT UR STRIP-MCNC: 30 MG/DL
RBC # BLD AUTO: 3.85 M/UL (ref 3.8–5.2)
RBC #/AREA URNS HPF: ABNORMAL /HPF (ref 0–5)
SARS-COV-2 RNA RESP QL NAA+PROBE: NOT DETECTED
SODIUM SERPL-SCNC: 137 MMOL/L (ref 136–145)
SOURCE: NORMAL
SP GR UR REFRACTOMETRY: 1.03
TROPONIN I SERPL HS-MCNC: <4 NG/L (ref 0–51)
URINE CULTURE IF INDICATED: ABNORMAL
UROBILINOGEN UR QL STRIP.AUTO: 1 EU/DL (ref 0.2–1)
WBC # BLD AUTO: 7.6 K/UL (ref 3.6–11)
WBC URNS QL MICRO: ABNORMAL /HPF (ref 0–4)

## 2024-10-10 PROCEDURE — 99285 EMERGENCY DEPT VISIT HI MDM: CPT

## 2024-10-10 PROCEDURE — 2500000003 HC RX 250 WO HCPCS: Performed by: PHYSICIAN ASSISTANT

## 2024-10-10 PROCEDURE — 2580000003 HC RX 258: Performed by: PHYSICIAN ASSISTANT

## 2024-10-10 PROCEDURE — 85025 COMPLETE CBC W/AUTO DIFF WBC: CPT

## 2024-10-10 PROCEDURE — 80053 COMPREHEN METABOLIC PANEL: CPT

## 2024-10-10 PROCEDURE — 6370000000 HC RX 637 (ALT 250 FOR IP): Performed by: PHYSICIAN ASSISTANT

## 2024-10-10 PROCEDURE — 36415 COLL VENOUS BLD VENIPUNCTURE: CPT

## 2024-10-10 PROCEDURE — 85379 FIBRIN DEGRADATION QUANT: CPT

## 2024-10-10 PROCEDURE — 87636 SARSCOV2 & INF A&B AMP PRB: CPT

## 2024-10-10 PROCEDURE — 6360000002 HC RX W HCPCS: Performed by: PHYSICIAN ASSISTANT

## 2024-10-10 PROCEDURE — 83605 ASSAY OF LACTIC ACID: CPT

## 2024-10-10 PROCEDURE — 84484 ASSAY OF TROPONIN QUANT: CPT

## 2024-10-10 PROCEDURE — 81001 URINALYSIS AUTO W/SCOPE: CPT

## 2024-10-10 PROCEDURE — 71045 X-RAY EXAM CHEST 1 VIEW: CPT

## 2024-10-10 PROCEDURE — 81025 URINE PREGNANCY TEST: CPT

## 2024-10-10 PROCEDURE — 93005 ELECTROCARDIOGRAM TRACING: CPT | Performed by: EMERGENCY MEDICINE

## 2024-10-10 PROCEDURE — 83690 ASSAY OF LIPASE: CPT

## 2024-10-10 RX ORDER — OXYCODONE HYDROCHLORIDE 5 MG/1
5 TABLET ORAL EVERY 6 HOURS PRN
Qty: 12 TABLET | Refills: 0 | Status: SHIPPED | OUTPATIENT
Start: 2024-10-10 | End: 2024-10-13

## 2024-10-10 RX ORDER — HYDROMORPHONE HYDROCHLORIDE 1 MG/ML
0.5 INJECTION, SOLUTION INTRAMUSCULAR; INTRAVENOUS; SUBCUTANEOUS
Status: COMPLETED | OUTPATIENT
Start: 2024-10-10 | End: 2024-10-10

## 2024-10-10 RX ORDER — 0.9 % SODIUM CHLORIDE 0.9 %
1000 INTRAVENOUS SOLUTION INTRAVENOUS ONCE
Status: COMPLETED | OUTPATIENT
Start: 2024-10-10 | End: 2024-10-10

## 2024-10-10 RX ORDER — PENICILLIN V POTASSIUM 500 MG/1
500 TABLET, FILM COATED ORAL 4 TIMES DAILY
Qty: 40 TABLET | Refills: 0 | Status: SHIPPED | OUTPATIENT
Start: 2024-10-10 | End: 2024-10-20

## 2024-10-10 RX ORDER — ONDANSETRON 2 MG/ML
4 INJECTION INTRAMUSCULAR; INTRAVENOUS ONCE
Status: COMPLETED | OUTPATIENT
Start: 2024-10-10 | End: 2024-10-10

## 2024-10-10 RX ADMIN — SODIUM CHLORIDE 1000 ML: 9 INJECTION, SOLUTION INTRAVENOUS at 17:16

## 2024-10-10 RX ADMIN — ONDANSETRON 4 MG: 2 INJECTION INTRAMUSCULAR; INTRAVENOUS at 17:18

## 2024-10-10 RX ADMIN — DIPHENHYDRAMINE HYDROCHLORIDE: 25 LIQUID ORAL at 18:57

## 2024-10-10 RX ADMIN — HYDROMORPHONE HYDROCHLORIDE 0.5 MG: 1 INJECTION, SOLUTION INTRAMUSCULAR; INTRAVENOUS; SUBCUTANEOUS at 17:19

## 2024-10-10 ASSESSMENT — PAIN DESCRIPTION - DESCRIPTORS: DESCRIPTORS: THROBBING

## 2024-10-10 ASSESSMENT — PAIN SCALES - GENERAL
PAINLEVEL_OUTOF10: 10
PAINLEVEL_OUTOF10: 8
PAINLEVEL_OUTOF10: 4

## 2024-10-10 ASSESSMENT — PAIN DESCRIPTION - LOCATION: LOCATION: HEAD

## 2024-10-10 NOTE — ED NOTES
Bedside report given to RIVKA Ryan by RIVKA Powell. Nurse was informed of reason for arrival, vitals, labs, medications, orders, procedures, results, cardiac rhythm, any outstanding and pending orders and plan of care. Opportunity for questions were provided for receiving RN at this time.

## 2024-10-10 NOTE — ED PROVIDER NOTES
DISPOSITION/PLAN   DISPOSITION Decision To Discharge 10/10/2024 07:24:03 PM  Condition at Disposition: Data Unavailable      Discharge Note: The patient is stable for discharge home. The signs, symptoms, diagnosis, and discharge instructions have been discussed, understanding conveyed, and agreed upon. The patient is to follow up as recommended or return to ER should their symptoms worsen.      PATIENT REFERRED TO:  [facility] EMERGENCY DEPT  [address] [address]  [phone]    If symptoms worsen    Tyesha Wright, DNP  [address] [id number] [phone]             DISCHARGE MEDICATIONS:     Medication List        START taking these medications      oxyCODONE 5 MG immediate release tablet  Commonly known as: Roxicodone  Take 1 tablet by mouth every 6 hours as needed for Pain for up to 3 days. Intended supply: 3 days. Take lowest dose possible to manage pain Max Daily Amount: 20 mg     penicillin v potassium 500 MG tablet  Commonly known as: VEETID  Take 1 tablet by mouth 4 times daily for 10 days            CONTINUE taking these medications      IUD'S IU            ASK your doctor about these medications      mesalamine 1.2 g EC tablet  Commonly known as: LIALDA     predniSONE 10 MG (21) Tbpk  Take 1 each by mouth daily               Where to Get Your Medications        These medications were sent to [id number] - [place], [place] - [address] - P [phone] - F [phone] [address] [address]      Phone: [phone]   oxyCODONE 5 MG immediate release tablet  penicillin v potassium 500 MG tablet           DISCONTINUED MEDICATIONS:  Current Discharge Medication List          I have seen and evaluated the patient autonomously. My supervision physician was on site and available for consultation if needed.     I am the Primary Clinician of Record.   Dee Tran PA-C (electronically

## 2024-10-10 NOTE — DISCHARGE INSTRUCTIONS
Please follow-up closely with your dentist.  If you have an increase in facial swelling, pain, difficulty swallowing, shortness of breath, any worsening of symptoms you need to return to the emergency department.

## 2024-10-11 LAB
EKG ATRIAL RATE: 97 BPM
EKG DIAGNOSIS: NORMAL
EKG P AXIS: 79 DEGREES
EKG P-R INTERVAL: 116 MS
EKG Q-T INTERVAL: 346 MS
EKG QRS DURATION: 82 MS
EKG QTC CALCULATION (BAZETT): 439 MS
EKG R AXIS: 75 DEGREES
EKG T AXIS: 59 DEGREES
EKG VENTRICULAR RATE: 97 BPM

## 2024-10-11 PROCEDURE — 93010 ELECTROCARDIOGRAM REPORT: CPT | Performed by: SPECIALIST

## 2024-11-03 ENCOUNTER — APPOINTMENT (OUTPATIENT)
Facility: HOSPITAL | Age: 29
DRG: 245 | End: 2024-11-03
Payer: MEDICAID

## 2024-11-03 ENCOUNTER — HOSPITAL ENCOUNTER (INPATIENT)
Facility: HOSPITAL | Age: 29
LOS: 4 days | Discharge: HOME | DRG: 245 | End: 2024-11-07
Attending: EMERGENCY MEDICINE | Admitting: FAMILY MEDICINE
Payer: MEDICAID

## 2024-11-03 DIAGNOSIS — F11.20 OPIOID USE DISORDER, SEVERE, DEPENDENCE (HCC): ICD-10-CM

## 2024-11-03 DIAGNOSIS — K52.9 COLITIS: Primary | ICD-10-CM

## 2024-11-03 DIAGNOSIS — R10.84 GENERALIZED ABDOMINAL PAIN: ICD-10-CM

## 2024-11-03 DIAGNOSIS — R00.0 TACHYCARDIA: ICD-10-CM

## 2024-11-03 LAB
ALBUMIN SERPL-MCNC: 3.5 G/DL (ref 3.5–5)
ALBUMIN/GLOB SERPL: 0.8 (ref 1.1–2.2)
ALP SERPL-CCNC: 114 U/L (ref 45–117)
ALT SERPL-CCNC: 16 U/L (ref 12–78)
AMPHET UR QL SCN: NEGATIVE
ANION GAP SERPL CALC-SCNC: 6 MMOL/L (ref 2–12)
APPEARANCE UR: CLEAR
AST SERPL-CCNC: 10 U/L (ref 15–37)
BACTERIA URNS QL MICRO: NEGATIVE /HPF
BARBITURATES UR QL SCN: NEGATIVE
BASOPHILS # BLD: 0 K/UL (ref 0–0.1)
BASOPHILS NFR BLD: 1 % (ref 0–1)
BENZODIAZ UR QL: POSITIVE
BILIRUB SERPL-MCNC: 0.9 MG/DL (ref 0.2–1)
BILIRUB UR QL: NEGATIVE
BUN SERPL-MCNC: 10 MG/DL (ref 6–20)
BUN/CREAT SERPL: 12 (ref 12–20)
CALCIUM SERPL-MCNC: 8.8 MG/DL (ref 8.5–10.1)
CANNABINOIDS UR QL SCN: NEGATIVE
CHLORIDE SERPL-SCNC: 101 MMOL/L (ref 97–108)
CO2 SERPL-SCNC: 29 MMOL/L (ref 21–32)
COCAINE UR QL SCN: POSITIVE
COLOR UR: ABNORMAL
COMMENT:: NORMAL
CREAT SERPL-MCNC: 0.86 MG/DL (ref 0.55–1.02)
DIFFERENTIAL METHOD BLD: ABNORMAL
EOSINOPHIL # BLD: 0 K/UL (ref 0–0.4)
EOSINOPHIL NFR BLD: 0 % (ref 0–7)
EPITH CASTS URNS QL MICRO: ABNORMAL /LPF
ERYTHROCYTE [DISTWIDTH] IN BLOOD BY AUTOMATED COUNT: 16.2 % (ref 11.5–14.5)
FLUAV RNA SPEC QL NAA+PROBE: NOT DETECTED
FLUBV RNA SPEC QL NAA+PROBE: NOT DETECTED
GLOBULIN SER CALC-MCNC: 4.4 G/DL (ref 2–4)
GLUCOSE SERPL-MCNC: 106 MG/DL (ref 65–100)
GLUCOSE UR STRIP.AUTO-MCNC: NEGATIVE MG/DL
HCG UR QL: NEGATIVE
HCT VFR BLD AUTO: 34.1 % (ref 35–47)
HGB BLD-MCNC: 10.5 G/DL (ref 11.5–16)
HGB UR QL STRIP: NEGATIVE
IMM GRANULOCYTES # BLD AUTO: 0 K/UL (ref 0–0.04)
IMM GRANULOCYTES NFR BLD AUTO: 1 % (ref 0–0.5)
KETONES UR QL STRIP.AUTO: 15 MG/DL
LACTATE SERPL-SCNC: 0.8 MMOL/L (ref 0.4–2)
LEUKOCYTE ESTERASE UR QL STRIP.AUTO: ABNORMAL
LIPASE SERPL-CCNC: 9 U/L (ref 13–75)
LYMPHOCYTES # BLD: 0.5 K/UL (ref 0.8–3.5)
LYMPHOCYTES NFR BLD: 12 % (ref 12–49)
Lab: ABNORMAL
MCH RBC QN AUTO: 25.9 PG (ref 26–34)
MCHC RBC AUTO-ENTMCNC: 30.8 G/DL (ref 30–36.5)
MCV RBC AUTO: 84 FL (ref 80–99)
METHADONE UR QL: NEGATIVE
MONOCYTES # BLD: 0.3 K/UL (ref 0–1)
MONOCYTES NFR BLD: 8 % (ref 5–13)
NEUTS SEG # BLD: 3.3 K/UL (ref 1.8–8)
NEUTS SEG NFR BLD: 78 % (ref 32–75)
NITRITE UR QL STRIP.AUTO: NEGATIVE
NRBC # BLD: 0 K/UL (ref 0–0.01)
NRBC BLD-RTO: 0 PER 100 WBC
OPIATES UR QL: POSITIVE
PCP UR QL: NEGATIVE
PH UR STRIP: 5.5 (ref 5–8)
PLATELET # BLD AUTO: 299 K/UL (ref 150–400)
PMV BLD AUTO: 9.8 FL (ref 8.9–12.9)
POTASSIUM SERPL-SCNC: 3.3 MMOL/L (ref 3.5–5.1)
PROT SERPL-MCNC: 7.9 G/DL (ref 6.4–8.2)
PROT UR STRIP-MCNC: ABNORMAL MG/DL
RBC # BLD AUTO: 4.06 M/UL (ref 3.8–5.2)
RBC #/AREA URNS HPF: ABNORMAL /HPF (ref 0–5)
RBC MORPH BLD: ABNORMAL
SARS-COV-2 RNA RESP QL NAA+PROBE: NOT DETECTED
SODIUM SERPL-SCNC: 136 MMOL/L (ref 136–145)
SOURCE: NORMAL
SP GR UR REFRACTOMETRY: 1.03 (ref 1–1.03)
SPECIMEN HOLD: NORMAL
SPECIMEN HOLD: NORMAL
UROBILINOGEN UR QL STRIP.AUTO: 1 EU/DL (ref 0.2–1)
WBC # BLD AUTO: 4.1 K/UL (ref 3.6–11)
WBC URNS QL MICRO: ABNORMAL /HPF (ref 0–4)

## 2024-11-03 PROCEDURE — 6360000004 HC RX CONTRAST MEDICATION: Performed by: RADIOLOGY

## 2024-11-03 PROCEDURE — 2580000003 HC RX 258: Performed by: FAMILY MEDICINE

## 2024-11-03 PROCEDURE — 99285 EMERGENCY DEPT VISIT HI MDM: CPT

## 2024-11-03 PROCEDURE — 71046 X-RAY EXAM CHEST 2 VIEWS: CPT

## 2024-11-03 PROCEDURE — 83690 ASSAY OF LIPASE: CPT

## 2024-11-03 PROCEDURE — 87636 SARSCOV2 & INF A&B AMP PRB: CPT

## 2024-11-03 PROCEDURE — 6370000000 HC RX 637 (ALT 250 FOR IP): Performed by: NURSE PRACTITIONER

## 2024-11-03 PROCEDURE — 80053 COMPREHEN METABOLIC PANEL: CPT

## 2024-11-03 PROCEDURE — 87040 BLOOD CULTURE FOR BACTERIA: CPT

## 2024-11-03 PROCEDURE — 2580000003 HC RX 258: Performed by: NURSE PRACTITIONER

## 2024-11-03 PROCEDURE — 83605 ASSAY OF LACTIC ACID: CPT

## 2024-11-03 PROCEDURE — 6360000002 HC RX W HCPCS: Performed by: EMERGENCY MEDICINE

## 2024-11-03 PROCEDURE — 74177 CT ABD & PELVIS W/CONTRAST: CPT

## 2024-11-03 PROCEDURE — 36415 COLL VENOUS BLD VENIPUNCTURE: CPT

## 2024-11-03 PROCEDURE — 80307 DRUG TEST PRSMV CHEM ANLYZR: CPT

## 2024-11-03 PROCEDURE — 2580000003 HC RX 258: Performed by: EMERGENCY MEDICINE

## 2024-11-03 PROCEDURE — 81001 URINALYSIS AUTO W/SCOPE: CPT

## 2024-11-03 PROCEDURE — 6360000002 HC RX W HCPCS: Performed by: FAMILY MEDICINE

## 2024-11-03 PROCEDURE — 1200000000 HC SEMI PRIVATE

## 2024-11-03 PROCEDURE — 85025 COMPLETE CBC W/AUTO DIFF WBC: CPT

## 2024-11-03 PROCEDURE — 81025 URINE PREGNANCY TEST: CPT

## 2024-11-03 RX ORDER — POTASSIUM CHLORIDE 7.45 MG/ML
10 INJECTION INTRAVENOUS PRN
Status: DISCONTINUED | OUTPATIENT
Start: 2024-11-04 | End: 2024-11-04

## 2024-11-03 RX ORDER — IOPAMIDOL 755 MG/ML
100 INJECTION, SOLUTION INTRAVASCULAR
Status: COMPLETED | OUTPATIENT
Start: 2024-11-03 | End: 2024-11-03

## 2024-11-03 RX ORDER — ACETAMINOPHEN 650 MG/1
650 SUPPOSITORY RECTAL EVERY 6 HOURS PRN
Status: DISCONTINUED | OUTPATIENT
Start: 2024-11-03 | End: 2024-11-07 | Stop reason: HOSPADM

## 2024-11-03 RX ORDER — 0.9 % SODIUM CHLORIDE 0.9 %
1000 INTRAVENOUS SOLUTION INTRAVENOUS ONCE
Status: COMPLETED | OUTPATIENT
Start: 2024-11-03 | End: 2024-11-03

## 2024-11-03 RX ORDER — NALOXONE HYDROCHLORIDE 0.4 MG/ML
0.4 INJECTION, SOLUTION INTRAMUSCULAR; INTRAVENOUS; SUBCUTANEOUS PRN
Status: DISCONTINUED | OUTPATIENT
Start: 2024-11-03 | End: 2024-11-07 | Stop reason: HOSPADM

## 2024-11-03 RX ORDER — ACETAMINOPHEN 325 MG/1
650 TABLET ORAL EVERY 6 HOURS PRN
Status: DISCONTINUED | OUTPATIENT
Start: 2024-11-03 | End: 2024-11-07 | Stop reason: HOSPADM

## 2024-11-03 RX ORDER — ONDANSETRON 2 MG/ML
4 INJECTION INTRAMUSCULAR; INTRAVENOUS EVERY 6 HOURS PRN
Status: DISCONTINUED | OUTPATIENT
Start: 2024-11-03 | End: 2024-11-07 | Stop reason: HOSPADM

## 2024-11-03 RX ORDER — POLYETHYLENE GLYCOL 3350 17 G/17G
17 POWDER, FOR SOLUTION ORAL DAILY PRN
Status: DISCONTINUED | OUTPATIENT
Start: 2024-11-03 | End: 2024-11-07 | Stop reason: HOSPADM

## 2024-11-03 RX ORDER — OXYCODONE HYDROCHLORIDE 5 MG/1
5 TABLET ORAL ONCE
Status: COMPLETED | OUTPATIENT
Start: 2024-11-03 | End: 2024-11-03

## 2024-11-03 RX ORDER — SODIUM CHLORIDE 0.9 % (FLUSH) 0.9 %
5-40 SYRINGE (ML) INJECTION EVERY 12 HOURS SCHEDULED
Status: DISCONTINUED | OUTPATIENT
Start: 2024-11-03 | End: 2024-11-07 | Stop reason: HOSPADM

## 2024-11-03 RX ORDER — OXYCODONE HYDROCHLORIDE 5 MG/1
5 TABLET ORAL EVERY 6 HOURS PRN
Qty: 10 TABLET | Refills: 0 | Status: SHIPPED | OUTPATIENT
Start: 2024-11-03 | End: 2024-11-06

## 2024-11-03 RX ORDER — MAGNESIUM SULFATE IN WATER 40 MG/ML
2000 INJECTION, SOLUTION INTRAVENOUS PRN
Status: DISCONTINUED | OUTPATIENT
Start: 2024-11-03 | End: 2024-11-04

## 2024-11-03 RX ORDER — POTASSIUM CHLORIDE 750 MG/1
40 TABLET, EXTENDED RELEASE ORAL PRN
Status: DISCONTINUED | OUTPATIENT
Start: 2024-11-04 | End: 2024-11-04

## 2024-11-03 RX ORDER — ONDANSETRON 4 MG/1
4 TABLET, ORALLY DISINTEGRATING ORAL EVERY 8 HOURS PRN
Status: DISCONTINUED | OUTPATIENT
Start: 2024-11-03 | End: 2024-11-07 | Stop reason: HOSPADM

## 2024-11-03 RX ORDER — SODIUM CHLORIDE 9 MG/ML
INJECTION, SOLUTION INTRAVENOUS PRN
Status: DISCONTINUED | OUTPATIENT
Start: 2024-11-03 | End: 2024-11-07 | Stop reason: HOSPADM

## 2024-11-03 RX ORDER — SODIUM CHLORIDE 0.9 % (FLUSH) 0.9 %
5-40 SYRINGE (ML) INJECTION PRN
Status: DISCONTINUED | OUTPATIENT
Start: 2024-11-03 | End: 2024-11-07 | Stop reason: HOSPADM

## 2024-11-03 RX ORDER — SODIUM CHLORIDE 9 MG/ML
INJECTION, SOLUTION INTRAVENOUS CONTINUOUS
Status: DISCONTINUED | OUTPATIENT
Start: 2024-11-03 | End: 2024-11-07

## 2024-11-03 RX ADMIN — SODIUM CHLORIDE, PRESERVATIVE FREE 10 ML: 5 INJECTION INTRAVENOUS at 21:50

## 2024-11-03 RX ADMIN — IOPAMIDOL 100 ML: 755 INJECTION, SOLUTION INTRAVENOUS at 14:24

## 2024-11-03 RX ADMIN — ONDANSETRON 4 MG: 2 INJECTION INTRAMUSCULAR; INTRAVENOUS at 21:49

## 2024-11-03 RX ADMIN — OXYCODONE 5 MG: 5 TABLET ORAL at 15:39

## 2024-11-03 RX ADMIN — PIPERACILLIN AND TAZOBACTAM 4500 MG: 4; .5 INJECTION, POWDER, FOR SOLUTION INTRAVENOUS at 16:57

## 2024-11-03 RX ADMIN — SODIUM CHLORIDE 1000 ML: 9 INJECTION, SOLUTION INTRAVENOUS at 16:54

## 2024-11-03 RX ADMIN — HYDROMORPHONE HYDROCHLORIDE 0.5 MG: 1 INJECTION, SOLUTION INTRAMUSCULAR; INTRAVENOUS; SUBCUTANEOUS at 21:49

## 2024-11-03 ASSESSMENT — PAIN - FUNCTIONAL ASSESSMENT
PAIN_FUNCTIONAL_ASSESSMENT: 0-10
PAIN_FUNCTIONAL_ASSESSMENT: PREVENTS OR INTERFERES SOME ACTIVE ACTIVITIES AND ADLS

## 2024-11-03 ASSESSMENT — PAIN SCALES - GENERAL
PAINLEVEL_OUTOF10: 10
PAINLEVEL_OUTOF10: 10
PAINLEVEL_OUTOF10: 7
PAINLEVEL_OUTOF10: 10

## 2024-11-03 ASSESSMENT — PAIN DESCRIPTION - ONSET: ONSET: ON-GOING

## 2024-11-03 ASSESSMENT — PAIN DESCRIPTION - FREQUENCY: FREQUENCY: CONTINUOUS

## 2024-11-03 ASSESSMENT — PAIN DESCRIPTION - DESCRIPTORS
DESCRIPTORS: ACHING
DESCRIPTORS: ACHING
DESCRIPTORS: ACHING;THROBBING
DESCRIPTORS: ACHING

## 2024-11-03 ASSESSMENT — PAIN DESCRIPTION - LOCATION
LOCATION: GENERALIZED

## 2024-11-03 ASSESSMENT — PAIN DESCRIPTION - PAIN TYPE: TYPE: ACUTE PAIN

## 2024-11-03 ASSESSMENT — PAIN DESCRIPTION - ORIENTATION: ORIENTATION: OTHER (COMMENT)

## 2024-11-03 NOTE — DISCHARGE INSTRUCTIONS
Discharge Instructions       PATIENT ID: Marisol Barros  MRN: [id number]   YOB: 1995    DATE OF ADMISSION: 11/3/2024   DATE OF DISCHARGE: 11/7/2024    PRIMARY CARE PROVIDER: Tyesha Wright     ATTENDING PHYSICIAN: Iesha Johnson MD   DISCHARGING PROVIDER: GIRISH Camacho CNP    To contact this individual call [phone] and ask the  to page.   If unavailable ask to be transferred the Adult Hospitalist Department.    DISCHARGE DIAGNOSES UC flare    CONSULTATIONS: [unfilled]    PROCEDURES/SURGERIES: Procedure(s):  COLONOSCOPY DIAGNOSTIC    PENDING TEST RESULTS:   At the time of discharge the following test results are still pending: needs colonoscopy outpatient    FOLLOW UP APPOINTMENTS:   [unfilled]     ADDITIONAL CARE RECOMMENDATIONS: none    DIET:  bland diet  Oral Nutritional Supplements: Ensure High Proonce a day    ACTIVITY: activity as tolerated    WOUND CARE: none    EQUIPMENT needed: none      DISCHARGE MEDICATIONS:   See Medication Reconciliation Form    It is important that you take the medication exactly as they are prescribed.   Keep your medication in the bottles provided by the pharmacist and keep a list of the medication names, dosages, and times to be taken in your wallet.   Do not take other medications without consulting your doctor.       NOTIFY YOUR PHYSICIAN FOR ANY OF THE FOLLOWING:   Fever over 101 degrees for 24 hours.   Chest pain, shortness of breath, fever, chills, nausea, vomiting, diarrhea, change in mentation, falling, weakness, bleeding. Severe pain or pain not relieved by medications.  Or, any other signs or symptoms that you may have questions about.      DISPOSITION:   x Home With:   OT  PT  HH  RN       SNF/Inpatient Rehab/LTAC    Independent/assisted living    Hospice    Other:     CDMP Checked:   Yes y     PROBLEM LIST Updated:  Yes y       Signed:   GIRISH Camacho CNP  11/7/2024  12:41 PM

## 2024-11-03 NOTE — ED TRIAGE NOTES
Pt ambulatory to ED w/ c/o on-going low grade fever, fatigue, body aches x1mo. Pt seen multiple times since start of symptoms but denies improvement. Pt states she was previously placed on abx and steroids for a possible infected wisdom tooth. Seen last Thursday and told to start taking benadryl. Pt also states she had a syncopal event while vomiting last night. Denies current chest pain or SOB. Denies chance of pregnancy.     Pt pale in triage. Hx of crohns--not currently taking medication for it or followed by GI.

## 2024-11-03 NOTE — ED NOTES
(2 VW)   Final Result      Normal PA and lateral chest views.         Electronically signed by Paco Powell MD        Abnormal labs:   Abnormal Labs Reviewed   CBC WITH AUTO DIFFERENTIAL - Abnormal; Notable for the following components:       Result Value    Hemoglobin 10.5 (*)     Hematocrit 34.1 (*)     MCH 25.9 (*)     RDW 16.2 (*)     Neutrophils % 78 (*)     Immature Granulocytes % 1 (*)     Lymphocytes Absolute 0.5 (*)     All other components within normal limits   COMPREHENSIVE METABOLIC PANEL - Abnormal; Notable for the following components:    Potassium 3.3 (*)     Glucose 106 (*)     AST 10 (*)     Globulin 4.4 (*)     Albumin/Globulin Ratio 0.8 (*)     All other components within normal limits   URINALYSIS WITH MICROSCOPIC - Abnormal; Notable for the following components:    Protein, UA TRACE (*)     Ketones, Urine 15 (*)     Leukocyte Esterase, Urine TRACE (*)     All other components within normal limits   URINE DRUG SCREEN - Abnormal; Notable for the following components:    Benzodiazepines, Urine Positive (*)     Cocaine, Urine Positive (*)     Opiates, Urine Positive (*)     All other components within normal limits   LIPASE - Abnormal; Notable for the following components:    Lipase 9 (*)     All other components within normal limits       Vitals/MEWS: MEWS Score: 1  Level of Consciousness: Alert (0)   Vitals:    11/03/24 1255 11/03/24 1539 11/03/24 1540 11/03/24 1833   BP: (!) 104/58  (!) 103/59 112/65   Pulse: (!) 115  90 84   Resp: 18 16 16 15   Temp: 99.8 °F (37.7 °C)  98.2 °F (36.8 °C) 98.1 °F (36.7 °C)   TempSrc: Oral  Oral Oral   SpO2: 96%  97% 98%   Weight: 68.9 kg (151 lb 14.4 oz)        DI:   Predictive Model Details          14 (Normal)  Factor Value    Calculated 11/3/2024 18:34 42% Age 29 years old    Deterioration Index Model 19% Respiratory rate 15     16% Sodium 136 mmol/L     13% Potassium abnormal (3.3 mmol/L)     3% Pulse 84     2% Hematocrit abnormal (34.1 %)     2% WBC count 4.1  Sending RN at 8184  Electronically signed by: Electronically signed by Kathy Nava RN on 11/3/2024 at 6:34 PM

## 2024-11-03 NOTE — ED PROVIDER NOTES
deficit.      Motor: No weakness.   Psychiatric:         Behavior: Behavior normal.         Thought Content: Thought content normal.         Judgment: Judgment normal.      Comments: Tearful on examination             EMERGENCY DEPARTMENT COURSE and DIFFERENTIAL DIAGNOSIS/MDM:   Vitals:    Vitals:    11/03/24 1255   BP: (!) 104/58   Pulse: (!) 115   Resp: 18   Temp: 99.8 °F (37.7 °C)   TempSrc: Oral   SpO2: 96%   Weight: 68.9 kg (151 lb 14.4 oz)         Medical Decision Making  Differential diagnosis includes dehydration, colitis, perforated bowel, pneumonia and others.     Patient presents with ongoing symptoms  including nausea, tachycardia, and decreased urine output.  The patient was seen at [facility], and by their primary care provider over the past 3.5 weeks for symptoms attributed to infected wisdom teeth. They were prescribed penicillin and prednisone for treatment. The patient reports passing out last night due to nausea but denies any chest pain or shortness of breath. They have noted decreased urine output and tachycardia, recorded at 110 bpm during triage. Blood pressure is 104/58 mmHg, and temperature is 99.8°F. The patient also reports a history of Crohn's disease and states they have experienced blood in their stool. Positive abdominal pain.They are currently not taking any medications and do not have a GI specialist. States she has \"been ill for to long and needs to feel better. \"    Combination, imaging and laboratory data were obtained.  CMP shows hypokalemia with a K3.3.  Otherwise unremarkable.  CBC is unremarkable.  UA is negative.  Lipase is within normal limits.  COVID-19 negative, influenza A and influenza B are both negative.  Point-of-care pregnancy test is negative.  Lactic acid is 0.8.  2 sets of blood cultures were sent.  Patient was able to tolerate drink in the emergency room however has failed outpatient therapy as she still remains with a diagnosis of colitis per CT scan.   Chest x-ray is negative for pneumonia.  I will admit to the hospitalist service for further evaluation and treatment, IV antibiotics.  IV fluids.  Patient is in agreement with plan of care.  Discussed with Dr. Sam who is in agreement with plan of care.    Any available vitals, labs, images, nursing notes, medications, allergies, PMH, PSH and/or previous records in the chart were reviewed. All of these were considered in the medical decision making process. I individually reviewed any labs and any images obtained. This was discussed with my attending and he/she is in agreement with plan of care.       Problems Addressed:  Colitis: acute illness or injury  Generalized abdominal pain: acute illness or injury  Tachycardia: acute illness or injury    Amount and/or Complexity of Data Reviewed  Labs: ordered. Decision-making details documented in ED Course.  Radiology: ordered. Decision-making details documented in ED Course.    Risk  Prescription drug management.  Decision regarding hospitalization.        REASSESSMENT          CONSULTS:  None    PROCEDURES:     Procedures    Labs Reviewed   CBC WITH AUTO DIFFERENTIAL - Abnormal; Notable for the following components:       Result Value    Hemoglobin 10.5 (*)     Hematocrit 34.1 (*)     MCH 25.9 (*)     RDW 16.2 (*)     Neutrophils % 78 (*)     Immature Granulocytes % 1 (*)     Lymphocytes Absolute 0.5 (*)     All other components within normal limits   COMPREHENSIVE METABOLIC PANEL - Abnormal; Notable for the following components:    Potassium 3.3 (*)     Glucose 106 (*)     AST 10 (*)     Globulin 4.4 (*)     Albumin/Globulin Ratio 0.8 (*)     All other components within normal limits   URINALYSIS WITH MICROSCOPIC - Abnormal; Notable for the following components:    Protein, UA TRACE (*)     Ketones, Urine 15 (*)     Leukocyte Esterase, Urine TRACE (*)     All other components within normal limits   URINE DRUG SCREEN - Abnormal; Notable for the following components:

## 2024-11-04 LAB
ANION GAP SERPL CALC-SCNC: 7 MMOL/L (ref 2–12)
BUN SERPL-MCNC: 6 MG/DL (ref 6–20)
BUN/CREAT SERPL: 9 (ref 12–20)
CALCIUM SERPL-MCNC: 8.5 MG/DL (ref 8.5–10.1)
CHLORIDE SERPL-SCNC: 107 MMOL/L (ref 97–108)
CO2 SERPL-SCNC: 25 MMOL/L (ref 21–32)
CREAT SERPL-MCNC: 0.7 MG/DL (ref 0.55–1.02)
GLUCOSE SERPL-MCNC: 105 MG/DL (ref 65–100)
POTASSIUM SERPL-SCNC: 3.5 MMOL/L (ref 3.5–5.1)
SODIUM SERPL-SCNC: 139 MMOL/L (ref 136–145)

## 2024-11-04 PROCEDURE — 6360000002 HC RX W HCPCS: Performed by: FAMILY MEDICINE

## 2024-11-04 PROCEDURE — 1200000000 HC SEMI PRIVATE

## 2024-11-04 PROCEDURE — 6360000002 HC RX W HCPCS

## 2024-11-04 PROCEDURE — 2580000003 HC RX 258: Performed by: NURSE PRACTITIONER

## 2024-11-04 PROCEDURE — 80048 BASIC METABOLIC PNL TOTAL CA: CPT

## 2024-11-04 PROCEDURE — 6370000000 HC RX 637 (ALT 250 FOR IP): Performed by: FAMILY MEDICINE

## 2024-11-04 PROCEDURE — 6370000000 HC RX 637 (ALT 250 FOR IP): Performed by: NURSE PRACTITIONER

## 2024-11-04 PROCEDURE — 36415 COLL VENOUS BLD VENIPUNCTURE: CPT

## 2024-11-04 PROCEDURE — 2580000003 HC RX 258: Performed by: FAMILY MEDICINE

## 2024-11-04 PROCEDURE — 2580000003 HC RX 258

## 2024-11-04 RX ORDER — CITALOPRAM HYDROBROMIDE 10 MG/1
10 TABLET ORAL DAILY
COMMUNITY

## 2024-11-04 RX ORDER — CITALOPRAM HYDROBROMIDE 20 MG/1
10 TABLET ORAL DAILY
Status: DISCONTINUED | OUTPATIENT
Start: 2024-11-04 | End: 2024-11-07 | Stop reason: HOSPADM

## 2024-11-04 RX ADMIN — ACETAMINOPHEN 650 MG: 325 TABLET ORAL at 06:17

## 2024-11-04 RX ADMIN — HYDROMORPHONE HYDROCHLORIDE 0.5 MG: 1 INJECTION, SOLUTION INTRAMUSCULAR; INTRAVENOUS; SUBCUTANEOUS at 18:08

## 2024-11-04 RX ADMIN — ONDANSETRON 4 MG: 2 INJECTION INTRAMUSCULAR; INTRAVENOUS at 17:32

## 2024-11-04 RX ADMIN — ONDANSETRON 4 MG: 2 INJECTION INTRAMUSCULAR; INTRAVENOUS at 08:43

## 2024-11-04 RX ADMIN — HYDROMORPHONE HYDROCHLORIDE 0.5 MG: 1 INJECTION, SOLUTION INTRAMUSCULAR; INTRAVENOUS; SUBCUTANEOUS at 08:35

## 2024-11-04 RX ADMIN — PIPERACILLIN AND TAZOBACTAM 3375 MG: 3; .375 INJECTION, POWDER, FOR SOLUTION INTRAVENOUS at 11:12

## 2024-11-04 RX ADMIN — SODIUM CHLORIDE, PRESERVATIVE FREE 10 ML: 5 INJECTION INTRAVENOUS at 23:12

## 2024-11-04 RX ADMIN — WATER 30 MG: 1 INJECTION INTRAMUSCULAR; INTRAVENOUS; SUBCUTANEOUS at 17:32

## 2024-11-04 RX ADMIN — HYDROMORPHONE HYDROCHLORIDE 0.5 MG: 1 INJECTION, SOLUTION INTRAMUSCULAR; INTRAVENOUS; SUBCUTANEOUS at 13:58

## 2024-11-04 RX ADMIN — SODIUM CHLORIDE: 9 INJECTION, SOLUTION INTRAVENOUS at 00:05

## 2024-11-04 RX ADMIN — PIPERACILLIN AND TAZOBACTAM 3375 MG: 3; .375 INJECTION, POWDER, FOR SOLUTION INTRAVENOUS at 18:07

## 2024-11-04 RX ADMIN — PIPERACILLIN AND TAZOBACTAM 3375 MG: 3; .375 INJECTION, POWDER, FOR SOLUTION INTRAVENOUS at 03:50

## 2024-11-04 RX ADMIN — ACETAMINOPHEN 650 MG: 325 TABLET ORAL at 14:20

## 2024-11-04 RX ADMIN — ACETAMINOPHEN 650 MG: 325 TABLET ORAL at 00:07

## 2024-11-04 RX ADMIN — CITALOPRAM HYDROBROMIDE 10 MG: 20 TABLET ORAL at 08:35

## 2024-11-04 RX ADMIN — SODIUM CHLORIDE, PRESERVATIVE FREE 10 ML: 5 INJECTION INTRAVENOUS at 08:35

## 2024-11-04 ASSESSMENT — PAIN DESCRIPTION - LOCATION
LOCATION: GENERALIZED
LOCATION: ABDOMEN
LOCATION: GENERALIZED

## 2024-11-04 ASSESSMENT — PAIN SCALES - GENERAL
PAINLEVEL_OUTOF10: 10
PAINLEVEL_OUTOF10: 9
PAINLEVEL_OUTOF10: 10

## 2024-11-04 ASSESSMENT — PAIN DESCRIPTION - DESCRIPTORS
DESCRIPTORS: ACHING;THROBBING
DESCRIPTORS: ACHING
DESCRIPTORS: ACHING

## 2024-11-04 ASSESSMENT — PAIN DESCRIPTION - ORIENTATION: ORIENTATION: ANTERIOR

## 2024-11-04 NOTE — PROGRESS NOTES
0217: perfect serve to JOSE ANGEL Matthews:  pt is asking if we could do another urine drug screen, she tested positive for cocaine in the ED and states \"she hasn't done cocaine since she was 20\". She has not voided since being admitted to the unit, even after continuous fluids. We are going to bladder scan her.     Orders placed for new home med.     0243: perfect serve to JOSE ANGEL Matthews: The bladder scan was 154 mL. After doing the admission questions, pt asked for her PRN 0.5 mg dilaudid. When I came back to give the dilaudid, pt was arousable and oriented but is increasingly more drowsy and would immediently fall back to asleep. She had gone to the bathroom just before the admission questions. We asked her if she took anything or had anything in the bathroom and she denied. Her HR is 74 and BP is100/50. I did not give her the PRN dilaudid.    No new orders.

## 2024-11-04 NOTE — PROGRESS NOTES
[facility] Adult  [facility]ist [facility]                                                                                          Hospitalist Progress Note  Cecy Sher, APRN - NP  Office Phone: [phone]        Date of Service:  2024  NAME:  Marisol Barros  :  1995  MRN:  [id number]       Admission Summary:   Marisol Barros is a 29 y.o. female with apmhx anemia,, crohns disease, GERD, PCOS, and iron deficiency anemia who presents with abdominal pain and is being admitted for Crohns flare.  Pt. Is lethargic, but states she hasn't seen GI in 2 years.  She was treated with steroids and another medication in the  past.  She is no currently taking any medications.     IN the ED, She was tachycardic to 115.  Other VSS>  Labs showed Hgb 10.5.  CT abdomen/pelvis showed moderate acute colitis involving the left colon and rectum. And right adnexal corpus luteal versus hemorrhagic cyst.  UDS was + for cocaine, opiates, and benzodiazepines.     In the ED, she received zosyn, oxycodone, and 1 Lns.    Interval history / Subjective:      Abd pain 12/10, just medicated    Pending Gi consult    Patient endorses she has been having fever up to 103.2 for about 3 weeks.  Evaluated at [facility] on 10/10 for dental pain/dental decay, was discharged with oxycodone and penicillin.  Patient was not given prednisone at that time as she stated.  Patient was taking Tylenol for fevers however this was not helping her so she started taking Motrin.  Patient is not a smoker.  Colonoscopy more than 5 years ago.  Last seen by [facility].  She was previously on mesalamine, this was not helpful to her.  Would like to start on a biologic.      Assessment & Plan:     Crohns colitis  - CT abdomen/pelvis with  moderate acute colitis involving the left colon and rectum. And right adnexal corpus luteal versus hemorrhagic cyst  - s/p 1L normal saline  - continue zosyn  - pain control  - consult GI     polysubstance use  - UDS + for  LENGTH OF STAY: 3  ACTUAL LENGTH OF STAY:          1                 Cecy Sher, APRN - NP

## 2024-11-04 NOTE — H&P
History and Physical    Date of Service:  11/3/2024  Primary Care Provider: Tyesha Wright DNP  Source of information: patient, electronic medical record    Chief Complaint: Generalized Body Aches, Fever, and Fatigue      History of Presenting Illness:   Marisol Barros is a 29 y.o. female with apmhx anemia,, crohns disease, GERD, PCOS, and iron deficiency anemia who presents with abdominal pain and is being admitted for Crohns flare.  Pt. Is lethargic, but states she hasn't seen GI in 2 years.  She was treated with steroids and another medication in the  past.  She is no currently taking any medications.    IN the ED, She was tachycardic to 115.  Other VSS>  Labs showed Hgb 10.5.  CT abdomen/pelvis showed moderate acute colitis involving the left colon and rectum. And right adnexal corpus luteal versus hemorrhagic cyst.  UDS was + for cocaine, opiates, and benzodiazepines.    In the ED, she received zosyn, oxycodone, and 1 Lns.       REVIEW OF SYSTEMS:  A comprehensive review of systems was negative except for that written in the History of Present Illness.     Past Medical History:   Diagnosis Date    Anemia     receiving iron transfusions- last one 10/20/2017    Arthritis     Cancer (HCC) 2021    CERVICAL - pt states she never had cervical cancer and it was an abnormal pap smear that had an indicator for cervical cancer    COVID-19 virus infection 2021    H/O COVID 19 INFECTION 2021    Crohn's colitis (HCC)     GERD (gastroesophageal reflux disease)     H/O  section     Herpes simplex virus (HSV) infection     just oral cold sores    Infertility, female     due to PCOS and chron's    Iron deficiency     BLOOD TRANSFUSION     PCOS (polycystic ovarian syndrome)     Placental abruption     Polycystic disease, ovaries     Rhesus isoimmunization affecting pregnancy       Past Surgical History:   Procedure Laterality Date    BREAST SURGERY Bilateral 10/26/2021    BILATERAL BREAST  Home      Signed By: Katina Arauz MD     November 3, 2024         Please note that this dictation may have been completed with Dragon, the computer voice recognition software.  Quite often unanticipated grammatical, syntax, homophones, and other interpretive errors are inadvertently transcribed by the computer software.  Please disregard these errors.  Please excuse any errors that have escaped final proofreading.

## 2024-11-04 NOTE — PROGRESS NOTES
Spiritual Health History and Assessment/Progress Note  [facility]    Spiritual/Emotional Needs,  ,  ,      Name: Marisol Barros MRN: [id number]    Age: 29 y.o.     Sex: female   Language: English   Rastafari: None   Colitis     Date: 11/4/2024            Total Time Calculated: 30 min              Spiritual Assessment began in [facility] 5E1 SURGICAL UNIT        Referral/Consult From: Nurse   Encounter Overview/Reason: Spiritual/Emotional Needs  Service Provided For: Patient    Genesis, Belief, Meaning:   Patient has beliefs or practices that help with coping during difficult times  Family/Friends No family/friends present      Importance and Influence:  Patient unable to assess at this time  Family/Friends No family/friends present    Community:  Patient feels supported by her sister but also disconnected from some of those around her.  Family/Friends No family/friends present    Assessment and Plan of Care:     Patient Interventions include: Facilitated expression of thoughts and feelings, Explored spiritual coping/struggle/distress, Engaged in theological reflection, and Affirmed coping skills/support systems  Family/Friends Interventions include: No family/friends present    Patient Plan of Care: Spiritual Care available upon further referral  Family/Friends Plan of Care: No family/friends present    Electronically signed by Amanuel Marrerolain Resident on 11/4/2024 at 4:24 PM
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
## 2024-11-05 LAB
-: NORMAL
ANION GAP SERPL CALC-SCNC: 7 MMOL/L (ref 2–12)
BASOPHILS # BLD: 0 K/UL (ref 0–0.1)
BASOPHILS NFR BLD: 0 % (ref 0–1)
BUN SERPL-MCNC: 9 MG/DL (ref 6–20)
BUN/CREAT SERPL: 16 (ref 12–20)
C COLI+JEJUNI TUF STL QL NAA+PROBE: NEGATIVE
C DIFF GDH STL QL: NEGATIVE
C DIFF TOX A+B STL QL IA: NEGATIVE
C DIFF TOXIN INTERPRETATION: NORMAL
CALCIUM SERPL-MCNC: 8.3 MG/DL (ref 8.5–10.1)
CHLORIDE SERPL-SCNC: 109 MMOL/L (ref 97–108)
CO2 SERPL-SCNC: 24 MMOL/L (ref 21–32)
CREAT SERPL-MCNC: 0.58 MG/DL (ref 0.55–1.02)
DIFFERENTIAL METHOD BLD: ABNORMAL
EC STX1+STX2 GENES STL QL NAA+PROBE: NEGATIVE
EOSINOPHIL # BLD: 0 K/UL (ref 0–0.4)
EOSINOPHIL NFR BLD: 0 % (ref 0–7)
ERYTHROCYTE [DISTWIDTH] IN BLOOD BY AUTOMATED COUNT: 15.9 % (ref 11.5–14.5)
ETEC ELTA+ESTB GENES STL QL NAA+PROBE: NEGATIVE
GLUCOSE SERPL-MCNC: 132 MG/DL (ref 65–100)
HAV IGM SER QL: NONREACTIVE
HBV CORE IGM SER QL: NONREACTIVE
HBV SURFACE AG SER QL: <0.1 INDEX
HBV SURFACE AG SER QL: NEGATIVE
HCT VFR BLD AUTO: 27.5 % (ref 35–47)
HCV AB SER IA-ACNC: <0.02 INDEX
HCV AB SERPL QL IA: NONREACTIVE
HGB BLD-MCNC: 8.5 G/DL (ref 11.5–16)
HIV 1+2 AB+HIV1 P24 AG SERPL QL IA: NONREACTIVE
HIV 1/2 RESULT COMMENT: NORMAL
IMM GRANULOCYTES # BLD AUTO: 0 K/UL (ref 0–0.04)
IMM GRANULOCYTES NFR BLD AUTO: 0 % (ref 0–0.5)
LYMPHOCYTES # BLD: 0.6 K/UL (ref 0.8–3.5)
LYMPHOCYTES NFR BLD: 20 % (ref 12–49)
MCH RBC QN AUTO: 25.9 PG (ref 26–34)
MCHC RBC AUTO-ENTMCNC: 30.9 G/DL (ref 30–36.5)
MCV RBC AUTO: 83.8 FL (ref 80–99)
MONOCYTES # BLD: 0.1 K/UL (ref 0–1)
MONOCYTES NFR BLD: 4 % (ref 5–13)
NEUTS SEG # BLD: 2.3 K/UL (ref 1.8–8)
NEUTS SEG NFR BLD: 75 % (ref 32–75)
NRBC # BLD: 0 K/UL (ref 0–0.01)
NRBC BLD-RTO: 0 PER 100 WBC
P SHIGELLOIDES DNA STL QL NAA+PROBE: NEGATIVE
PLATELET # BLD AUTO: 279 K/UL (ref 150–400)
PMV BLD AUTO: 10.2 FL (ref 8.9–12.9)
POTASSIUM SERPL-SCNC: 4.5 MMOL/L (ref 3.5–5.1)
RBC # BLD AUTO: 3.28 M/UL (ref 3.8–5.2)
SALMONELLA SP SPAO STL QL NAA+PROBE: NEGATIVE
SHIGELLA SP+EIEC IPAH STL QL NAA+PROBE: NEGATIVE
SODIUM SERPL-SCNC: 140 MMOL/L (ref 136–145)
V CHOL+PARA+VUL DNA STL QL NAA+NON-PROBE: NEGATIVE
WBC # BLD AUTO: 3.1 K/UL (ref 3.6–11)
Y ENTEROCOL DNA STL QL NAA+NON-PROBE: NEGATIVE

## 2024-11-05 PROCEDURE — 87324 CLOSTRIDIUM AG IA: CPT

## 2024-11-05 PROCEDURE — 05HY33Z INSERTION OF INFUSION DEVICE INTO UPPER VEIN, PERCUTANEOUS APPROACH: ICD-10-PCS | Performed by: FAMILY MEDICINE

## 2024-11-05 PROCEDURE — 85025 COMPLETE CBC W/AUTO DIFF WBC: CPT

## 2024-11-05 PROCEDURE — 2580000003 HC RX 258: Performed by: FAMILY MEDICINE

## 2024-11-05 PROCEDURE — 6370000000 HC RX 637 (ALT 250 FOR IP): Performed by: NURSE PRACTITIONER

## 2024-11-05 PROCEDURE — 87389 HIV-1 AG W/HIV-1&-2 AB AG IA: CPT

## 2024-11-05 PROCEDURE — 2580000003 HC RX 258

## 2024-11-05 PROCEDURE — 6360000002 HC RX W HCPCS: Performed by: FAMILY MEDICINE

## 2024-11-05 PROCEDURE — 36415 COLL VENOUS BLD VENIPUNCTURE: CPT

## 2024-11-05 PROCEDURE — 80074 ACUTE HEPATITIS PANEL: CPT

## 2024-11-05 PROCEDURE — 6370000000 HC RX 637 (ALT 250 FOR IP): Performed by: FAMILY MEDICINE

## 2024-11-05 PROCEDURE — 83993 ASSAY FOR CALPROTECTIN FECAL: CPT

## 2024-11-05 PROCEDURE — 80048 BASIC METABOLIC PNL TOTAL CA: CPT

## 2024-11-05 PROCEDURE — 87449 NOS EACH ORGANISM AG IA: CPT

## 2024-11-05 PROCEDURE — 6370000000 HC RX 637 (ALT 250 FOR IP)

## 2024-11-05 PROCEDURE — 1200000000 HC SEMI PRIVATE

## 2024-11-05 PROCEDURE — 76937 US GUIDE VASCULAR ACCESS: CPT

## 2024-11-05 PROCEDURE — 6360000002 HC RX W HCPCS

## 2024-11-05 PROCEDURE — 87506 IADNA-DNA/RNA PROBE TQ 6-11: CPT

## 2024-11-05 PROCEDURE — 2580000003 HC RX 258: Performed by: NURSE PRACTITIONER

## 2024-11-05 PROCEDURE — 6360000002 HC RX W HCPCS: Performed by: NURSE PRACTITIONER

## 2024-11-05 RX ORDER — LEVOFLOXACIN 5 MG/ML
500 INJECTION, SOLUTION INTRAVENOUS EVERY 24 HOURS
Status: DISCONTINUED | OUTPATIENT
Start: 2024-11-05 | End: 2024-11-07 | Stop reason: HOSPADM

## 2024-11-05 RX ORDER — LORAZEPAM 2 MG/ML
0.5 INJECTION INTRAMUSCULAR EVERY 6 HOURS PRN
Status: DISCONTINUED | OUTPATIENT
Start: 2024-11-05 | End: 2024-11-05

## 2024-11-05 RX ORDER — LORAZEPAM 2 MG/ML
0.5 INJECTION INTRAMUSCULAR EVERY 4 HOURS PRN
Status: DISCONTINUED | OUTPATIENT
Start: 2024-11-05 | End: 2024-11-07 | Stop reason: HOSPADM

## 2024-11-05 RX ADMIN — HYDROMORPHONE HYDROCHLORIDE 0.5 MG: 1 INJECTION, SOLUTION INTRAMUSCULAR; INTRAVENOUS; SUBCUTANEOUS at 07:57

## 2024-11-05 RX ADMIN — CITALOPRAM HYDROBROMIDE 10 MG: 20 TABLET ORAL at 08:41

## 2024-11-05 RX ADMIN — SODIUM CHLORIDE, PRESERVATIVE FREE 10 ML: 5 INJECTION INTRAVENOUS at 09:09

## 2024-11-05 RX ADMIN — ACETAMINOPHEN 650 MG: 325 TABLET ORAL at 08:41

## 2024-11-05 RX ADMIN — PIPERACILLIN AND TAZOBACTAM 3375 MG: 3; .375 INJECTION, POWDER, FOR SOLUTION INTRAVENOUS at 04:59

## 2024-11-05 RX ADMIN — ONDANSETRON 4 MG: 2 INJECTION INTRAMUSCULAR; INTRAVENOUS at 07:57

## 2024-11-05 RX ADMIN — SODIUM CHLORIDE, PRESERVATIVE FREE 10 ML: 5 INJECTION INTRAVENOUS at 20:27

## 2024-11-05 RX ADMIN — WATER 30 MG: 1 INJECTION INTRAMUSCULAR; INTRAVENOUS; SUBCUTANEOUS at 16:45

## 2024-11-05 RX ADMIN — WATER 30 MG: 1 INJECTION INTRAMUSCULAR; INTRAVENOUS; SUBCUTANEOUS at 04:59

## 2024-11-05 RX ADMIN — HYDROMORPHONE HYDROCHLORIDE 0.5 MG: 1 INJECTION, SOLUTION INTRAMUSCULAR; INTRAVENOUS; SUBCUTANEOUS at 22:17

## 2024-11-05 RX ADMIN — HYDROMORPHONE HYDROCHLORIDE 0.5 MG: 1 INJECTION, SOLUTION INTRAMUSCULAR; INTRAVENOUS; SUBCUTANEOUS at 14:00

## 2024-11-05 RX ADMIN — ACETAMINOPHEN 650 MG: 325 TABLET ORAL at 20:17

## 2024-11-05 RX ADMIN — SODIUM CHLORIDE: 9 INJECTION, SOLUTION INTRAVENOUS at 03:46

## 2024-11-05 RX ADMIN — LEVOFLOXACIN 500 MG: 5 INJECTION, SOLUTION INTRAVENOUS at 13:31

## 2024-11-05 RX ADMIN — LORAZEPAM 0.5 MG: 2 INJECTION INTRAMUSCULAR; INTRAVENOUS at 09:09

## 2024-11-05 RX ADMIN — POLYETHYLENE GLYCOL-3350 AND ELECTROLYTES 4000 ML: 236; 6.74; 5.86; 2.97; 22.74 POWDER, FOR SOLUTION ORAL at 16:48

## 2024-11-05 RX ADMIN — LORAZEPAM 0.5 MG: 2 INJECTION INTRAMUSCULAR; INTRAVENOUS at 12:42

## 2024-11-05 RX ADMIN — LORAZEPAM 0.5 MG: 2 INJECTION INTRAMUSCULAR; INTRAVENOUS at 17:01

## 2024-11-05 RX ADMIN — HYDROMORPHONE HYDROCHLORIDE 0.5 MG: 1 INJECTION, SOLUTION INTRAMUSCULAR; INTRAVENOUS; SUBCUTANEOUS at 17:55

## 2024-11-05 ASSESSMENT — PAIN DESCRIPTION - LOCATION
LOCATION: ABDOMEN;GENERALIZED
LOCATION: ABDOMEN
LOCATION: ABDOMEN
LOCATION: GENERALIZED;ABDOMEN

## 2024-11-05 ASSESSMENT — PAIN SCALES - GENERAL
PAINLEVEL_OUTOF10: 10
PAINLEVEL_OUTOF10: 0
PAINLEVEL_OUTOF10: 5
PAINLEVEL_OUTOF10: 10

## 2024-11-05 ASSESSMENT — PAIN DESCRIPTION - DESCRIPTORS
DESCRIPTORS: ACHING
DESCRIPTORS: ACHING

## 2024-11-05 ASSESSMENT — PAIN DESCRIPTION - FREQUENCY: FREQUENCY: CONTINUOUS

## 2024-11-05 ASSESSMENT — PAIN DESCRIPTION - ORIENTATION
ORIENTATION: RIGHT;LEFT
ORIENTATION: RIGHT;LEFT

## 2024-11-05 ASSESSMENT — PAIN DESCRIPTION - ONSET: ONSET: ON-GOING

## 2024-11-05 NOTE — PROCEDURES
PROCEDURE NOTE  Date: 11/5/2024   Name: Marisol Barros  YOB: 1995    Procedures    Ultrasound guided peripheral IV placed on left mid forearm See LDA.  RN made aware.     eRhana Crenshaw RN  VAT

## 2024-11-05 NOTE — PROGRESS NOTES
flagyl, will change to levaquin for anaerobe coverage  - pain control  - GI following, colonoscopy on Wednesday     polysubstance use  - UDS + for benzos, cocaine, and opiates  - pt denies cocaine or benzo use  - 11/4 used heroin via inhalation in bathroom  - 11/5 consult addiction medicine, ativan for withdrawal sx's in the interim. Discussed with nursing NOT to administer ativan and dilaudid together        Code status: Full  Prophylaxis: SCD's  Care Plan discussed with: patient, nursing, attending  Anticipated Disposition: home when stable  Inpatient  Cardiac monitoring: Telemetry  Central Line:            Social Determinants of Health     Tobacco Use: Medium Risk (7/25/2024)    Patient History     Smoking Tobacco Use: Former     Smokeless Tobacco Use: Never     Passive Exposure: Not on file   Alcohol Use: Not At Risk (7/25/2024)    AUDIT-C     Frequency of Alcohol Consumption: Never     Average Number of Drinks: Patient does not drink     Frequency of Binge Drinking: Never   Financial Resource Strain: Not on file   Food Insecurity: No Food Insecurity (11/4/2024)    Hunger Vital Sign     Worried About Running Out of Food in the Last Year: Never true     Ran Out of Food in the Last Year: Never true   Transportation Needs: No Transportation Needs (11/4/2024)    PRAPARE - Transportation     Lack of Transportation (Medical): No     Lack of Transportation (Non-Medical): No   Physical Activity: Not on file   Stress: Not on file   Social Connections: Moderately Isolated (11/5/2024)    Social Connections ([facility] HRSN)     If for any reason you need help with day-to-day activities such as bathing, preparing meals, shopping, managing finances, etc., do you get the help you need?: Not on file   Intimate Partner Violence: Not on file   Depression: Not on file   Housing Stability: Low Risk  (11/4/2024)    Housing Stability Vital Sign     Unable to Pay for Housing in the Last Year: No     Number of Times Moved in the Last Year: 0  \"APTT\" in the last 72 hours.    Invalid input(s): \"PTP\"   No results for input(s): \"TIBC\" in the last 72 hours.    Invalid input(s): \"FE\", \"PSAT\", \"FERR\"   No results found for: \"RBCF\"   No results for input(s): \"PH\", \"PCO2\", \"PO2\" in the last 72 hours.  No results for input(s): \"CPK\" in the last 72 hours.    Invalid input(s): \"CPKMB\", \"CKNDX\", \"TROIQ\"  No results found for: \"CHOL\", \"CHLST\", \"CHOLV\", \"HDL\", \"HDLC\", \"LDL\", \"LDLC\"  No results found for: \"GLUCPOC\"  [unfilled]    Notes reviewed from all clinical/nonclinical/nursing services involved in patient's clinical care. Care coordination discussions were held with appropriate clinical/nonclinical/ nursing providers based on care coordination needs.         Patients current active Medications were reviewed, considered, added and adjusted based on the clinical condition today.      Home Medications were reconciled to the best of my ability given all available resources at the time of admission. Route is PO if not otherwise noted.      Admission Status:[id number]:::1}      Medications Reviewed:     Current Facility-Administered Medications   Medication Dose Route Frequency    LORazepam (ATIVAN) injection 0.5 mg  0.5 mg IntraVENous Q6H PRN    citalopram (CELEXA) tablet 10 mg  10 mg Oral Daily    piperacillin-tazobactam (ZOSYN) 3,375 mg in sodium chloride 0.9 % 50 mL IVPB (mini-bag)  3,375 mg IntraVENous Q8H    methylPREDNISolone sodium succ (SOLU-MEDROL) 30 mg in sterile water 0.75 mL injection  30 mg IntraVENous Q12H    polyethylene glycol (GoLYTELY) solution 4,000 mL  4,000 mL Oral Once    sodium chloride flush 0.9 % injection 5-40 mL  5-40 mL IntraVENous 2 times per day    sodium chloride flush 0.9 % injection 5-40 mL  5-40 mL IntraVENous PRN    0.9 % sodium chloride infusion   IntraVENous PRN    ondansetron (ZOFRAN-ODT) disintegrating tablet 4 mg  4 mg Oral Q8H PRN    Or    ondansetron (ZOFRAN) injection 4 mg  4 mg IntraVENous Q6H PRN    polyethylene glycol (GLYCOLAX)

## 2024-11-05 NOTE — PROGRESS NOTES
Patient removed her remote telemetry. Charge RN went in room and patient is stating that she is not going to wear her remote telemetry until she speak with a doctor.

## 2024-11-05 NOTE — PROGRESS NOTES
2210    Patient was rounded on by writer. Visitor is present at bedside. Patient Is assessed to be alert and oriented x4 and complained of pain, displayed facial grimacing and displayed guarding of her abdomen with her left arm. Patient asked nurse for PRN dilaudid. Vital signs were noted to be in normal range. IV dilaudid drawn from the omnicell by writer. Patient asked if she could go to the bathroom first before taking dilaudid. On the way to the bathroom, patient took her purse and ambulated to the bathroom safely. Writer advised patient to call when she returned from the bathroom.     Patient was noted to be in the bathroom for an extended period of time. Patient called and writer came in with PRN IV dilaudid. Patient's mental status was observed to have changed from her baseline. She was in deep sleep but arousable. Nursing judgement was made to hold IV dilaudid due to safety concerns and lethargy. Reason for holding explained by the writer to the patient. Patient verbalized understanding. IV dilaudid returned to the omnicell by writer.        2330  BP was noted to be 93/56. Patient asked again for PRN IV dilaudid. Nursing judgement made to hold due to low BP.  Patient verbalized understanding.     0255  Pulse was noted to be 46 on vital signs machine. This is further confirmed by remote portable telemetry box reading. Decline in pulse has been observed since beginning of shift. Patient had just come from the bathroom prior to having vital signs taken. Patient again appeared lethargic but arousable. Patient advised by nurse not to ambulate on her own and was placed on bed alarm due to safety concerns.     0337  Pulse was noted to be 39 and in sinus bradycardia via remote portable telemetry.     0415  Nursing judgement was made to call supervisor with concerns of unauthorized drug use and declining status. Supervisor agreed to come up to patient's room with security personnel. Patient was talked to by

## 2024-11-05 NOTE — PROGRESS NOTES
[facility]  [address]  [place] [address] [phone]                     GI PROGRESS NOTE    Patient Name: Marisol Barros      : 1995      MRN: [id number]  Admit Date: 11/3/2024  Today's Date: 2024  CC: ENRIQUE ochoa    Subjective:     Patient is lethargic and falling asleep during conversation. Continues to c/o severe abdominal pain.     Per primary team and RN- overnight patient was ingesting heroin via inhalation. Addiction medicine has been consulted.     Objective:     Blood pressure 115/70, pulse (!) 44, temperature 98.1 °F (36.7 °C), temperature source Oral, resp. rate 22, height 1.524 m (5'), weight 68.9 kg (151 lb 14.4 oz), SpO2 99%.    Physical Exam:  General appearance: no distress, appears stated age  Skin: Extremities and face reveal no rashes.   HEENT: Sclerae anicteric.   Cardiovascular: Regular rate and rhythm.   Respiratory: Comfortable breathing with no accessory muscle use.   GI: Abdomen nondistended, soft, and tender. Normal active bowel sounds.  Rectal: Deferred   Musculoskeletal: No pitting edema of the lower legs.    Neurological: lethargic  Psychiatric: Mood appears appropriate with good judgement.  No anxiety or agitation.      Data Review:    Recent Results (from the past 24 hour(s))   Basic Metabolic Panel    Collection Time: 24  3:43 AM   Result Value Ref Range    Sodium 140 136 - 145 mmol/L    Potassium 4.5 3.5 - 5.1 mmol/L    Chloride 109 (H) 97 - 108 mmol/L    CO2 24 21 - 32 mmol/L    Anion Gap 7 2 - 12 mmol/L    Glucose 132 (H) 65 - 100 mg/dL    BUN 9 6 - 20 MG/DL    Creatinine 0.58 0.55 - 1.02 MG/DL    BUN/Creatinine Ratio 16 12 - 20      Est, Glom Filt Rate >90 >60 ml/min/1.73m2    Calcium 8.3 (L) 8.5 - 10.1 MG/DL   CBC with Auto Differential    Collection Time: 24  3:43 AM   Result Value Ref Range    WBC 3.1 (L) 3.6 - 11.0 K/uL    RBC 3.28 (L) 3.80 - 5.20 M/uL    Hemoglobin 8.5 (L) 11.5 - 16.0 g/dL    Hematocrit 27.5 (L)  as well as your Preventive Care list are included within your After Visit Summary for your review. Other Preventive Recommendations:    A preventive eye exam performed by an eye specialist is recommended every 1-2 years to screen for glaucoma; cataracts, macular degeneration, and other eye disorders. A preventive dental visit is recommended every 6 months. Try to get at least 150 minutes of exercise per week or 10,000 steps per day on a pedometer . Order or download the FREE \"Exercise & Physical Activity: Your Everyday Guide\" from [employer]. Call 5-152.973.5418 or search [employer] online. You need 3672-5908 mg of calcium and 0070-9749 IU of vitamin D per day. It is possible to meet your calcium requirement with diet alone, but a vitamin D supplement is usually necessary to meet this goal.  When exposed to the sun, use a sunscreen that protects against both UVA and UVB radiation with an SPF of 30 or greater. Reapply every 2 to 3 hours or after sweating, drying off with a towel, or swimming. Always wear a seat belt when traveling in a car. Always wear a helmet when riding a bicycle or motorcycle.

## 2024-11-06 LAB
ANION GAP SERPL CALC-SCNC: 6 MMOL/L (ref 2–12)
BASOPHILS # BLD: 0 K/UL (ref 0–0.1)
BASOPHILS NFR BLD: 0 % (ref 0–1)
BUN SERPL-MCNC: 6 MG/DL (ref 6–20)
BUN/CREAT SERPL: 8 (ref 12–20)
CALCIUM SERPL-MCNC: 9.4 MG/DL (ref 8.5–10.1)
CHLORIDE SERPL-SCNC: 108 MMOL/L (ref 97–108)
CO2 SERPL-SCNC: 27 MMOL/L (ref 21–32)
CREAT SERPL-MCNC: 0.78 MG/DL (ref 0.55–1.02)
DIFFERENTIAL METHOD BLD: ABNORMAL
EOSINOPHIL # BLD: 0 K/UL (ref 0–0.4)
EOSINOPHIL NFR BLD: 0 % (ref 0–7)
ERYTHROCYTE [DISTWIDTH] IN BLOOD BY AUTOMATED COUNT: 16.1 % (ref 11.5–14.5)
GLUCOSE SERPL-MCNC: 113 MG/DL (ref 65–100)
HCT VFR BLD AUTO: 29.8 % (ref 35–47)
HGB BLD-MCNC: 9.1 G/DL (ref 11.5–16)
IMM GRANULOCYTES # BLD AUTO: 0 K/UL (ref 0–0.04)
IMM GRANULOCYTES NFR BLD AUTO: 0 % (ref 0–0.5)
LYMPHOCYTES # BLD: 0.9 K/UL (ref 0.8–3.5)
LYMPHOCYTES NFR BLD: 12 % (ref 12–49)
MCH RBC QN AUTO: 25.7 PG (ref 26–34)
MCHC RBC AUTO-ENTMCNC: 30.5 G/DL (ref 30–36.5)
MCV RBC AUTO: 84.2 FL (ref 80–99)
MONOCYTES # BLD: 0.2 K/UL (ref 0–1)
MONOCYTES NFR BLD: 2 % (ref 5–13)
NEUTS SEG # BLD: 6.3 K/UL (ref 1.8–8)
NEUTS SEG NFR BLD: 86 % (ref 32–75)
NRBC # BLD: 0 K/UL (ref 0–0.01)
NRBC BLD-RTO: 0 PER 100 WBC
PLATELET # BLD AUTO: 420 K/UL (ref 150–400)
PMV BLD AUTO: 10.3 FL (ref 8.9–12.9)
POTASSIUM SERPL-SCNC: 3.8 MMOL/L (ref 3.5–5.1)
RBC # BLD AUTO: 3.54 M/UL (ref 3.8–5.2)
SODIUM SERPL-SCNC: 141 MMOL/L (ref 136–145)
WBC # BLD AUTO: 7.4 K/UL (ref 3.6–11)

## 2024-11-06 PROCEDURE — 6370000000 HC RX 637 (ALT 250 FOR IP): Performed by: NURSE PRACTITIONER

## 2024-11-06 PROCEDURE — 6360000002 HC RX W HCPCS: Performed by: INTERNAL MEDICINE

## 2024-11-06 PROCEDURE — 6360000002 HC RX W HCPCS

## 2024-11-06 PROCEDURE — 6370000000 HC RX 637 (ALT 250 FOR IP)

## 2024-11-06 PROCEDURE — 6360000002 HC RX W HCPCS: Performed by: NURSE PRACTITIONER

## 2024-11-06 PROCEDURE — 36415 COLL VENOUS BLD VENIPUNCTURE: CPT

## 2024-11-06 PROCEDURE — 2580000003 HC RX 258: Performed by: NURSE PRACTITIONER

## 2024-11-06 PROCEDURE — 1200000000 HC SEMI PRIVATE

## 2024-11-06 PROCEDURE — 85025 COMPLETE CBC W/AUTO DIFF WBC: CPT

## 2024-11-06 PROCEDURE — 6360000002 HC RX W HCPCS: Performed by: FAMILY MEDICINE

## 2024-11-06 PROCEDURE — 80048 BASIC METABOLIC PNL TOTAL CA: CPT

## 2024-11-06 PROCEDURE — 2580000003 HC RX 258: Performed by: FAMILY MEDICINE

## 2024-11-06 PROCEDURE — 2580000003 HC RX 258

## 2024-11-06 PROCEDURE — 6370000000 HC RX 637 (ALT 250 FOR IP): Performed by: FAMILY MEDICINE

## 2024-11-06 RX ORDER — BUPRENORPHINE HYDROCHLORIDE AND NALOXONE HYDROCHLORIDE DIHYDRATE 8; 2 MG/1; MG/1
2 TABLET SUBLINGUAL ONCE
Status: COMPLETED | OUTPATIENT
Start: 2024-11-06 | End: 2024-11-06

## 2024-11-06 RX ORDER — NICOTINE 21 MG/24HR
1 PATCH, TRANSDERMAL 24 HOURS TRANSDERMAL DAILY
Status: DISCONTINUED | OUTPATIENT
Start: 2024-11-06 | End: 2024-11-07 | Stop reason: HOSPADM

## 2024-11-06 RX ORDER — BUPRENORPHINE HYDROCHLORIDE AND NALOXONE HYDROCHLORIDE DIHYDRATE 8; 2 MG/1; MG/1
1 TABLET SUBLINGUAL EVERY 6 HOURS PRN
Status: COMPLETED | OUTPATIENT
Start: 2024-11-06 | End: 2024-11-07

## 2024-11-06 RX ORDER — HYDROXYZINE HYDROCHLORIDE 50 MG/1
25 TABLET, FILM COATED ORAL 3 TIMES DAILY PRN
Status: DISCONTINUED | OUTPATIENT
Start: 2024-11-06 | End: 2024-11-07 | Stop reason: HOSPADM

## 2024-11-06 RX ADMIN — LORAZEPAM 0.5 MG: 2 INJECTION INTRAMUSCULAR; INTRAVENOUS at 16:03

## 2024-11-06 RX ADMIN — HYDROMORPHONE HYDROCHLORIDE 0.5 MG: 1 INJECTION, SOLUTION INTRAMUSCULAR; INTRAVENOUS; SUBCUTANEOUS at 06:14

## 2024-11-06 RX ADMIN — NALXONE HYDROCHLORIDE 0.4 MG: 0.4 INJECTION INTRAMUSCULAR; INTRAVENOUS; SUBCUTANEOUS at 10:17

## 2024-11-06 RX ADMIN — WATER 30 MG: 1 INJECTION INTRAMUSCULAR; INTRAVENOUS; SUBCUTANEOUS at 02:18

## 2024-11-06 RX ADMIN — BUPRENORPHINE HYDROCHLORIDE AND NALOXONE HYDROCHLORIDE DIHYDRATE 2 TABLET: 8; 2 TABLET SUBLINGUAL at 13:16

## 2024-11-06 RX ADMIN — LORAZEPAM 0.5 MG: 2 INJECTION INTRAMUSCULAR; INTRAVENOUS at 23:51

## 2024-11-06 RX ADMIN — LORAZEPAM 0.5 MG: 2 INJECTION INTRAMUSCULAR; INTRAVENOUS at 00:05

## 2024-11-06 RX ADMIN — SODIUM CHLORIDE: 9 INJECTION, SOLUTION INTRAVENOUS at 00:17

## 2024-11-06 RX ADMIN — ONDANSETRON 4 MG: 2 INJECTION INTRAMUSCULAR; INTRAVENOUS at 02:24

## 2024-11-06 RX ADMIN — CITALOPRAM HYDROBROMIDE 10 MG: 20 TABLET ORAL at 08:22

## 2024-11-06 RX ADMIN — BUPRENORPHINE HYDROCHLORIDE AND NALOXONE HYDROCHLORIDE DIHYDRATE 1 TABLET: 8; 2 TABLET SUBLINGUAL at 20:14

## 2024-11-06 RX ADMIN — HYDROMORPHONE HYDROCHLORIDE 0.5 MG: 1 INJECTION, SOLUTION INTRAMUSCULAR; INTRAVENOUS; SUBCUTANEOUS at 02:18

## 2024-11-06 RX ADMIN — HYDROXYZINE HYDROCHLORIDE 25 MG: 50 TABLET, FILM COATED ORAL at 20:59

## 2024-11-06 RX ADMIN — NALXONE HYDROCHLORIDE 0.4 MG: 0.4 INJECTION INTRAMUSCULAR; INTRAVENOUS; SUBCUTANEOUS at 17:49

## 2024-11-06 RX ADMIN — ACETAMINOPHEN 650 MG: 325 TABLET ORAL at 21:01

## 2024-11-06 RX ADMIN — LORAZEPAM 0.5 MG: 2 INJECTION INTRAMUSCULAR; INTRAVENOUS at 11:42

## 2024-11-06 RX ADMIN — LORAZEPAM 0.5 MG: 2 INJECTION INTRAMUSCULAR; INTRAVENOUS at 04:08

## 2024-11-06 RX ADMIN — LORAZEPAM 0.5 MG: 2 INJECTION INTRAMUSCULAR; INTRAVENOUS at 20:13

## 2024-11-06 RX ADMIN — WATER 30 MG: 1 INJECTION INTRAMUSCULAR; INTRAVENOUS; SUBCUTANEOUS at 16:03

## 2024-11-06 RX ADMIN — LEVOFLOXACIN 500 MG: 5 INJECTION, SOLUTION INTRAVENOUS at 14:20

## 2024-11-06 RX ADMIN — LORAZEPAM 0.5 MG: 2 INJECTION INTRAMUSCULAR; INTRAVENOUS at 08:22

## 2024-11-06 RX ADMIN — LORAZEPAM 0.5 MG: 2 INJECTION INTRAMUSCULAR; INTRAVENOUS at 10:39

## 2024-11-06 RX ADMIN — NALXONE HYDROCHLORIDE 0.4 MG: 0.4 INJECTION INTRAMUSCULAR; INTRAVENOUS; SUBCUTANEOUS at 10:12

## 2024-11-06 RX ADMIN — SODIUM CHLORIDE, PRESERVATIVE FREE 10 ML: 5 INJECTION INTRAVENOUS at 08:17

## 2024-11-06 RX ADMIN — BUPRENORPHINE HYDROCHLORIDE AND NALOXONE HYDROCHLORIDE DIHYDRATE 1 TABLET: 8; 2 TABLET SUBLINGUAL at 14:17

## 2024-11-06 ASSESSMENT — PAIN SCALES - GENERAL
PAINLEVEL_OUTOF10: 10
PAINLEVEL_OUTOF10: 5
PAINLEVEL_OUTOF10: 10

## 2024-11-06 ASSESSMENT — PAIN DESCRIPTION - LOCATION
LOCATION: ABDOMEN
LOCATION: ABDOMEN;GENERALIZED

## 2024-11-06 NOTE — PROGRESS NOTES
to administer ativan and dilaudid together  -11/6  patient found off floor locked in ED restroom, narcan given x2  -addiction medicine consulted, methadone ordered  -bedside sitter ordered          Code status: full code  Prophylaxis: scd  Care Plan discussed with: nursing  Anticipated Disposition: tbd  Inpatient  Cardiac monitoring: Telemetry     Principal Problem:    Colitis  Resolved Problems:    * No resolved hospital problems. *         Social Determinants of Health     Tobacco Use: Medium Risk (7/25/2024)    Patient History     Smoking Tobacco Use: Former     Smokeless Tobacco Use: Never     Passive Exposure: Not on file   Alcohol Use: Not At Risk (7/25/2024)    AUDIT-C     Frequency of Alcohol Consumption: Never     Average Number of Drinks: Patient does not drink     Frequency of Binge Drinking: Never   Financial Resource Strain: Not on file   Food Insecurity: No Food Insecurity (11/4/2024)    Hunger Vital Sign     Worried About Running Out of Food in the Last Year: Never true     Ran Out of Food in the Last Year: Never true   Transportation Needs: No Transportation Needs (11/4/2024)    PRAPARE - Transportation     Lack of Transportation (Medical): No     Lack of Transportation (Non-Medical): No   Physical Activity: Not on file   Stress: Not on file   Social Connections: Moderately Isolated (11/6/2024)    Social Connections ([facility] HRSN)     If for any reason you need help with day-to-day activities such as bathing, preparing meals, shopping, managing finances, etc., do you get the help you need?: Not on file   Intimate Partner Violence: Not on file   Depression: Not on file   Housing Stability: Low Risk  (11/4/2024)    Housing Stability Vital Sign     Unable to Pay for Housing in the Last Year: No     Number of Times Moved in the Last Year: 0     Homeless in the Last Year: No   Interpersonal Safety: Not At Risk (11/4/2024)    Interpersonal Safety Domain Source: IP Abuse Screening     Physical abuse: Denies          ______________________________________________________________________  EXPECTED LENGTH OF STAY: Unable to retrieve estimated LOS  ACTUAL LENGTH OF STAY:          3                 Gurjit Hartmann, GIRISH - CNP

## 2024-11-06 NOTE — PROGRESS NOTES
Pt became harder to awake. Once aroused pt admitted to taking something when she went to the ED, but not sure what it was. Nurse leader administered 0.8 of narcan per MD request. A white powder substance and black tube was found in pts robe pocket. Pt now has a sitter.

## 2024-11-06 NOTE — CONSULTS
Initial Addiction Medicine Consultation            IDENTIFICATION:    Patient Name  Marisol Barros   Date of Birth 1995   [facility] [id number]   Medical Record Number  [id number]      Age  29 y.o.   PCP Tyesha Wright, JUAN LUIS   Admit date:  11/3/2024    Room Number  504/02  @ [facility]   Date of Service  11/6/2024            ASSESSMENT & PLAN        Marisol Barros, is a 29 y.o.  female with opioid use disorder who reported severe withdrawal this morning prior to overdosing on illicit fentanyl (presumably) and was treated with narcan.     Start buprenorphine/naloxone 16 mg SL once - she agrees to take this.     May use prn buprenorphine/naloxone 8 mg every 6 hours after that dose.     Agree with use of prn lorazepam as ordered to manage withdrawal associated anxiety.      For questions about this protocol see   [email or web address]    She is interested in taking methadone which I will follow up with about tomorrow.     History an exam limited by patient in severe withdrawal.         Fitz Claire MD [facility]  [facility]  [phone]  Fax [phone]    HISTORY         REASON FOR HOSPITALIZATION:  CC: \"Opioid use disorder \".      HISTORY OF PRESENT ILLNESS:    The patient, Marisol Barros, is a 29 y.o.  female with a history of opioid use disorder, reports using using heroin for at least the last 4 years, prior to that was on opioid pain pills.  She initially started using prescribed pain medications related to crohn's disease.    she was admitted related to severe gastrointestinal symptoms and CT finding consistent with acute colitis.    This morning she reported severe withdrawals and was found to have overdosed likely on illicit fentanyl.    The history is limited by being in severe withdrawal and not being willing to talk further.    She did say that she had never engaged in outpatient treatment with Suboxone but  Immature Granulocytes Absolute 11/06/2024 0.0  0.00 - 0.04 K/UL Final    Differential Type 11/06/2024 AUTOMATED    Final                     MEDICATIONS       ALL MEDICATIONS  Current Facility-Administered Medications   Medication Dose Route Frequency    buprenorphine-naloxone (SUBOXONE) 8-2 MG SL tablet 2 tablet  2 tablet SubLINGual Once    buprenorphine-naloxone (SUBOXONE) 8-2 MG SL tablet 1 tablet  1 tablet SubLINGual Q6H PRN    levoFLOXacin (LEVAQUIN) 500 MG/100ML infusion 500 mg  500 mg IntraVENous Q24H    LORazepam (ATIVAN) injection 0.5 mg  0.5 mg IntraVENous Q4H PRN    citalopram (CELEXA) tablet 10 mg  10 mg Oral Daily    methylPREDNISolone sodium succ (SOLU-MEDROL) 30 mg in sterile water 0.75 mL injection  30 mg IntraVENous Q12H    sodium chloride flush 0.9 % injection 5-40 mL  5-40 mL IntraVENous 2 times per day    sodium chloride flush 0.9 % injection 5-40 mL  5-40 mL IntraVENous PRN    0.9 % sodium chloride infusion   IntraVENous PRN    ondansetron (ZOFRAN-ODT) disintegrating tablet 4 mg  4 mg Oral Q8H PRN    Or    ondansetron (ZOFRAN) injection 4 mg  4 mg IntraVENous Q6H PRN    polyethylene glycol (GLYCOLAX) packet 17 g  17 g Oral Daily PRN    acetaminophen (TYLENOL) tablet 650 mg  650 mg Oral Q6H PRN    Or    acetaminophen (TYLENOL) suppository 650 mg  650 mg Rectal Q6H PRN    naloxone (NARCAN) injection 0.4 mg  0.4 mg IntraVENous PRN    0.9 % sodium chloride infusion   IntraVENous Continuous

## 2024-11-06 NOTE — PROGRESS NOTES
[facility]  [address]  [place] [address] [phone]                     GI PROGRESS NOTE    Patient Name: Marisol Barros      : 1995      MRN: [id number]  Admit Date: 11/3/2024  Today's Date: 2024  CC: ENRIQUE flair    Subjective:     Patient is thrashing around in bed on arrival.    Per primary team and RN-patient noted to have been off the floor and ingested substance.  Narcan given x 2.    Objective:     Blood pressure 127/79, pulse (!) 36, temperature 98.8 °F (37.1 °C), temperature source Oral, resp. rate 20, height 1.524 m (5'), weight 68.9 kg (151 lb 14.4 oz), SpO2 100%.    Physical Exam:  General appearance: appears uncomfortable   Skin: Extremities and face reveal no rashes.   HEENT: Sclerae anicteric.   Cardiovascular: Regular rate and rhythm.   Respiratory: Comfortable breathing with no accessory muscle use.   GI: Abdomen nondistended, soft, and tender. Normal active bowel sounds.  Rectal: Deferred   Musculoskeletal: No pitting edema of the lower legs.   Psychiatric: agitated       Data Review:    Recent Results (from the past 24 hour(s))   Hepatitis Panel, Acute    Collection Time: 24  2:43 PM   Result Value Ref Range    Hep A IgM NONREACTIVE NR      -        Hepatitis B Surface Ag <0.10 Index    Hep B S Ag Interp Negative NEG      -        Hep B Core Ab, IgM NONREACTIVE NR      -        Hepatitis C Ab <0.02 Index    Hep C Ab Interp NONREACTIVE NR     HIV 1/2 Ag/Ab, 4TH Generation,W Rflx Confirm    Collection Time: 24  2:43 PM   Result Value Ref Range    HIV 1/2 Interp NONREACTIVE NR      HIV 1/2 Result Comment SEE NOTE     Basic Metabolic Panel    Collection Time: 24 12:16 AM   Result Value Ref Range    Sodium 141 136 - 145 mmol/L    Potassium 3.8 3.5 - 5.1 mmol/L    Chloride 108 97 - 108 mmol/L    CO2 27 21 - 32 mmol/L    Anion Gap 6 2 - 12 mmol/L    Glucose 113 (H) 65 - 100 mg/dL    BUN 6 6 - 20 MG/DL    Creatinine 0.78 0.55 - 1.02

## 2024-11-06 NOTE — PROGRESS NOTES
pt left the unit and was found in the ED bathroom with the door locked. she stated she just wanted to get away and thought she was ok as long as she did not leave the hospital. i told her she can not do that especially after just getting IV medication. she denies using any illegal drugs. Security, nursing sup, and MD made aware.

## 2024-11-07 VITALS
BODY MASS INDEX: 29.82 KG/M2 | DIASTOLIC BLOOD PRESSURE: 64 MMHG | RESPIRATION RATE: 16 BRPM | TEMPERATURE: 100 F | OXYGEN SATURATION: 96 % | HEART RATE: 39 BPM | SYSTOLIC BLOOD PRESSURE: 125 MMHG | WEIGHT: 151.9 LBS | HEIGHT: 60 IN

## 2024-11-07 PROCEDURE — 6360000002 HC RX W HCPCS: Performed by: INTERNAL MEDICINE

## 2024-11-07 PROCEDURE — 2580000003 HC RX 258

## 2024-11-07 PROCEDURE — 6360000002 HC RX W HCPCS

## 2024-11-07 PROCEDURE — 6360000002 HC RX W HCPCS: Performed by: NURSE PRACTITIONER

## 2024-11-07 PROCEDURE — 6370000000 HC RX 637 (ALT 250 FOR IP): Performed by: NURSE PRACTITIONER

## 2024-11-07 PROCEDURE — 6370000000 HC RX 637 (ALT 250 FOR IP)

## 2024-11-07 PROCEDURE — 6370000000 HC RX 637 (ALT 250 FOR IP): Performed by: INTERNAL MEDICINE

## 2024-11-07 RX ORDER — WATER 10 ML/10ML
INJECTION INTRAMUSCULAR; INTRAVENOUS; SUBCUTANEOUS
Status: COMPLETED
Start: 2024-11-07 | End: 2024-11-07

## 2024-11-07 RX ORDER — HYDROXYZINE HYDROCHLORIDE 25 MG/1
25 TABLET, FILM COATED ORAL 3 TIMES DAILY PRN
Qty: 30 TABLET | Refills: 0 | Status: SHIPPED | OUTPATIENT
Start: 2024-11-07 | End: 2024-11-17

## 2024-11-07 RX ORDER — PREDNISONE 20 MG/1
40 TABLET ORAL DAILY
Status: DISCONTINUED | OUTPATIENT
Start: 2024-11-07 | End: 2024-11-07 | Stop reason: HOSPADM

## 2024-11-07 RX ORDER — HYDROXYZINE HYDROCHLORIDE 25 MG/1
25 TABLET, FILM COATED ORAL 3 TIMES DAILY PRN
Qty: 30 TABLET | Refills: 0 | Status: SHIPPED | OUTPATIENT
Start: 2024-11-07 | End: 2024-11-07

## 2024-11-07 RX ORDER — LORAZEPAM 2 MG/ML
2 CONCENTRATE ORAL ONCE
Status: COMPLETED | OUTPATIENT
Start: 2024-11-07 | End: 2024-11-07

## 2024-11-07 RX ORDER — BUPRENORPHINE AND NALOXONE 8; 2 MG/1; MG/1
1 FILM, SOLUBLE BUCCAL; SUBLINGUAL 3 TIMES DAILY
Qty: 21 FILM | Refills: 0 | Status: SHIPPED | OUTPATIENT
Start: 2024-11-07 | End: 2024-11-14

## 2024-11-07 RX ORDER — BUPRENORPHINE HYDROCHLORIDE AND NALOXONE HYDROCHLORIDE DIHYDRATE 8; 2 MG/1; MG/1
1 TABLET SUBLINGUAL ONCE
Status: COMPLETED | OUTPATIENT
Start: 2024-11-07 | End: 2024-11-07

## 2024-11-07 RX ORDER — BUPRENORPHINE HYDROCHLORIDE AND NALOXONE HYDROCHLORIDE DIHYDRATE 8; 2 MG/1; MG/1
1 TABLET SUBLINGUAL 3 TIMES DAILY
Status: DISCONTINUED | OUTPATIENT
Start: 2024-11-07 | End: 2024-11-07 | Stop reason: HOSPADM

## 2024-11-07 RX ORDER — PREDNISONE 5 MG/1
TABLET ORAL
Qty: 252 TABLET | Refills: 0 | Status: SHIPPED | OUTPATIENT
Start: 2024-11-07 | End: 2025-01-02

## 2024-11-07 RX ADMIN — Medication 2 MG: at 10:41

## 2024-11-07 RX ADMIN — BUPRENORPHINE HYDROCHLORIDE AND NALOXONE HYDROCHLORIDE DIHYDRATE 1 TABLET: 8; 2 TABLET SUBLINGUAL at 02:07

## 2024-11-07 RX ADMIN — PREDNISONE 40 MG: 20 TABLET ORAL at 13:00

## 2024-11-07 RX ADMIN — LORAZEPAM 0.5 MG: 2 INJECTION INTRAMUSCULAR; INTRAVENOUS at 15:33

## 2024-11-07 RX ADMIN — HYDROXYZINE HYDROCHLORIDE 25 MG: 50 TABLET, FILM COATED ORAL at 06:09

## 2024-11-07 RX ADMIN — CITALOPRAM HYDROBROMIDE 10 MG: 20 TABLET ORAL at 08:29

## 2024-11-07 RX ADMIN — WATER: 1 INJECTION INTRAMUSCULAR; INTRAVENOUS; SUBCUTANEOUS at 05:07

## 2024-11-07 RX ADMIN — BUPRENORPHINE HYDROCHLORIDE AND NALOXONE HYDROCHLORIDE DIHYDRATE 1 TABLET: 8; 2 TABLET SUBLINGUAL at 08:29

## 2024-11-07 RX ADMIN — LORAZEPAM 0.5 MG: 2 INJECTION INTRAMUSCULAR; INTRAVENOUS at 08:17

## 2024-11-07 RX ADMIN — LORAZEPAM 0.5 MG: 2 INJECTION INTRAMUSCULAR; INTRAVENOUS at 04:52

## 2024-11-07 RX ADMIN — BUPRENORPHINE HYDROCHLORIDE AND NALOXONE HYDROCHLORIDE DIHYDRATE 1 TABLET: 8; 2 TABLET SUBLINGUAL at 14:30

## 2024-11-07 RX ADMIN — WATER 30 MG: 1 INJECTION INTRAMUSCULAR; INTRAVENOUS; SUBCUTANEOUS at 04:56

## 2024-11-07 RX ADMIN — BUPRENORPHINE HYDROCHLORIDE AND NALOXONE HYDROCHLORIDE DIHYDRATE 1 TABLET: 8; 2 TABLET SUBLINGUAL at 10:41

## 2024-11-07 RX ADMIN — LEVOFLOXACIN 500 MG: 5 INJECTION, SOLUTION INTRAVENOUS at 14:35

## 2024-11-07 ASSESSMENT — PAIN DESCRIPTION - LOCATION: LOCATION: ABDOMEN

## 2024-11-07 ASSESSMENT — PAIN SCALES - GENERAL: PAINLEVEL_OUTOF10: 2

## 2024-11-07 NOTE — PROGRESS NOTES
Received multiple messages from nursing and patient overnight and this morning.    Spoke with patient and her nurse by phone.     Marisol is reporting severe withdrawal - especially anxiety.  Feels like the ativan helps some but not enough.      Reasonable to take one dose of oral ativan 2 mg in addition to the prior IV doses.     She reported being able to feel the suboxone doses and feels like it is wearing off.    Add one time dose of suboxone 8 mg.     I will be by to see her in 2-3 hours.     Fitz Claire MD [facility]  [facility]  [phone]  Fax [phone]

## 2024-11-07 NOTE — PROGRESS NOTES
Cc: OUD    Assessment/Plan:    29 year old female with oud admitted with crohn's colitis hospitalization complicated by overdose on illicit fentanyl.  Has tolerated SL bup/naloxone and reports motivation to engage in outpatient addiction management which she has not done previously.  Continue 8 mg of suboxone three times daily and Rx for 7 days of suboxone sent to pharmacy.  Discussed benefits of suboxone maintenance therapy and encouraged her to consider continuing bup/naloxone for at least 6 months.  She does not live far from the [facility] addiction medicine location on [address] and agrees to call and schedule an appointment there.       I discussed higher level of care options such as residential addiction treatment which she refuses given responsibilities including work and care of her 6 year old son.        Fitz Claire MD [facility]  [facility] [place]  [phone]  Fax [phone]    Subjective:    Marisol took about 32 mg of SL buprenorphine/naloxone yesterday and has taken 16 mg so far today.  Feels like she is no longer in withdrawal other than severe anxiety.     She denies ever using drugs intravenously.  Has been using fentanyl every 4 hours or so for several years.  Did have several months of sobriety of bup/naloxone 8 mg once daily but never took more than that and was never in treatment through a practice - always got suboxone from other patients who had prescriptions.      Home medications:   Citalopram  Prn hydroxyzine  Progesterone IUD placed in 2022    SH: she works as a dancer in a gentleman's club.  She has a 6 year old son who lives with her.  His father who she does not live with is caring for him while she is hospitalized.  She finished high school and did some college but did not graduate.  She reports stable housing.        FH: multiple first degree relatives with substance use disorders.     Objective:     /64   Pulse (!) 39   Temp 100 °F (37.8 °C)

## 2024-11-07 NOTE — PROGRESS NOTES
[facility]  [address]  [place] [address] [phone]                     GI PROGRESS NOTE    Patient Name: Marisol Barros      : 1995      MRN: [id number]  Admit Date: 11/3/2024  Today's Date: 2024  CC: UC flair    Subjective:     Patient calm and cooperative during exam. She ordered door dash last night- tolerating regular diet.         Objective:     Blood pressure 125/64, pulse (!) 38, temperature 100 °F (37.8 °C), temperature source Oral, resp. rate 16, height 1.524 m (5'), weight 68.9 kg (151 lb 14.4 oz), SpO2 96%.    Physical Exam:  General appearance: cooperative, appears comfortable    Skin: Extremities and face reveal no rashes.   HEENT: Sclerae anicteric.   Cardiovascular: Regular rate and rhythm.   Respiratory: Comfortable breathing with no accessory muscle use.   GI: Abdomen nondistended, soft, and tender. Normal active bowel sounds.  Rectal: Deferred   Musculoskeletal: No pitting edema of the lower legs.   Psychiatric: no anxiety or agitation       Data Review:    No results found for this or any previous visit (from the past 24 hour(s)).        Assessment / Plan :     28 y/o female with Hx of UC, SALINA, and PCOS  presents with abdominal pain. Recently seen at [facility] due to dental pain/decay - she was given steroids, PNC, pain medicine and sent home.      UC previously treated with mesalamine and steroids. Compliance has been an issue.      CT showed moderate acute colitis involving left colon and rectum.     C-diff and Enteric panel (-)   Fecal Inderjit pending     Colonoscopy on hold until patient is medically stable.    - switched to oral steroids- Prednisone 40 mg.   - addiction medicine following   - supportive care per primary team   - Regular diet as tolerated     Unfortunately her ongoing drug use complicates her ability to be compliant and safely complete endoscopic evaluation. Ideally would complete evaluation for consideration of biologic treatment,  however this requires medical compliance. At this time we can offer steroid taper and mesalamine, though patient has refused further treatment with mesalamine. She will need close OP f/up.          Patient Active Hospital Problem List:   Principal Problem:    Colitis  Active Problems:    COVID-19 virus infection  Resolved Problems:    * No resolved hospital problems. *      Time Spent with Patient: 15    GIRISH Baron - NP

## 2024-11-07 NOTE — PROGRESS NOTES
Spiritual Health History and Assessment/Progress Note  [facility]    (P) Spiritual/Emotional Needs, Loneliness/Social Isolation,  ,  ,      Name: Marisol Barros MRN: [id number]    Age: 29 y.o.     Sex: female   Language: English   Pentecostal: None   Colitis     Date: 11/7/2024            Total Time Calculated: (P) 65 min              Spiritual Assessment continued in [facility] 5E1 SURGICAL UNIT        Referral/Consult From: (P) Rounding   Encounter Overview/Reason: (P) Spiritual/Emotional Needs, Loneliness/Social Isolation  Service Provided For: (P) Patient    Genesis, Belief, Meaning:   Patient has beliefs or practices that help with coping during difficult times  Family/Friends No family/friends present      Importance and Influence:  Patient has spiritual/personal beliefs that influence decisions regarding their health  Family/Friends No family/friends present    Community:  Patient expresses feelings of isolation: disconnected from family/friends  Family/Friends No family/friends present    Assessment and Plan of Care:     Patient Interventions include: Facilitated expression of thoughts and feelings, Explored spiritual coping/struggle/distress, and Affirmed coping skills/support systems  Family/Friends Interventions include: No family/friends present    Patient Plan of Care: Spiritual Care available upon further referral  Family/Friends Plan of Care: No family/friends present     present for music therapy encounter.  engaged in conversation about patients's struggles and plan of care. Patient is aware that spiritual care is available to her by asking a member of her care team or dialing [phone] from patient phone.     Electronically signed by Lennie Marreroin Resident on 11/7/2024 at 3:17 PM

## 2024-11-07 NOTE — PROGRESS NOTES
Music Therapy Assessment  [facility]    Marisol Barros [id number]     1995  29 y.o.  female    Patient Telephone Number: [phone] (home)   Zoroastrianism Affiliation: None   Language: English   Patient Active Problem List    Diagnosis Date Noted    COVID-19 virus infection     Colitis 2024    Iron deficiency anemia due to chronic blood loss 10/27/2023    Ulcerative colitis (HCC) 2023    Encounter for breast augmentation 10/25/2021     contractions 2017    Pregnancy 2017    Ulcerative pancolitis with rectal bleeding (HCC) 2017    Hyperemesis arising during pregnancy 2017    Previous  section 2017        Date: 2024            Total Time Calculated: 75 min          [facility] 5E1 SURGICAL UNIT    Mental Status:   [x] Alert [  ] Forgetful [  ]  Confused  [  ] Minimally responsive  [  ] Sleeping    Communication Status: [  ] Impaired Speech [  ] Nonverbal -N/A    Physical Status:   [  ] Oxygen in use  [  ] Hard of Hearing [  ] Vision Impaired  [x] Ambulatory  [  ] Ambulatory with assistance [  ] Non-ambulatory     Music Preferences, Background: Pt enjoys a variety of carter/songwriters; Artists like  Whit[place], Finesse Cali, Randy Styles, Phoebe Bridgers, etc.      Clinical Problem addressed: Emotional support; Support healthy coping; Support relaxation     Goal(s) met in session:  Physical/Pain management (Scale of 1-10):    Pre-session rating: Pt reported discomfort due to withdrawal   Post-session rating: Pt reported pain in her chest, but expressed feeling \"better\" as a result of music interventions   [x] Increased relaxation   [x] Affected breathing patterns  [  ] Decreased muscle tension   [  ] Decreased agitation  [  ] Affected heart rate    [  ] Increased alertness     Emotional/Psychological:  [x] Increased self-expression   [  ] Decreased aggressive behavior   [x] Decreased feelings of stress  [  ] Discussed healthy coping skills     [  ] Improved

## 2024-11-07 NOTE — DISCHARGE SUMMARY
11/5 ativan for withdrawal sx's in the interim. Discussed with nursing NOT to administer ativan and dilaudid together  -11/6  patient found off floor locked in ED restroom, narcan given x2  -addiction medicine consulted, suboxone started  -bedside sitter ordered  DISCHARGE DIAGNOSES / PLAN:       Crohn's colitis, 8 week steroid taper, follow up with GI  Polysubstance abuse, follow up with addiction medicine, continue suboxone 8mg three times daily         PENDING TEST RESULTS:   At the time of discharge the following test results are still pending: none    FOLLOW UP APPOINTMENTS:    Follow-up Information    None           ADDITIONAL CARE RECOMMENDATIONS: none    DIET: regular diet  Oral Nutritional Supplements: Ensure High Proonce a day    ACTIVITY: activity as tolerated    WOUND CARE: none    EQUIPMENT needed: none      DISCHARGE MEDICATIONS:     Medication List        START taking these medications      amoxicillin-clavulanate 875-125 MG per tablet  Commonly known as: AUGMENTIN  Take 1 tablet by mouth 2 times daily for 10 days            CONTINUE taking these medications      IUD'S IU            ASK your doctor about these medications      citalopram 10 MG tablet  Commonly known as: CELEXA     mesalamine 1.2 g EC tablet  Commonly known as: LIALDA     oxyCODONE 5 MG immediate release tablet  Commonly known as: Roxicodone  Take 1 tablet by mouth every 6 hours as needed for Pain for up to 3 days. Intended supply: 3 days. Take lowest dose possible to manage pain Max Daily Amount: 20 mg  Ask about: Should I take this medication?     predniSONE 10 MG (21) Tbpk  Take 1 each by mouth daily               Where to Get Your Medications        These medications were sent to [id number] - [place], [place] - [address] - P [phone] - F [phone] [address], [facility] [address]      Phone: [phone]   amoxicillin-clavulanate 875-125 MG per tablet  oxyCODONE 5 MG immediate  release tablet           NOTIFY YOUR PHYSICIAN FOR ANY OF THE FOLLOWING:   Fever over 101 degrees for 24 hours.   Chest pain, shortness of breath, fever, chills, nausea, vomiting, diarrhea, change in mentation, falling, weakness, bleeding. Severe pain or pain not relieved by medications.  Or, any other signs or symptoms that you may have questions about.    DISPOSITION:   x Home With:   OT  PT  HH  RN       Long term SNF/Inpatient Rehab    Independent/assisted living    Hospice    Other:       PATIENT CONDITION AT DISCHARGE:     Functional status    Poor     Deconditioned    x Independent      Cognition    x Lucid     Forgetful     Dementia      Catheters/lines (plus indication)    Ruth     PICC     PEG    x None      Code status    x Full code     DNR      PHYSICAL EXAMINATION AT DISCHARGE:    General : alert x 3, awake, no acute distress,   HEENT: PEERL, EOMI, moist mucus membrane  Neck: supple, no JVD, no meningeal signs  Chest: Clear to auscultation bilaterally   CVS: S1 S2 heard, Capillary refill less than 2 seconds  Abd: soft/ Non tender, non distended, BS physiological,   Ext: no clubbing, no cyanosis, no edema, brisk 2+ DP pulses  Neuro/Psych: pleasant mood and affect, CN 2-12 grossly intact, sensory grossly within normal limit,  Skin: warm     CHRONIC MEDICAL DIAGNOSES:  Principal Problem:    Colitis  Active Problems:    COVID-19 virus infection  Resolved Problems:    * No resolved hospital problems. *        Greater than 31 minutes were spent with the patient on counseling and coordination of care    Signed:   GIRISH Camacho CNP  11/7/2024  12:31 PM

## 2024-11-08 LAB
BACTERIA SPEC CULT: NORMAL
BACTERIA SPEC CULT: NORMAL
CALPROTECTIN STL-MCNT: 7880 UG/G (ref 0–120)
SERVICE CMNT-IMP: NORMAL
SERVICE CMNT-IMP: NORMAL

## 2024-11-13 NOTE — PROGRESS NOTES
Physician Progress Note      PATIENT:               SHAHEEN FRANCIS  CSN #:                  [id number]  :                       1995  ADMIT DATE:       11/3/2024 2:03 PM  DISCH DATE:        2024 4:45 PM  RESPONDING  PROVIDER #:        JONATHAN BERNARD          QUERY TEXT:    Pt admitted with Crohn's  flare.  Noted documentation of\"  severe withdrawal   this morning prior to overdosing on illicit fentanyl (presumably) and was   treated with narcan  on      by  addiction   consultant.  If possible,   please document in progress notes and discharge summary:    The medical record reflects the following:  Risk Factors: poly substance  abuse  Clinical Indicators:  Pt became harder to awake. Once aroused pt admitted to   taking something when she went to the ED, but not sure what it was. Nurse   leader administered 0.8 of narcan per MD request. A white powder substance and   black tube was found in pts robe pocket.  addiction  note   -  29 y.o.  female with opioid use disorder who reported   severe withdrawal this morning prior to overdosing on illicit fentanyl   (presumably) and was treated with narcan.  Treatment: Narcan;    sitter    Thank you  very  much  Options provided:  -- Possible fentanyl  overdose  confirmed not present on admission  -- fentanyl  overdose ruled out  -- Defer to addiction  consultant documentation regarding  fentanyl  overdose  -- Other - I will add my own diagnosis  -- Disagree - Not applicable / Not valid  -- Disagree - Clinically unable to determine / Unknown  -- Refer to Clinical Documentation Reviewer    PROVIDER RESPONSE TEXT:    I defer to addiction consultant regarding documentation of fentanyl overdose    Query created by: Henrietta Mcintosh on 2024 8:23 AM      Electronically signed by:  JONATHAN BERNARD 2024 7:05 AM

## (undated) DEVICE — GARMENT,MEDLINE,DVT,INT,CALF,MED, GEN2: Brand: MEDLINE

## (undated) DEVICE — SYSTEM IMPL DEL FOR BRST IMPL FUN (SEE COMMENT)

## (undated) DEVICE — GLOVE ORANGE PI 7 1/2   MSG9075

## (undated) DEVICE — TRAY PREP DRY W/ PREM GLV 2 APPL 6 SPNG 2 UNDPD 1 OVERWRAP

## (undated) DEVICE — 450 ML BOTTLE OF 0.05% CHLORHEXIDINE GLUCONATE IN 99.95% STERILE WATER FOR IRRIGATION, USP AND APPLICATOR.: Brand: IRRISEPT ANTIMICROBIAL WOUND LAVAGE

## (undated) DEVICE — SYR 10ML LUER LOK 1/5ML GRAD --

## (undated) DEVICE — SOLUTION IRRIG 1000ML 0.9% SOD CHL USP POUR PLAS BTL

## (undated) DEVICE — INTENDED FOR TISSUE SEPARATION, AND OTHER PROCEDURES THAT REQUIRE A SHARP SURGICAL BLADE TO PUNCTURE OR CUT.: Brand: BARD-PARKER ® CARBON RIB-BACK BLADES

## (undated) DEVICE — SPONGE GZ W4XL4IN COT 12 PLY TYP VII WVN C FLD DSGN

## (undated) DEVICE — NEEDLE HYPO 22GA L1.5IN BLK POLYPR HUB S STL REG BVL STR

## (undated) DEVICE — SUTURE VCRL SZ 3-0 L27IN ABSRB UD L24MM FS-1 3/8 CIR REV J442H

## (undated) DEVICE — STRIP,CLOSURE,WOUND,MEDI-STRIP,1/2X4: Brand: MEDLINE

## (undated) DEVICE — PLASTICS CHEST BREAST ASU: Brand: MEDLINE INDUSTRIES, INC.

## (undated) DEVICE — DRESSING,GAUZE,XEROFORM,CURAD,1"X8",ST: Brand: CURAD

## (undated) DEVICE — PENCIL SMK EVAC L10FT DIA95MM TBNG NONSTICK W ADPT TO 22MM

## (undated) DEVICE — MASTISOL ADHESIVE LIQ 2/3ML

## (undated) DEVICE — SUTURE MCRYL SZ 4-0 L27IN ABSRB UD L19MM PS-2 1/2 CIR PRIM Y426H

## (undated) DEVICE — Device